# Patient Record
Sex: FEMALE | Race: WHITE | Employment: UNEMPLOYED | URBAN - METROPOLITAN AREA
[De-identification: names, ages, dates, MRNs, and addresses within clinical notes are randomized per-mention and may not be internally consistent; named-entity substitution may affect disease eponyms.]

---

## 2019-09-18 ENCOUNTER — HOSPITAL ENCOUNTER (EMERGENCY)
Age: 44
Discharge: HOME | End: 2019-09-18
Attending: EMERGENCY MEDICINE
Payer: COMMERCIAL

## 2019-09-18 VITALS
WEIGHT: 293 LBS | OXYGEN SATURATION: 100 % | RESPIRATION RATE: 20 BRPM | HEART RATE: 78 BPM | BODY MASS INDEX: 48.82 KG/M2 | HEIGHT: 65 IN | TEMPERATURE: 98.4 F | DIASTOLIC BLOOD PRESSURE: 69 MMHG | SYSTOLIC BLOOD PRESSURE: 157 MMHG

## 2019-09-18 DIAGNOSIS — L60.0 INGROWN TOENAIL OF RIGHT FOOT WITH INFECTION: Primary | ICD-10-CM

## 2019-09-18 PROCEDURE — 99283 EMERGENCY DEPT VISIT LOW MDM: CPT

## 2019-09-18 RX ORDER — CLINDAMYCIN HYDROCHLORIDE 300 MG/1
300 CAPSULE ORAL 3 TIMES DAILY
Qty: 30 CAPSULE | Refills: 0 | Status: SHIPPED | OUTPATIENT
Start: 2019-09-18 | End: 2019-09-28

## 2019-09-18 ASSESSMENT — ENCOUNTER SYMPTOMS
TROUBLE SWALLOWING: 0
VOMITING: 0
BACK PAIN: 0
BLOOD IN STOOL: 0
SHORTNESS OF BREATH: 0
VOICE CHANGE: 0
EYE DISCHARGE: 0
EYE REDNESS: 0
WHEEZING: 0
DIARRHEA: 0
FACIAL SWELLING: 0
COUGH: 0
SORE THROAT: 0
STRIDOR: 0
EYE PAIN: 0
ABDOMINAL PAIN: 0
SINUS PRESSURE: 0
CONSTIPATION: 0
CHEST TIGHTNESS: 0
CHOKING: 0

## 2019-09-18 ASSESSMENT — PAIN DESCRIPTION - DESCRIPTORS: DESCRIPTORS: DULL;THROBBING;STABBING

## 2019-09-18 ASSESSMENT — PAIN DESCRIPTION - PROGRESSION: CLINICAL_PROGRESSION: NOT CHANGED

## 2019-09-18 ASSESSMENT — PAIN DESCRIPTION - ONSET: ONSET: ON-GOING

## 2019-09-18 ASSESSMENT — PAIN SCALES - GENERAL: PAINLEVEL_OUTOF10: 9

## 2019-09-18 ASSESSMENT — PAIN DESCRIPTION - ORIENTATION: ORIENTATION: RIGHT

## 2019-09-18 ASSESSMENT — PAIN DESCRIPTION - PAIN TYPE: TYPE: ACUTE PAIN

## 2019-09-18 ASSESSMENT — PAIN DESCRIPTION - LOCATION: LOCATION: TOE (COMMENT WHICH ONE)

## 2019-09-18 ASSESSMENT — PAIN DESCRIPTION - FREQUENCY: FREQUENCY: CONTINUOUS

## 2019-09-18 NOTE — ED TRIAGE NOTES
Patient complains of right great toe pain x2 weeks, she had an I&D done by PCP. She has also been seen at urgent care and finished her antibiotics.  She has not yet been to a podiatrist.

## 2019-09-18 NOTE — ED PROVIDER NOTES
/ [facility] ED  eMERGENCY dEPARTMENT eNCOUnter      Pt Name: Gen Tong  MRN: [id number]  [place] 1975  Date of evaluation: 9/18/2019  Provider: Julia Rubalcava MD    [address] [place] [address]       Chief Complaint   Patient presents with    Toe Pain     x 2 week (Rt big toe infection)         HISTORY OF PRESENT ILLNESS   (Location/Symptom, Timing/Onset,Context/Setting, Quality, Duration, Modifying Factors, Severity)  Note limiting factors. Gen Tong is a 40 y.o. female who presents to the emergency department patient comes to the emergency with right big toe infection building up for the last 2 weeks time patient does not recall any injury or fall no prior history of ingrown toenailof, no history of diabetes mellitus patient has moved to this town from [facility] [place] where she was treated ×2 with the 2 different antibiotics which did not help patient taking ibuprofen for her swelling and pain    HPI    NursingNotes were reviewed. REVIEW OF SYSTEMS    (2-9 systems for level 4, 10 or more for level 5)     Review of Systems   Constitutional: Negative. Negative for activity change and fever. HENT: Negative for congestion, drooling, facial swelling, mouth sores, nosebleeds, sinus pressure, sore throat, trouble swallowing and voice change. Eyes: Negative for pain, discharge, redness and visual disturbance. Respiratory: Negative for cough, choking, chest tightness, shortness of breath, wheezing and stridor. Cardiovascular: Negative for chest pain, palpitations and leg swelling. Gastrointestinal: Negative for abdominal pain, blood in stool, constipation, diarrhea and vomiting. Endocrine: Negative for cold intolerance, polyphagia and polyuria. Genitourinary: Negative for dysuria, flank pain, frequency, genital sores and urgency. Musculoskeletal: Positive for arthralgias, joint swelling and myalgias. Negative for back pain, neck pain and neck stiffness. Skin: Negative for pallor and rash. abused: None     Forced sexual activity: None   Other Topics Concern    None   Social History Narrative    None       SCREENINGS      @FLOW([id number])@      PHYSICAL EXAM    (up to 7 for level 4, 8 or more for level 5)     ED Triage Vitals [09/18/19 1046]   BP Temp Temp Source Pulse Resp SpO2 Height Weight   (!) 157/69 98.4 °F (36.9 °C) Oral 78 20 100 % 5' 5\" (1.651 m) (!) 384 lb (174.2 kg)       Physical Exam   Constitutional: She is oriented to person, place, and time. She appears well-developed and well-nourished. Active alert cooperative patient in nontoxic appearance ambulatory   HENT:   Head: Normocephalic and atraumatic. Right Ear: External ear normal.   Left Ear: External ear normal.   Nose: Nose normal.   Mouth/Throat: Oropharynx is clear and moist.   Eyes: Pupils are equal, round, and reactive to light. EOM are normal.   Neck: Normal range of motion. Neck supple. Cardiovascular: Normal rate, regular rhythm, normal heart sounds and intact distal pulses. Exam reveals no gallop. No murmur heard. Pulmonary/Chest: Breath sounds normal. No respiratory distress. She has no wheezes. Abdominal: Soft. Bowel sounds are normal. She exhibits no mass. There is no rebound. Musculoskeletal: Normal range of motion. She exhibits edema and tenderness. She exhibits no deformity. Attention  to the right foot and compared with the left foot patient has no ingrown toenail to the left big toe but on the right side patient has a positive for ingrown toenails positive for surrounding erythema and swelling of fluctuation to suspect any abscess to drain at this time patient is findings consistent with infected ingrown toenail   Neurological: She is alert and oriented to person, place, and time. No cranial nerve deficit. She exhibits normal muscle tone. Skin: Skin is warm. No rash noted. No erythema. Psychiatric: Her behavior is normal. Thought content normal.   Nursing note and vitals reviewed.       DIAGNOSTIC

## 2019-10-08 ENCOUNTER — HOSPITAL ENCOUNTER (EMERGENCY)
Age: 44
Discharge: HOME | End: 2019-10-08
Attending: EMERGENCY MEDICINE
Payer: COMMERCIAL

## 2019-10-08 ENCOUNTER — APPOINTMENT (OUTPATIENT)
Dept: GENERAL RADIOLOGY | Age: 44
End: 2019-10-08
Payer: COMMERCIAL

## 2019-10-08 VITALS
SYSTOLIC BLOOD PRESSURE: 151 MMHG | TEMPERATURE: 97.8 F | DIASTOLIC BLOOD PRESSURE: 76 MMHG | OXYGEN SATURATION: 98 % | BODY MASS INDEX: 48.82 KG/M2 | HEIGHT: 65 IN | WEIGHT: 293 LBS | HEART RATE: 76 BPM | RESPIRATION RATE: 20 BRPM

## 2019-10-08 DIAGNOSIS — M54.12 CERVICAL RADICULOPATHY: Primary | ICD-10-CM

## 2019-10-08 DIAGNOSIS — M47.22 OSTEOARTHRITIS OF SPINE WITH RADICULOPATHY, CERVICAL REGION: ICD-10-CM

## 2019-10-08 PROCEDURE — 99283 EMERGENCY DEPT VISIT LOW MDM: CPT

## 2019-10-08 PROCEDURE — 6370000000 HC RX 637 (ALT 250 FOR IP): Performed by: EMERGENCY MEDICINE

## 2019-10-08 PROCEDURE — 72050 X-RAY EXAM NECK SPINE 4/5VWS: CPT

## 2019-10-08 PROCEDURE — 6360000002 HC RX W HCPCS: Performed by: EMERGENCY MEDICINE

## 2019-10-08 PROCEDURE — 96372 THER/PROPH/DIAG INJ SC/IM: CPT

## 2019-10-08 RX ORDER — LEVOTHYROXINE SODIUM 0.07 MG/1
75 TABLET ORAL
COMMUNITY
Start: 2019-08-30 | End: 2019-12-10 | Stop reason: SDUPTHER

## 2019-10-08 RX ORDER — ALBUTEROL SULFATE 2.5 MG/3ML
2.5 SOLUTION RESPIRATORY (INHALATION)
COMMUNITY
Start: 2019-08-02 | End: 2020-03-09 | Stop reason: SDUPTHER

## 2019-10-08 RX ORDER — PREDNISONE 10 MG/1
60 TABLET ORAL DAILY
Qty: 30 TABLET | Refills: 0 | Status: SHIPPED | OUTPATIENT
Start: 2019-10-08 | End: 2019-10-13

## 2019-10-08 RX ORDER — LORAZEPAM 0.5 MG/1
0.5 TABLET ORAL
COMMUNITY
Start: 2019-08-30 | End: 2019-10-29

## 2019-10-08 RX ORDER — BUTALBITAL, ACETAMINOPHEN AND CAFFEINE 50; 325; 40 MG/1; MG/1; MG/1
2 TABLET ORAL EVERY 4 HOURS PRN
COMMUNITY
Start: 2019-08-30 | End: 2020-06-22

## 2019-10-08 RX ORDER — ONDANSETRON 4 MG/1
4 TABLET, ORALLY DISINTEGRATING ORAL ONCE
Status: COMPLETED | OUTPATIENT
Start: 2019-10-08 | End: 2019-10-08

## 2019-10-08 RX ORDER — MONTELUKAST SODIUM 10 MG/1
10 TABLET ORAL
COMMUNITY
Start: 2019-08-30 | End: 2020-06-01 | Stop reason: SDUPTHER

## 2019-10-08 RX ORDER — KETOROLAC TROMETHAMINE 10 MG/1
10 TABLET, FILM COATED ORAL EVERY 6 HOURS PRN
Qty: 20 TABLET | Refills: 0 | Status: SHIPPED | OUTPATIENT
Start: 2019-10-08 | End: 2019-12-10 | Stop reason: ALTCHOICE

## 2019-10-08 RX ORDER — OXYCODONE HYDROCHLORIDE AND ACETAMINOPHEN 5; 325 MG/1; MG/1
1 TABLET ORAL EVERY 6 HOURS PRN
Qty: 10 TABLET | Refills: 0 | Status: SHIPPED | OUTPATIENT
Start: 2019-10-08 | End: 2019-10-11

## 2019-10-08 RX ORDER — IBUPROFEN 800 MG/1
800 TABLET ORAL
COMMUNITY
Start: 2019-08-30 | End: 2020-03-09 | Stop reason: ALTCHOICE

## 2019-10-08 RX ORDER — CYCLOBENZAPRINE HCL 10 MG
10 TABLET ORAL 3 TIMES DAILY PRN
Qty: 30 TABLET | Refills: 0 | Status: SHIPPED | OUTPATIENT
Start: 2019-10-08 | End: 2019-10-18

## 2019-10-08 RX ORDER — CETIRIZINE HYDROCHLORIDE 10 MG/1
10 TABLET ORAL
COMMUNITY
Start: 2019-08-30 | End: 2020-06-01 | Stop reason: SDUPTHER

## 2019-10-08 RX ADMIN — HYDROMORPHONE HYDROCHLORIDE 1 MG: 1 INJECTION, SOLUTION INTRAMUSCULAR; INTRAVENOUS; SUBCUTANEOUS at 13:50

## 2019-10-08 RX ADMIN — ONDANSETRON 4 MG: 4 TABLET, ORALLY DISINTEGRATING ORAL at 13:50

## 2019-10-08 ASSESSMENT — ENCOUNTER SYMPTOMS
APNEA: 0
SHORTNESS OF BREATH: 0
EYE PAIN: 0
BACK PAIN: 0
CONSTIPATION: 0
WHEEZING: 0
SINUS PRESSURE: 0
PHOTOPHOBIA: 0
VOMITING: 0
ABDOMINAL DISTENTION: 0
NAUSEA: 0
SORE THROAT: 0
COUGH: 0
ABDOMINAL PAIN: 0
DIARRHEA: 0
COLOR CHANGE: 0
RHINORRHEA: 0

## 2019-10-08 ASSESSMENT — PAIN DESCRIPTION - PAIN TYPE: TYPE: ACUTE PAIN

## 2019-10-08 ASSESSMENT — PAIN DESCRIPTION - LOCATION: LOCATION: NECK;HEAD

## 2019-10-08 ASSESSMENT — PAIN SCALES - GENERAL: PAINLEVEL_OUTOF10: 10

## 2019-10-08 ASSESSMENT — PAIN DESCRIPTION - DESCRIPTORS: DESCRIPTORS: THROBBING;STABBING;BURNING

## 2019-10-08 ASSESSMENT — PAIN DESCRIPTION - FREQUENCY: FREQUENCY: CONTINUOUS

## 2019-10-10 ENCOUNTER — OFFICE VISIT (OUTPATIENT)
Dept: FAMILY MEDICINE CLINIC | Age: 44
End: 2019-10-10
Payer: COMMERCIAL

## 2019-10-10 VITALS
TEMPERATURE: 97 F | WEIGHT: 293 LBS | SYSTOLIC BLOOD PRESSURE: 138 MMHG | HEIGHT: 65 IN | DIASTOLIC BLOOD PRESSURE: 82 MMHG | HEART RATE: 88 BPM | RESPIRATION RATE: 16 BRPM | BODY MASS INDEX: 48.82 KG/M2 | OXYGEN SATURATION: 98 %

## 2019-10-10 DIAGNOSIS — F32.A ANXIETY AND DEPRESSION: ICD-10-CM

## 2019-10-10 DIAGNOSIS — M54.41 CHRONIC BILATERAL LOW BACK PAIN WITH BILATERAL SCIATICA: ICD-10-CM

## 2019-10-10 DIAGNOSIS — E66.01 MORBID OBESITY DUE TO EXCESS CALORIES (HCC): ICD-10-CM

## 2019-10-10 DIAGNOSIS — M51.36 DDD (DEGENERATIVE DISC DISEASE), LUMBAR: ICD-10-CM

## 2019-10-10 DIAGNOSIS — J45.20 MILD INTERMITTENT ASTHMA WITHOUT COMPLICATION: ICD-10-CM

## 2019-10-10 DIAGNOSIS — M15.9 PRIMARY OSTEOARTHRITIS INVOLVING MULTIPLE JOINTS: ICD-10-CM

## 2019-10-10 DIAGNOSIS — Z12.4 SCREENING FOR CERVICAL CANCER: ICD-10-CM

## 2019-10-10 DIAGNOSIS — Z13.1 SCREENING FOR DIABETES MELLITUS: ICD-10-CM

## 2019-10-10 DIAGNOSIS — E03.9 HYPOTHYROIDISM, UNSPECIFIED TYPE: ICD-10-CM

## 2019-10-10 DIAGNOSIS — G89.29 CHRONIC BILATERAL LOW BACK PAIN WITH BILATERAL SCIATICA: ICD-10-CM

## 2019-10-10 DIAGNOSIS — M50.30 DDD (DEGENERATIVE DISC DISEASE), CERVICAL: Primary | ICD-10-CM

## 2019-10-10 DIAGNOSIS — M54.42 CHRONIC BILATERAL LOW BACK PAIN WITH BILATERAL SCIATICA: ICD-10-CM

## 2019-10-10 DIAGNOSIS — J30.89 PERENNIAL ALLERGIC RHINITIS: ICD-10-CM

## 2019-10-10 DIAGNOSIS — F19.11 HISTORY OF SUBSTANCE ABUSE (HCC): Chronic | ICD-10-CM

## 2019-10-10 DIAGNOSIS — F41.9 ANXIETY AND DEPRESSION: ICD-10-CM

## 2019-10-10 DIAGNOSIS — D50.9 IRON DEFICIENCY ANEMIA, UNSPECIFIED IRON DEFICIENCY ANEMIA TYPE: ICD-10-CM

## 2019-10-10 DIAGNOSIS — Z80.3 FAMILY HISTORY OF BREAST CANCER: ICD-10-CM

## 2019-10-10 DIAGNOSIS — Z12.39 SCREENING FOR BREAST CANCER: ICD-10-CM

## 2019-10-10 DIAGNOSIS — G43.009 MIGRAINE WITHOUT AURA AND WITHOUT STATUS MIGRAINOSUS, NOT INTRACTABLE: ICD-10-CM

## 2019-10-10 PROBLEM — M15.0 PRIMARY OSTEOARTHRITIS INVOLVING MULTIPLE JOINTS: Status: ACTIVE | Noted: 2019-10-10

## 2019-10-10 PROCEDURE — G8417 CALC BMI ABV UP PARAM F/U: HCPCS | Performed by: FAMILY MEDICINE

## 2019-10-10 PROCEDURE — 99205 OFFICE O/P NEW HI 60 MIN: CPT | Performed by: FAMILY MEDICINE

## 2019-10-10 PROCEDURE — 96160 PT-FOCUSED HLTH RISK ASSMT: CPT | Performed by: FAMILY MEDICINE

## 2019-10-10 PROCEDURE — G8484 FLU IMMUNIZE NO ADMIN: HCPCS | Performed by: FAMILY MEDICINE

## 2019-10-10 PROCEDURE — 1036F TOBACCO NON-USER: CPT | Performed by: FAMILY MEDICINE

## 2019-10-10 PROCEDURE — G8427 DOCREV CUR MEDS BY ELIG CLIN: HCPCS | Performed by: FAMILY MEDICINE

## 2019-10-10 RX ORDER — OXCARBAZEPINE 300 MG/1
TABLET, FILM COATED ORAL
COMMUNITY
Start: 2019-08-30 | End: 2020-04-20 | Stop reason: DRUGHIGH

## 2019-10-10 RX ORDER — ZOLPIDEM TARTRATE 10 MG/1
TABLET ORAL
COMMUNITY
Start: 2019-09-01 | End: 2020-01-21 | Stop reason: ALTCHOICE

## 2019-10-10 RX ORDER — HYDROXYZINE PAMOATE 50 MG/1
150 CAPSULE ORAL
COMMUNITY
Start: 2019-09-04 | End: 2019-10-10 | Stop reason: SDUPTHER

## 2019-10-10 RX ORDER — HYDROXYZINE PAMOATE 50 MG/1
50 CAPSULE ORAL 3 TIMES DAILY PRN
Qty: 90 CAPSULE | Refills: 0 | Status: SHIPPED | OUTPATIENT
Start: 2019-10-10 | End: 2019-11-09

## 2019-10-10 RX ORDER — ALBUTEROL SULFATE 90 UG/1
2 AEROSOL, METERED RESPIRATORY (INHALATION) EVERY 6 HOURS PRN
COMMUNITY
End: 2020-01-21 | Stop reason: SDUPTHER

## 2019-10-10 RX ORDER — MELOXICAM 15 MG/1
15 TABLET ORAL
COMMUNITY
Start: 2019-08-30 | End: 2020-03-09 | Stop reason: ALTCHOICE

## 2019-10-10 RX ORDER — ERGOCALCIFEROL 1.25 MG/1
50000 CAPSULE ORAL
COMMUNITY
Start: 2019-08-05 | End: 2019-12-10 | Stop reason: SDUPTHER

## 2019-10-10 RX ORDER — SERTRALINE HYDROCHLORIDE 100 MG/1
100 TABLET, FILM COATED ORAL
COMMUNITY
Start: 2019-08-30 | End: 2020-06-22 | Stop reason: DRUGHIGH

## 2019-10-10 SDOH — HEALTH STABILITY: MENTAL HEALTH: HOW MANY STANDARD DRINKS CONTAINING ALCOHOL DO YOU HAVE ON A TYPICAL DAY?: 1 OR 2

## 2019-10-10 SDOH — HEALTH STABILITY: MENTAL HEALTH: HOW OFTEN DO YOU HAVE A DRINK CONTAINING ALCOHOL?: MONTHLY OR LESS

## 2019-10-10 ASSESSMENT — ENCOUNTER SYMPTOMS
COUGH: 0
BLOOD IN STOOL: 0
WHEEZING: 0
VOMITING: 0
CHEST TIGHTNESS: 0
DIARRHEA: 0
SHORTNESS OF BREATH: 0
BACK PAIN: 1
ABDOMINAL PAIN: 0
NAUSEA: 0
CONSTIPATION: 0
ANAL BLEEDING: 0

## 2019-10-10 ASSESSMENT — COLUMBIA-SUICIDE SEVERITY RATING SCALE - C-SSRS
5. HAVE YOU STARTED TO WORK OUT OR WORKED OUT THE DETAILS OF HOW TO KILL YOURSELF? DO YOU INTEND TO CARRY OUT THIS PLAN?: NO
7. DID THIS OCCUR IN THE LAST THREE MONTHS: OVER A YEAR AGO?
1. WITHIN THE PAST MONTH, HAVE YOU WISHED YOU WERE DEAD OR WISHED YOU COULD GO TO SLEEP AND NOT WAKE UP?: NO
4. HAVE YOU HAD THESE THOUGHTS AND HAD SOME INTENTION OF ACTING ON THEM?: NO
6. HAVE YOU EVER DONE ANYTHING, STARTED TO DO ANYTHING, OR PREPARED TO DO ANYTHING TO END YOUR LIFE?: YES
3. HAVE YOU BEEN THINKING ABOUT HOW YOU MIGHT KILL YOURSELF?: NO
2. HAVE YOU ACTUALLY HAD ANY THOUGHTS OF KILLING YOURSELF?: YES

## 2019-10-10 ASSESSMENT — PATIENT HEALTH QUESTIONNAIRE - PHQ9
SUM OF ALL RESPONSES TO PHQ QUESTIONS 1-9: 24
6. FEELING BAD ABOUT YOURSELF - OR THAT YOU ARE A FAILURE OR HAVE LET YOURSELF OR YOUR FAMILY DOWN: 3
2. FEELING DOWN, DEPRESSED OR HOPELESS: 3
SUM OF ALL RESPONSES TO PHQ9 QUESTIONS 1 & 2: 6
9. THOUGHTS THAT YOU WOULD BE BETTER OFF DEAD, OR OF HURTING YOURSELF: 0
10. IF YOU CHECKED OFF ANY PROBLEMS, HOW DIFFICULT HAVE THESE PROBLEMS MADE IT FOR YOU TO DO YOUR WORK, TAKE CARE OF THINGS AT HOME, OR GET ALONG WITH OTHER PEOPLE: 3
4. FEELING TIRED OR HAVING LITTLE ENERGY: 3
1. LITTLE INTEREST OR PLEASURE IN DOING THINGS: 3
SUM OF ALL RESPONSES TO PHQ QUESTIONS 1-9: 24
3. TROUBLE FALLING OR STAYING ASLEEP: 3
8. MOVING OR SPEAKING SO SLOWLY THAT OTHER PEOPLE COULD HAVE NOTICED. OR THE OPPOSITE, BEING SO FIGETY OR RESTLESS THAT YOU HAVE BEEN MOVING AROUND A LOT MORE THAN USUAL: 3
5. POOR APPETITE OR OVEREATING: 3
7. TROUBLE CONCENTRATING ON THINGS, SUCH AS READING THE NEWSPAPER OR WATCHING TELEVISION: 3

## 2019-10-15 ENCOUNTER — TELEPHONE (OUTPATIENT)
Dept: FAMILY MEDICINE CLINIC | Age: 44
End: 2019-10-15

## 2019-10-15 DIAGNOSIS — M54.42 CHRONIC BILATERAL LOW BACK PAIN WITH BILATERAL SCIATICA: Primary | ICD-10-CM

## 2019-10-15 DIAGNOSIS — M50.30 DDD (DEGENERATIVE DISC DISEASE), CERVICAL: ICD-10-CM

## 2019-10-15 DIAGNOSIS — M54.41 CHRONIC BILATERAL LOW BACK PAIN WITH BILATERAL SCIATICA: Primary | ICD-10-CM

## 2019-10-15 DIAGNOSIS — G89.29 CHRONIC BILATERAL LOW BACK PAIN WITH BILATERAL SCIATICA: Primary | ICD-10-CM

## 2019-10-15 DIAGNOSIS — M51.36 DDD (DEGENERATIVE DISC DISEASE), LUMBAR: ICD-10-CM

## 2019-11-05 ENCOUNTER — E-VISIT (OUTPATIENT)
Dept: FAMILY MEDICINE CLINIC | Age: 44
End: 2019-11-05
Payer: COMMERCIAL

## 2019-11-05 PROCEDURE — 98969 PR NONPHYSICIAN ONLINE ASSESSMENT AND MANAGEMENT: CPT | Performed by: NURSE PRACTITIONER

## 2019-11-05 ASSESSMENT — LIFESTYLE VARIABLES
SMOKING_STATUS: NO, I'M A FORMER SMOKER
PACKS_PER_DAY: 3
SMOKING_YEARS: 26

## 2019-12-10 ENCOUNTER — OFFICE VISIT (OUTPATIENT)
Dept: FAMILY MEDICINE CLINIC | Age: 44
End: 2019-12-10
Payer: COMMERCIAL

## 2019-12-10 VITALS
OXYGEN SATURATION: 97 % | BODY MASS INDEX: 48.82 KG/M2 | SYSTOLIC BLOOD PRESSURE: 130 MMHG | TEMPERATURE: 97.2 F | WEIGHT: 293 LBS | HEART RATE: 114 BPM | HEIGHT: 65 IN | DIASTOLIC BLOOD PRESSURE: 80 MMHG

## 2019-12-10 DIAGNOSIS — M15.9 PRIMARY OSTEOARTHRITIS INVOLVING MULTIPLE JOINTS: ICD-10-CM

## 2019-12-10 DIAGNOSIS — E03.9 HYPOTHYROIDISM, UNSPECIFIED TYPE: ICD-10-CM

## 2019-12-10 DIAGNOSIS — M54.42 CHRONIC BILATERAL LOW BACK PAIN WITH BILATERAL SCIATICA: ICD-10-CM

## 2019-12-10 DIAGNOSIS — G89.29 CHRONIC BILATERAL LOW BACK PAIN WITH BILATERAL SCIATICA: ICD-10-CM

## 2019-12-10 DIAGNOSIS — B37.2 CANDIDAL INTERTRIGO: ICD-10-CM

## 2019-12-10 DIAGNOSIS — F41.9 ANXIETY AND DEPRESSION: ICD-10-CM

## 2019-12-10 DIAGNOSIS — J45.40 MODERATE PERSISTENT ASTHMA WITHOUT COMPLICATION: Primary | ICD-10-CM

## 2019-12-10 DIAGNOSIS — Z13.220 SCREENING CHOLESTEROL LEVEL: ICD-10-CM

## 2019-12-10 DIAGNOSIS — G89.29 CHRONIC NECK PAIN: ICD-10-CM

## 2019-12-10 DIAGNOSIS — D50.9 IRON DEFICIENCY ANEMIA, UNSPECIFIED IRON DEFICIENCY ANEMIA TYPE: ICD-10-CM

## 2019-12-10 DIAGNOSIS — M54.41 CHRONIC BILATERAL LOW BACK PAIN WITH BILATERAL SCIATICA: ICD-10-CM

## 2019-12-10 DIAGNOSIS — M54.2 CHRONIC NECK PAIN: ICD-10-CM

## 2019-12-10 DIAGNOSIS — Z23 NEED FOR VACCINATION: ICD-10-CM

## 2019-12-10 DIAGNOSIS — E55.9 VITAMIN D DEFICIENCY: ICD-10-CM

## 2019-12-10 DIAGNOSIS — F32.A ANXIETY AND DEPRESSION: ICD-10-CM

## 2019-12-10 PROCEDURE — G8482 FLU IMMUNIZE ORDER/ADMIN: HCPCS | Performed by: FAMILY MEDICINE

## 2019-12-10 PROCEDURE — G8427 DOCREV CUR MEDS BY ELIG CLIN: HCPCS | Performed by: FAMILY MEDICINE

## 2019-12-10 PROCEDURE — 99214 OFFICE O/P EST MOD 30 MIN: CPT | Performed by: FAMILY MEDICINE

## 2019-12-10 PROCEDURE — G8417 CALC BMI ABV UP PARAM F/U: HCPCS | Performed by: FAMILY MEDICINE

## 2019-12-10 PROCEDURE — 90471 IMMUNIZATION ADMIN: CPT | Performed by: FAMILY MEDICINE

## 2019-12-10 PROCEDURE — 90686 IIV4 VACC NO PRSV 0.5 ML IM: CPT | Performed by: FAMILY MEDICINE

## 2019-12-10 PROCEDURE — 1036F TOBACCO NON-USER: CPT | Performed by: FAMILY MEDICINE

## 2019-12-10 RX ORDER — NYSTATIN 100000 U/G
CREAM TOPICAL
Qty: 30 G | Refills: 3 | Status: SHIPPED | OUTPATIENT
Start: 2019-12-10 | End: 2020-04-27 | Stop reason: ALTCHOICE

## 2019-12-10 RX ORDER — KETOROLAC TROMETHAMINE 10 MG/1
10 TABLET, FILM COATED ORAL EVERY 6 HOURS PRN
Qty: 20 TABLET | Refills: 0 | Status: CANCELLED | OUTPATIENT
Start: 2019-12-10

## 2019-12-10 RX ORDER — OXCARBAZEPINE 300 MG/1
TABLET, FILM COATED ORAL
Qty: 60 TABLET | Refills: 5 | Status: CANCELLED | OUTPATIENT
Start: 2019-12-10

## 2019-12-10 RX ORDER — FLUTICASONE PROPIONATE 110 UG/1
2 AEROSOL, METERED RESPIRATORY (INHALATION) 2 TIMES DAILY
Qty: 1 INHALER | Refills: 3 | Status: SHIPPED | OUTPATIENT
Start: 2019-12-10 | End: 2020-03-30

## 2019-12-10 RX ORDER — ERGOCALCIFEROL 1.25 MG/1
50000 CAPSULE ORAL WEEKLY
Qty: 4 CAPSULE | Refills: 0 | Status: SHIPPED | OUTPATIENT
Start: 2019-12-10 | End: 2019-12-17 | Stop reason: SDUPTHER

## 2019-12-10 RX ORDER — CYCLOBENZAPRINE HCL 10 MG
10 TABLET ORAL 3 TIMES DAILY PRN
COMMUNITY
End: 2020-04-27 | Stop reason: ALTCHOICE

## 2019-12-10 RX ORDER — BUTALBITAL, ACETAMINOPHEN AND CAFFEINE 50; 325; 40 MG/1; MG/1; MG/1
2 TABLET ORAL EVERY 4 HOURS PRN
Qty: 180 TABLET | Refills: 2 | Status: CANCELLED | OUTPATIENT
Start: 2019-12-10 | End: 2020-03-21

## 2019-12-10 RX ORDER — LORAZEPAM 0.5 MG/1
TABLET ORAL
COMMUNITY
Start: 2019-09-01

## 2019-12-10 RX ORDER — LEVOTHYROXINE SODIUM 0.07 MG/1
75 TABLET ORAL DAILY
Qty: 30 TABLET | Refills: 5 | Status: SHIPPED | OUTPATIENT
Start: 2019-12-10 | End: 2020-05-25

## 2019-12-10 RX ORDER — IBUPROFEN 800 MG/1
800 TABLET ORAL EVERY 6 HOURS PRN
Qty: 120 TABLET | Refills: 5 | Status: CANCELLED | OUTPATIENT
Start: 2019-12-10 | End: 2020-03-21

## 2019-12-10 RX ORDER — NYSTATIN 100000 [USP'U]/G
POWDER TOPICAL
Qty: 60 G | Refills: 3 | Status: SHIPPED | OUTPATIENT
Start: 2019-12-10 | End: 2020-01-21 | Stop reason: SDUPTHER

## 2019-12-10 RX ORDER — HYDROXYZINE 50 MG/1
150 TABLET, FILM COATED ORAL 4 TIMES DAILY
COMMUNITY
End: 2020-03-09 | Stop reason: SDUPTHER

## 2019-12-10 RX ORDER — CYCLOBENZAPRINE HCL 10 MG
10 TABLET ORAL 3 TIMES DAILY PRN
Qty: 30 TABLET | Refills: 2 | Status: CANCELLED | OUTPATIENT
Start: 2019-12-10

## 2019-12-10 ASSESSMENT — ENCOUNTER SYMPTOMS
NAUSEA: 0
CHEST TIGHTNESS: 1
TROUBLE SWALLOWING: 0
COUGH: 0
VOMITING: 0
SHORTNESS OF BREATH: 1
SORE THROAT: 0
SINUS PRESSURE: 0
DIARRHEA: 0
ANAL BLEEDING: 0
CONSTIPATION: 0
BLOOD IN STOOL: 0
ABDOMINAL PAIN: 0
SINUS PAIN: 0
BACK PAIN: 1
RHINORRHEA: 0
WHEEZING: 1

## 2019-12-11 ENCOUNTER — TELEPHONE (OUTPATIENT)
Dept: ADMINISTRATIVE | Age: 44
End: 2019-12-11

## 2019-12-12 ENCOUNTER — HOSPITAL ENCOUNTER (OUTPATIENT)
Dept: LAB | Age: 44
Discharge: HOME | End: 2019-12-12
Payer: COMMERCIAL

## 2019-12-12 DIAGNOSIS — F32.A ANXIETY AND DEPRESSION: ICD-10-CM

## 2019-12-12 DIAGNOSIS — D50.9 IRON DEFICIENCY ANEMIA, UNSPECIFIED IRON DEFICIENCY ANEMIA TYPE: ICD-10-CM

## 2019-12-12 DIAGNOSIS — E03.9 HYPOTHYROIDISM, UNSPECIFIED TYPE: ICD-10-CM

## 2019-12-12 DIAGNOSIS — F41.9 ANXIETY AND DEPRESSION: ICD-10-CM

## 2019-12-12 DIAGNOSIS — Z13.220 SCREENING CHOLESTEROL LEVEL: ICD-10-CM

## 2019-12-12 LAB
ALBUMIN SERPL-MCNC: 3.9 G/DL (ref 3.5–4.6)
ALP BLD-CCNC: 89 U/L (ref 40–130)
ALT SERPL-CCNC: 18 U/L (ref 0–33)
ANION GAP SERPL CALCULATED.3IONS-SCNC: 11 MEQ/L (ref 9–15)
AST SERPL-CCNC: 18 U/L (ref 0–35)
BASOPHILS ABSOLUTE: 0.1 K/UL (ref 0–0.2)
BASOPHILS RELATIVE PERCENT: 0.8 %
BILIRUB SERPL-MCNC: 0.4 MG/DL (ref 0.2–0.7)
BUN BLDV-MCNC: 9 MG/DL (ref 6–20)
CALCIUM SERPL-MCNC: 9.1 MG/DL (ref 8.5–9.9)
CHLORIDE BLD-SCNC: 100 MEQ/L (ref 95–107)
CHOLESTEROL, TOTAL: 158 MG/DL (ref 0–199)
CO2: 26 MEQ/L (ref 20–31)
CREAT SERPL-MCNC: 0.68 MG/DL (ref 0.5–0.9)
EOSINOPHILS ABSOLUTE: 0.1 K/UL (ref 0–0.7)
EOSINOPHILS RELATIVE PERCENT: 2.1 %
FERRITIN: 52.7 NG/ML (ref 13–150)
GFR AFRICAN AMERICAN: >60
GFR NON-AFRICAN AMERICAN: >60
GLOBULIN: 3.6 G/DL (ref 2.3–3.5)
GLUCOSE BLD-MCNC: 84 MG/DL (ref 70–99)
HBA1C MFR BLD: 5.6 % (ref 4.8–5.9)
HCT VFR BLD CALC: 36.4 % (ref 37–47)
HDLC SERPL-MCNC: 45 MG/DL (ref 40–59)
HEMOGLOBIN: 11.9 G/DL (ref 12–16)
IRON SATURATION: 11 % (ref 11–46)
IRON: 35 UG/DL (ref 37–145)
LDL CHOLESTEROL CALCULATED: 87 MG/DL (ref 0–129)
LYMPHOCYTES ABSOLUTE: 1.3 K/UL (ref 1–4.8)
LYMPHOCYTES RELATIVE PERCENT: 18.3 %
MCH RBC QN AUTO: 26.5 PG (ref 27–31.3)
MCHC RBC AUTO-ENTMCNC: 32.8 % (ref 33–37)
MCV RBC AUTO: 80.6 FL (ref 82–100)
MONOCYTES ABSOLUTE: 0.4 K/UL (ref 0.2–0.8)
MONOCYTES RELATIVE PERCENT: 6.3 %
NEUTROPHILS ABSOLUTE: 5 K/UL (ref 1.4–6.5)
NEUTROPHILS RELATIVE PERCENT: 72.5 %
PDW BLD-RTO: 15.8 % (ref 11.5–14.5)
PLATELET # BLD: 407 K/UL (ref 130–400)
POTASSIUM SERPL-SCNC: 4 MEQ/L (ref 3.4–4.9)
RBC # BLD: 4.52 M/UL (ref 4.2–5.4)
SODIUM BLD-SCNC: 137 MEQ/L (ref 135–144)
T4 FREE: 1.18 NG/DL (ref 0.84–1.68)
TOTAL IRON BINDING CAPACITY: 310 UG/DL (ref 178–450)
TOTAL PROTEIN: 7.5 G/DL (ref 6.3–8)
TRIGL SERPL-MCNC: 129 MG/DL (ref 0–150)
TSH SERPL DL<=0.05 MIU/L-ACNC: 4.04 UIU/ML (ref 0.44–3.86)
VITAMIN D 25-HYDROXY: 13.7 NG/ML (ref 30–100)
WBC # BLD: 6.9 K/UL (ref 4.8–10.8)

## 2019-12-12 PROCEDURE — 83036 HEMOGLOBIN GLYCOSYLATED A1C: CPT

## 2019-12-12 PROCEDURE — 82306 VITAMIN D 25 HYDROXY: CPT

## 2019-12-12 PROCEDURE — 36415 COLL VENOUS BLD VENIPUNCTURE: CPT

## 2019-12-12 PROCEDURE — 84443 ASSAY THYROID STIM HORMONE: CPT

## 2019-12-12 PROCEDURE — 85025 COMPLETE CBC W/AUTO DIFF WBC: CPT

## 2019-12-12 PROCEDURE — 83550 IRON BINDING TEST: CPT

## 2019-12-12 PROCEDURE — 84439 ASSAY OF FREE THYROXINE: CPT

## 2019-12-12 PROCEDURE — 82728 ASSAY OF FERRITIN: CPT

## 2019-12-12 PROCEDURE — 83540 ASSAY OF IRON: CPT

## 2019-12-12 PROCEDURE — 80053 COMPREHEN METABOLIC PANEL: CPT

## 2019-12-12 PROCEDURE — 80061 LIPID PANEL: CPT

## 2019-12-17 DIAGNOSIS — E03.9 HYPOTHYROIDISM, UNSPECIFIED TYPE: ICD-10-CM

## 2019-12-17 DIAGNOSIS — E55.9 VITAMIN D DEFICIENCY: ICD-10-CM

## 2019-12-17 DIAGNOSIS — D50.9 IRON DEFICIENCY ANEMIA, UNSPECIFIED IRON DEFICIENCY ANEMIA TYPE: Primary | ICD-10-CM

## 2019-12-17 RX ORDER — ERGOCALCIFEROL 1.25 MG/1
50000 CAPSULE ORAL WEEKLY
Qty: 12 CAPSULE | Refills: 0 | Status: SHIPPED | OUTPATIENT
Start: 2019-12-17 | End: 2020-03-26

## 2019-12-17 RX ORDER — ACETAMINOPHEN 160 MG
1 TABLET,DISINTEGRATING ORAL DAILY
Qty: 90 CAPSULE | Refills: 3 | Status: SHIPPED | OUTPATIENT
Start: 2019-12-17 | End: 2020-06-22 | Stop reason: SDUPTHER

## 2020-01-07 ENCOUNTER — HOSPITAL ENCOUNTER (OUTPATIENT)
Dept: GENERAL RADIOLOGY | Age: 45
Discharge: HOME | End: 2020-01-09
Payer: COMMERCIAL

## 2020-01-07 ENCOUNTER — HOSPITAL ENCOUNTER (OUTPATIENT)
Age: 45
Discharge: HOME | End: 2020-01-09
Payer: COMMERCIAL

## 2020-01-07 PROCEDURE — 73564 X-RAY EXAM KNEE 4 OR MORE: CPT

## 2020-01-07 PROCEDURE — 72110 X-RAY EXAM L-2 SPINE 4/>VWS: CPT

## 2020-01-13 ENCOUNTER — OFFICE VISIT (OUTPATIENT)
Dept: FAMILY MEDICINE CLINIC | Age: 45
End: 2020-01-13
Payer: COMMERCIAL

## 2020-01-13 VITALS
RESPIRATION RATE: 16 BRPM | DIASTOLIC BLOOD PRESSURE: 82 MMHG | SYSTOLIC BLOOD PRESSURE: 136 MMHG | WEIGHT: 293 LBS | TEMPERATURE: 97.5 F | HEART RATE: 84 BPM | HEIGHT: 65 IN | BODY MASS INDEX: 48.82 KG/M2

## 2020-01-13 PROBLEM — E55.9 VITAMIN D DEFICIENCY: Status: ACTIVE | Noted: 2020-01-13

## 2020-01-13 PROCEDURE — 99214 OFFICE O/P EST MOD 30 MIN: CPT | Performed by: FAMILY MEDICINE

## 2020-01-13 RX ORDER — HYDROXYZINE PAMOATE 50 MG/1
50 CAPSULE ORAL 3 TIMES DAILY PRN
COMMUNITY

## 2020-01-13 RX ORDER — QUETIAPINE FUMARATE 200 MG/1
TABLET, FILM COATED ORAL
COMMUNITY
Start: 2019-12-17 | End: 2020-03-09 | Stop reason: ALTCHOICE

## 2020-01-13 RX ORDER — NABUMETONE 750 MG/1
TABLET, FILM COATED ORAL
COMMUNITY
Start: 2020-01-07 | End: 2020-06-01 | Stop reason: ALTCHOICE

## 2020-01-13 RX ORDER — TIZANIDINE 4 MG/1
TABLET ORAL
COMMUNITY
Start: 2020-01-07

## 2020-01-13 RX ORDER — PREGABALIN 25 MG/1
CAPSULE ORAL
COMMUNITY
Start: 2020-01-07 | End: 2020-03-09 | Stop reason: ALTCHOICE

## 2020-01-13 ASSESSMENT — ENCOUNTER SYMPTOMS
BACK PAIN: 1
ABDOMINAL PAIN: 0
CHEST TIGHTNESS: 0
ANAL BLEEDING: 0
DIARRHEA: 0
COUGH: 0
CONSTIPATION: 0
SHORTNESS OF BREATH: 0
VOMITING: 0
NAUSEA: 0
BLOOD IN STOOL: 0
WHEEZING: 0

## 2020-01-13 NOTE — PROGRESS NOTES
Subjective:      Patient ID: Tyrel Medina is a 40 y.o. female who presents today for:     Chief Complaint   Patient presents with    Hypothyroidism     Presents today for her routine chronic check up       HPI     Patient presents for follow-up visit regarding asthma and chronic pain management. She reports using Flovent as prescribed since her most recent visit with benefit overall in terms of her breathing. She denies more frequent use of albuterol. She denies any dyspnea at rest and denies any worsening cough, persistent wheezing, chest tightness, or change in her activity tolerance. She reports getting established with Dr. Korin Brown for long term pain management. She was started on Lyrica, Relafen, and Zanaflex and had imaging done. She reports starting the medication last week and denies any major side effects, including any issues with SI/HI. She has her F/U visit scheduled in a month to reassess. She reports starting iron supplementation and vitamin D supplementation as prescribed based on her most recent lab results. She denies any worsening fatigue. She remains established with [facility] for management of mood/anxiety and has F/U visit scheduled tomorrow. She denies any problems with worsening mood or worsening anxiety.      Past Medical History:   Diagnosis Date    Anxiety     Asthma     History of substance abuse ([facility] [address] 10/10/2019    Perennial allergic rhinitis 10/10/2019    Primary osteoarthritis involving multiple joints 10/10/2019     Past Surgical History:   Procedure Laterality Date    TUBAL LIGATION  2003     Family History   Problem Relation Age of Onset    High Blood Pressure Mother     High Cholesterol Mother     Diabetes Mother     Kidney Disease Mother     High Blood Pressure Father     High Cholesterol Father     COPD Father     Heart Failure Father     Breast Cancer Sister 40    Breast Cancer Maternal Grandmother 59    No Known Problems Maternal Grandfather     No Known Problems Paternal Grandmother     No Known Problems Paternal Grandfather     Heart Failure Brother     Cancer Maternal Uncle         unknown     Social History     Socioeconomic History    Marital status: Single     Spouse name: Not on file    Number of children: Not on file    Years of education: Not on file    Highest education level: Not on file   Occupational History    Occupation: on disability due to knee and back pain   Social Needs    Financial resource strain: Not on file    Food insecurity:     Worry: Not on file     Inability: Not on file    Transportation needs:     Medical: Not on file     Non-medical: Not on file   Tobacco Use    Smoking status: Former Smoker     Packs/day: 2.00     Years: 27.00     Pack years: 54.00     Types: Cigarettes     Start date:      Last attempt to quit:      Years since quittin.0    Smokeless tobacco: Never Used   Substance and Sexual Activity    Alcohol use: Yes     Frequency: Monthly or less     Drinks per session: 1 or 2     Binge frequency: Never    Drug use: Never     Comment: hx marijuana, cocaine, PCP, methanphetamine use in the past, last time was 11 years ago    Sexual activity: Not Currently     Partners: Male     Comment: no history of STI   Lifestyle    Physical activity:     Days per week: Not on file     Minutes per session: Not on file    Stress: Not on file   Relationships    Social connections:     Talks on phone: Not on file     Gets together: Not on file     Attends [employer] service: Not on file     Active member of club or organization: Not on file     Attends meetings of clubs or organizations: Not on file     Relationship status: Not on file    Intimate partner violence:     Fear of current or ex partner: Not on file     Emotionally abused: Not on file     Physically abused: Not on file     Forced sexual activity: Not on file   Other Topics Concern    Not on file   Social History Narrative    Lives with one butalbital-acetaminophen-caffeine (FIORICET, ESGIC) -40 MG per tablet Take 2 tablets by mouth      meloxicam (MOBIC) 15 MG tablet Take 15 mg by mouth      ibuprofen (ADVIL;MOTRIN) 800 MG tablet Take 800 mg by mouth       No current facility-administered medications on file prior to visit. Allergies:  Hydroxyzine hcl; Iodinated diagnostic agents; Iv contrast [iodides]; Lamictal [lamotrigine]; Pcn [penicillins]; Shellfish-derived products; Methylphenidate; Neosporin [neomycin-polymyxin-gramicidin]; Tramadol; and Voltaren [diclofenac sodium]    Review of Systems   Constitutional: Negative for appetite change, chills, diaphoresis, fatigue, fever and unexpected weight change. Eyes: Negative for visual disturbance. Respiratory: Negative for cough, chest tightness, shortness of breath and wheezing. Cardiovascular: Negative for chest pain, palpitations and leg swelling. No orthopnea, No PND   Gastrointestinal: Negative for abdominal pain, anal bleeding, blood in stool, constipation, diarrhea, nausea and vomiting. No heartburn, No melena   Endocrine: Negative for cold intolerance, heat intolerance, polydipsia, polyphagia and polyuria. Genitourinary: Negative for dysuria and hematuria. Musculoskeletal: Positive for back pain (chronic, intermittent). Negative for myalgias. Skin: Negative for rash. Neurological: Positive for headaches (intermittent, chronic). Negative for dizziness, syncope, weakness, light-headedness and numbness. Psychiatric/Behavioral: Negative for dysphoric mood, self-injury, sleep disturbance and suicidal ideas. The patient is not nervous/anxious. Objective:     /82 (Site: Left Lower Arm, Position: Sitting, Cuff Size: Large Adult)   Pulse 84   Temp 97.5 °F (36.4 °C) (Temporal)   Resp 16   Ht 5' 5\" (1.651 m)   Wt (!) 395 lb 6.4 oz (179.4 kg)   Breastfeeding?  No   BMI 65.80 kg/m²     Physical Exam  Constitutional:       General: She is not in acute distress. Appearance: She is well-developed. She is not diaphoretic. Neck:      Musculoskeletal: Neck supple. Thyroid: No thyromegaly. Vascular: No carotid bruit. Cardiovascular:      Rate and Rhythm: Normal rate and regular rhythm. Heart sounds: Normal heart sounds. No murmur. Pulmonary:      Effort: Pulmonary effort is normal. No respiratory distress. Breath sounds: Normal breath sounds. No wheezing or rales. Abdominal:      General: Bowel sounds are normal. There is no distension. Palpations: Abdomen is soft. Tenderness: There is no tenderness. There is no guarding or rebound. Musculoskeletal: Normal range of motion. Skin:     General: Skin is warm. Findings: No rash. Neurological:      Mental Status: She is alert and oriented to person, place, and time. Psychiatric:         Mood and Affect: Mood and affect normal.         Speech: Speech normal.         Behavior: Behavior normal.         Thought Content: Thought content normal.          Ortho Exam (If Applicable)      Assessment & Plan:      1. Moderate persistent asthma without complication  Improved baseline respiration with use of Flovent twice daily as prescribed since her most recent visit. She reports less needed use of albuterol and improved activity tolerance. She will continue with current treatment and we will follow closely over time    2. Primary osteoarthritis involving multiple joints  Patient newly established with Dr. Candice Conteh for pain management. I stressed to her the importance of following through with instructions per the specialist in terms of medication management. She will keep her scheduled follow-up visit to discuss effectiveness of treatment plan and review imaging results    3.  Iron deficiency anemia, unspecified iron deficiency anemia type  I stressed with patient the importance of taking daily iron supplement to improve blood counts over time and improve energy level overall. We will continue to follow her blood counts over time    4. Hypothyroidism, unspecified type  Patient to continue with her current dose of levothyroxine based on her most recent lab results. She denies any worsening fatigue or temperature intolerance    5. Vitamin D deficiency  Patient to continue with high-dose weekly vitamin D supplement along with daily vitamin D supplement as prescribed. Once she has finished with high dose vitamin D supplement she will return to the lab for repeat testing    6. Anxiety and depression  Patient established with [facility] for long-term management of mood and anxiety. No SI/HI and no-self harming behaviors reported. We will continue to follow peripherally over time. Modified Medications    No medications on file          New Prescriptions    No medications on file        There are no discontinued medications. Return in about 4 months (around 5/13/2020) for Chronic Disease Check.     Emely Murcia MD

## 2020-01-21 ENCOUNTER — APPOINTMENT (OUTPATIENT)
Dept: GENERAL RADIOLOGY | Age: 45
End: 2020-01-21
Payer: COMMERCIAL

## 2020-01-21 ENCOUNTER — HOSPITAL ENCOUNTER (EMERGENCY)
Age: 45
Discharge: HOME | End: 2020-01-21
Attending: EMERGENCY MEDICINE
Payer: COMMERCIAL

## 2020-01-21 ENCOUNTER — OFFICE VISIT (OUTPATIENT)
Dept: FAMILY MEDICINE CLINIC | Age: 45
End: 2020-01-21
Payer: COMMERCIAL

## 2020-01-21 VITALS
BODY MASS INDEX: 48.82 KG/M2 | WEIGHT: 293 LBS | TEMPERATURE: 98 F | HEART RATE: 86 BPM | RESPIRATION RATE: 18 BRPM | SYSTOLIC BLOOD PRESSURE: 123 MMHG | HEIGHT: 65 IN | OXYGEN SATURATION: 98 % | DIASTOLIC BLOOD PRESSURE: 67 MMHG

## 2020-01-21 VITALS
RESPIRATION RATE: 18 BRPM | OXYGEN SATURATION: 97 % | DIASTOLIC BLOOD PRESSURE: 64 MMHG | HEIGHT: 65 IN | SYSTOLIC BLOOD PRESSURE: 108 MMHG | TEMPERATURE: 97.6 F | WEIGHT: 293 LBS | BODY MASS INDEX: 48.82 KG/M2 | HEART RATE: 102 BPM

## 2020-01-21 LAB
ALBUMIN SERPL-MCNC: 4.1 G/DL (ref 3.5–4.6)
ALP BLD-CCNC: 108 U/L (ref 40–130)
ALT SERPL-CCNC: 17 U/L (ref 0–33)
AMORPHOUS: NORMAL
ANION GAP SERPL CALCULATED.3IONS-SCNC: 13 MEQ/L (ref 9–15)
AST SERPL-CCNC: 16 U/L (ref 0–35)
BACTERIA: NORMAL /HPF
BASOPHILS ABSOLUTE: 0 K/UL (ref 0–0.2)
BASOPHILS RELATIVE PERCENT: 0.3 %
BILIRUB SERPL-MCNC: 0.3 MG/DL (ref 0.2–0.7)
BILIRUBIN URINE: NEGATIVE
BLOOD, URINE: NEGATIVE
BUN BLDV-MCNC: 9 MG/DL (ref 6–20)
CALCIUM SERPL-MCNC: 9.7 MG/DL (ref 8.5–9.9)
CHLORIDE BLD-SCNC: 103 MEQ/L (ref 95–107)
CLARITY: NORMAL
CO2: 25 MEQ/L (ref 20–31)
COLOR: YELLOW
CREAT SERPL-MCNC: 0.78 MG/DL (ref 0.5–0.9)
EKG ATRIAL RATE: 80 BPM
EKG P AXIS: 54 DEGREES
EKG P-R INTERVAL: 124 MS
EKG Q-T INTERVAL: 388 MS
EKG QRS DURATION: 96 MS
EKG QTC CALCULATION (BAZETT): 447 MS
EKG R AXIS: 40 DEGREES
EKG T AXIS: 20 DEGREES
EKG VENTRICULAR RATE: 80 BPM
EOSINOPHILS ABSOLUTE: 0.2 K/UL (ref 0–0.7)
EOSINOPHILS RELATIVE PERCENT: 2.2 %
EPITHELIAL CELLS, UA: NORMAL /HPF
GFR AFRICAN AMERICAN: >60
GFR NON-AFRICAN AMERICAN: >60
GLOBULIN: 3.7 G/DL (ref 2.3–3.5)
GLUCOSE BLD-MCNC: 106 MG/DL (ref 70–99)
GLUCOSE URINE: NEGATIVE MG/DL
HCT VFR BLD CALC: 37.7 % (ref 37–47)
HEMOGLOBIN: 12.2 G/DL (ref 12–16)
HYPOCHROMIA: PRESENT
INFLUENZA A ANTIBODY: NEGATIVE
INFLUENZA A BY PCR: NEGATIVE
INFLUENZA B ANTIBODY: NEGATIVE
INFLUENZA B BY PCR: NEGATIVE
KETONES, URINE: NEGATIVE MG/DL
LEUKOCYTE ESTERASE, URINE: NORMAL
LYMPHOCYTES ABSOLUTE: 1.4 K/UL (ref 1–4.8)
LYMPHOCYTES RELATIVE PERCENT: 13.1 %
MAGNESIUM: 2.2 MG/DL (ref 1.7–2.4)
MCH RBC QN AUTO: 26.2 PG (ref 27–31.3)
MCHC RBC AUTO-ENTMCNC: 32.3 % (ref 33–37)
MCV RBC AUTO: 81.1 FL (ref 82–100)
MONOCYTES ABSOLUTE: 0.7 K/UL (ref 0.2–0.8)
MONOCYTES RELATIVE PERCENT: 6.5 %
MUCUS: PRESENT
NEUTROPHILS ABSOLUTE: 8 K/UL (ref 1.4–6.5)
NEUTROPHILS RELATIVE PERCENT: 77.9 %
NITRITE, URINE: NEGATIVE
PDW BLD-RTO: 14.4 % (ref 11.5–14.5)
PH UA: 6 (ref 5–9)
PLATELET # BLD: 483 K/UL (ref 130–400)
POTASSIUM SERPL-SCNC: 4 MEQ/L (ref 3.4–4.9)
PROTEIN UA: NEGATIVE MG/DL
RBC # BLD: 4.65 M/UL (ref 4.2–5.4)
S PYO AG THROAT QL: NORMAL
SODIUM BLD-SCNC: 141 MEQ/L (ref 135–144)
SPECIFIC GRAVITY UA: >=1.03 (ref 1–1.03)
TOTAL PROTEIN: 7.8 G/DL (ref 6.3–8)
TROPONIN: <0.01 NG/ML (ref 0–0.01)
URINE REFLEX TO CULTURE: YES
UROBILINOGEN, URINE: 0.2 E.U./DL
WBC # BLD: 10.3 K/UL (ref 4.8–10.8)
WBC UA: NORMAL /HPF (ref 0–5)

## 2020-01-21 PROCEDURE — 36415 COLL VENOUS BLD VENIPUNCTURE: CPT

## 2020-01-21 PROCEDURE — G8417 CALC BMI ABV UP PARAM F/U: HCPCS | Performed by: NURSE PRACTITIONER

## 2020-01-21 PROCEDURE — 87880 STREP A ASSAY W/OPTIC: CPT | Performed by: NURSE PRACTITIONER

## 2020-01-21 PROCEDURE — 80053 COMPREHEN METABOLIC PANEL: CPT

## 2020-01-21 PROCEDURE — 93010 ELECTROCARDIOGRAM REPORT: CPT | Performed by: INTERNAL MEDICINE

## 2020-01-21 PROCEDURE — 1036F TOBACCO NON-USER: CPT | Performed by: NURSE PRACTITIONER

## 2020-01-21 PROCEDURE — 83735 ASSAY OF MAGNESIUM: CPT

## 2020-01-21 PROCEDURE — 85025 COMPLETE CBC W/AUTO DIFF WBC: CPT

## 2020-01-21 PROCEDURE — G8482 FLU IMMUNIZE ORDER/ADMIN: HCPCS | Performed by: NURSE PRACTITIONER

## 2020-01-21 PROCEDURE — 87804 INFLUENZA ASSAY W/OPTIC: CPT | Performed by: NURSE PRACTITIONER

## 2020-01-21 PROCEDURE — 96374 THER/PROPH/DIAG INJ IV PUSH: CPT

## 2020-01-21 PROCEDURE — 2580000003 HC RX 258: Performed by: EMERGENCY MEDICINE

## 2020-01-21 PROCEDURE — 99285 EMERGENCY DEPT VISIT HI MDM: CPT

## 2020-01-21 PROCEDURE — 71045 X-RAY EXAM CHEST 1 VIEW: CPT

## 2020-01-21 PROCEDURE — 94640 AIRWAY INHALATION TREATMENT: CPT

## 2020-01-21 PROCEDURE — G8427 DOCREV CUR MEDS BY ELIG CLIN: HCPCS | Performed by: NURSE PRACTITIONER

## 2020-01-21 PROCEDURE — 87502 INFLUENZA DNA AMP PROBE: CPT

## 2020-01-21 PROCEDURE — 81001 URINALYSIS AUTO W/SCOPE: CPT

## 2020-01-21 PROCEDURE — 93005 ELECTROCARDIOGRAM TRACING: CPT

## 2020-01-21 PROCEDURE — 6360000002 HC RX W HCPCS: Performed by: EMERGENCY MEDICINE

## 2020-01-21 PROCEDURE — 84484 ASSAY OF TROPONIN QUANT: CPT

## 2020-01-21 PROCEDURE — 87086 URINE CULTURE/COLONY COUNT: CPT

## 2020-01-21 PROCEDURE — 6370000000 HC RX 637 (ALT 250 FOR IP): Performed by: EMERGENCY MEDICINE

## 2020-01-21 PROCEDURE — 96375 TX/PRO/DX INJ NEW DRUG ADDON: CPT

## 2020-01-21 PROCEDURE — 99213 OFFICE O/P EST LOW 20 MIN: CPT | Performed by: NURSE PRACTITIONER

## 2020-01-21 RX ORDER — METHYLPREDNISOLONE SODIUM SUCCINATE 125 MG/2ML
125 INJECTION, POWDER, LYOPHILIZED, FOR SOLUTION INTRAMUSCULAR; INTRAVENOUS ONCE
Status: COMPLETED | OUTPATIENT
Start: 2020-01-21 | End: 2020-01-21

## 2020-01-21 RX ORDER — GUAIFENESIN 600 MG/1
1200 TABLET, EXTENDED RELEASE ORAL 2 TIMES DAILY
Qty: 40 TABLET | Refills: 0 | Status: SHIPPED | OUTPATIENT
Start: 2020-01-21 | End: 2020-01-31

## 2020-01-21 RX ORDER — AZITHROMYCIN 250 MG/1
TABLET, FILM COATED ORAL
Qty: 6 TABLET | Refills: 0 | Status: CANCELLED | OUTPATIENT
Start: 2020-01-21 | End: 2020-01-31

## 2020-01-21 RX ORDER — 0.9 % SODIUM CHLORIDE 0.9 %
1000 INTRAVENOUS SOLUTION INTRAVENOUS ONCE
Status: COMPLETED | OUTPATIENT
Start: 2020-01-21 | End: 2020-01-21

## 2020-01-21 RX ORDER — KETOROLAC TROMETHAMINE 30 MG/ML
30 INJECTION, SOLUTION INTRAMUSCULAR; INTRAVENOUS ONCE
Status: COMPLETED | OUTPATIENT
Start: 2020-01-21 | End: 2020-01-21

## 2020-01-21 RX ORDER — METHYLPREDNISOLONE 4 MG/1
TABLET ORAL
Qty: 1 KIT | Refills: 0 | Status: SHIPPED | OUTPATIENT
Start: 2020-01-21 | End: 2020-01-27

## 2020-01-21 RX ORDER — IPRATROPIUM BROMIDE AND ALBUTEROL SULFATE 2.5; .5 MG/3ML; MG/3ML
1 SOLUTION RESPIRATORY (INHALATION) ONCE
Status: COMPLETED | OUTPATIENT
Start: 2020-01-21 | End: 2020-01-21

## 2020-01-21 RX ORDER — DOXYCYCLINE HYCLATE 100 MG
100 TABLET ORAL 2 TIMES DAILY
Qty: 20 TABLET | Refills: 0 | Status: SHIPPED | OUTPATIENT
Start: 2020-01-21 | End: 2020-02-05

## 2020-01-21 RX ORDER — NYSTATIN 100000 [USP'U]/G
POWDER TOPICAL
Qty: 60 G | Refills: 3 | Status: SHIPPED | OUTPATIENT
Start: 2020-01-21 | End: 2020-04-27 | Stop reason: ALTCHOICE

## 2020-01-21 RX ORDER — AZITHROMYCIN 250 MG/1
TABLET, FILM COATED ORAL
Qty: 1 PACKET | Refills: 0 | Status: SHIPPED | OUTPATIENT
Start: 2020-01-21 | End: 2020-01-25

## 2020-01-21 RX ORDER — TEMAZEPAM 15 MG/1
CAPSULE ORAL
COMMUNITY
Start: 2020-01-14 | End: 2020-04-27 | Stop reason: ALTCHOICE

## 2020-01-21 RX ORDER — PREDNISONE 10 MG/1
60 TABLET ORAL DAILY
Qty: 30 TABLET | Refills: 0 | Status: SHIPPED | OUTPATIENT
Start: 2020-01-21 | End: 2020-01-26

## 2020-01-21 RX ORDER — CARIPRAZINE 1.5 MG/1
CAPSULE, GELATIN COATED ORAL
COMMUNITY
Start: 2020-01-14 | End: 2020-03-09 | Stop reason: ALTCHOICE

## 2020-01-21 RX ORDER — NYSTATIN 100000 U/G
OINTMENT TOPICAL
Qty: 60 G | Refills: 1 | Status: SHIPPED | OUTPATIENT
Start: 2020-01-21 | End: 2020-04-27 | Stop reason: ALTCHOICE

## 2020-01-21 RX ORDER — DIPHENHYDRAMINE HCL 25 MG
25 CAPSULE ORAL EVERY 6 HOURS PRN
Qty: 20 CAPSULE | Refills: 0 | Status: SHIPPED | OUTPATIENT
Start: 2020-01-21 | End: 2020-01-31

## 2020-01-21 RX ADMIN — METHYLPREDNISOLONE SODIUM SUCCINATE 125 MG: 125 INJECTION, POWDER, FOR SOLUTION INTRAMUSCULAR; INTRAVENOUS at 12:27

## 2020-01-21 RX ADMIN — IPRATROPIUM BROMIDE AND ALBUTEROL SULFATE 1 AMPULE: .5; 3 SOLUTION RESPIRATORY (INHALATION) at 12:29

## 2020-01-21 RX ADMIN — KETOROLAC TROMETHAMINE 30 MG: 30 INJECTION, SOLUTION INTRAMUSCULAR at 11:25

## 2020-01-21 RX ADMIN — SODIUM CHLORIDE 1000 ML: 9 INJECTION, SOLUTION INTRAVENOUS at 11:25

## 2020-01-21 ASSESSMENT — ENCOUNTER SYMPTOMS
COLOR CHANGE: 0
BACK PAIN: 0
VOMITING: 0
DIARRHEA: 0
ABDOMINAL DISTENTION: 0
WHEEZING: 0
RHINORRHEA: 0
NAUSEA: 0
CHEST TIGHTNESS: 1
APNEA: 0
SHORTNESS OF BREATH: 1
SINUS PRESSURE: 0
SORE THROAT: 0
PHOTOPHOBIA: 0
CONSTIPATION: 0
EYE REDNESS: 1
EYE ITCHING: 1
EYE PAIN: 0
COUGH: 1
ABDOMINAL PAIN: 0

## 2020-01-21 ASSESSMENT — PAIN DESCRIPTION - ORIENTATION
ORIENTATION: MID
ORIENTATION: MID

## 2020-01-21 ASSESSMENT — PAIN DESCRIPTION - ONSET: ONSET: ON-GOING

## 2020-01-21 ASSESSMENT — PATIENT HEALTH QUESTIONNAIRE - PHQ9
2. FEELING DOWN, DEPRESSED OR HOPELESS: 0
SUM OF ALL RESPONSES TO PHQ QUESTIONS 1-9: 0
SUM OF ALL RESPONSES TO PHQ9 QUESTIONS 1 & 2: 0
1. LITTLE INTEREST OR PLEASURE IN DOING THINGS: 0
SUM OF ALL RESPONSES TO PHQ QUESTIONS 1-9: 0

## 2020-01-21 ASSESSMENT — PAIN DESCRIPTION - PAIN TYPE
TYPE: ACUTE PAIN
TYPE: ACUTE PAIN

## 2020-01-21 ASSESSMENT — PAIN SCALES - GENERAL
PAINLEVEL_OUTOF10: 8
PAINLEVEL_OUTOF10: 8

## 2020-01-21 ASSESSMENT — PAIN DESCRIPTION - FREQUENCY
FREQUENCY: CONTINUOUS
FREQUENCY: CONTINUOUS

## 2020-01-21 ASSESSMENT — PAIN DESCRIPTION - LOCATION
LOCATION: STERNUM
LOCATION: STERNUM

## 2020-01-21 ASSESSMENT — PAIN DESCRIPTION - DESCRIPTORS
DESCRIPTORS: PRESSURE
DESCRIPTORS: PRESSURE

## 2020-01-21 ASSESSMENT — PAIN DESCRIPTION - PROGRESSION: CLINICAL_PROGRESSION: NOT CHANGED

## 2020-01-21 ASSESSMENT — PAIN - FUNCTIONAL ASSESSMENT: PAIN_FUNCTIONAL_ASSESSMENT: PREVENTS OR INTERFERES SOME ACTIVE ACTIVITIES AND ADLS

## 2020-01-21 NOTE — TELEPHONE ENCOUNTER
Received a fax from pharmacy requesting medication refill.  Please approve or deny this request.    Rx requested:  Requested Prescriptions     Pending Prescriptions Disp Refills    albuterol sulfate  (90 Base) MCG/ACT inhaler 1 Inhaler 5     Sig: Inhale 2 puffs into the lungs every 6 hours as needed for Wheezing         Last Office Visit:   1/13/2020      Next Visit Date:  Future Appointments   Date Time Provider Courtney Kayla   1/28/2020  9:00 AM MD John Melgoza 94   5/4/2020 10:40 AM MD John Melgoza 94

## 2020-01-21 NOTE — ED PROVIDER NOTES
750 MG tablet Historical Med      !! Cholecalciferol 50 MCG (2000 UT) CAPS Take by mouthHistorical Med      tiZANidine (ZANAFLEX) 4 MG tablet Historical Med      QUEtiapine (SEROQUEL) 200 MG tablet Historical Med      ferrous sulfate dried (SLOW RELEASE IRON) 160 (50 Fe) MG TBCR extended release tablet Take 160 mg by mouth daily, Disp-30 tablet, R-5Normal      vitamin D (ERGOCALCIFEROL) 1.25 MG (50000 UT) CAPS capsule Take 1 capsule by mouth once a week, Disp-12 capsule, R-0Normal      !! Cholecalciferol (VITAMIN D3) 50 MCG (2000 UT) CAPS Take 1 capsule by mouth daily, Disp-90 capsule, R-3Normal      LORazepam (ATIVAN) 0.5 MG tablet Historical Med      Propylene Glycol 0.6 % SOLN 1 dropHistorical Med      cyclobenzaprine (FLEXERIL) 10 MG tablet Take 10 mg by mouth 3 times daily as needed for Muscle spasmsHistorical Med      hydrOXYzine (ATARAX) 50 MG tablet Take 150 mg by mouth 4 times dailyHistorical Med      levothyroxine (SYNTHROID) 75 MCG tablet Take 1 tablet by mouth Daily, Disp-30 tablet, R-5Normal      fluticasone (FLOVENT HFA) 110 MCG/ACT inhaler Inhale 2 puffs into the lungs 2 times daily, Disp-1 Inhaler, R-3Normal      nystatin (MYCOSTATIN) 184297 UNIT/GM cream Apply topically 2 times daily. , Disp-30 g, R-3, Normal      OXcarbazepine (TRILEPTAL) 300 MG tablet 300mg in am, 600mg at bedtimeHistorical Med      sertraline (ZOLOFT) 100 MG tablet Take 100 mg by mouthHistorical Med      meloxicam (MOBIC) 15 MG tablet Take 15 mg by mouthHistorical Med      albuterol sulfate  (90 Base) MCG/ACT inhaler Inhale 2 puffs into the lungs every 6 hours as needed for WheezingHistorical Med      albuterol (PROVENTIL) (2.5 MG/3ML) 0.083% nebulizer solution Inhale 2.5 mg into the lungsHistorical Med      ibuprofen (ADVIL;MOTRIN) 800 MG tablet Take 800 mg by mouthHistorical Med      montelukast (SINGULAIR) 10 MG tablet Take 10 mg by mouthHistorical Med      cetirizine (ZYRTEC) 10 MG tablet Take 10 mg by mouthHistorical methanphetamine use in the past, last time was 11 years ago    Sexual activity: Not Currently     Partners: Male     Comment: no history of STI   Lifestyle    Physical activity:     Days per week: None     Minutes per session: None    Stress: None   Relationships    Social connections:     Talks on phone: None     Gets together: None     Attends Congregation service: None     Active member of club or organization: None     Attends meetings of clubs or organizations: None     Relationship status: None    Intimate partner violence:     Fear of current or ex partner: None     Emotionally abused: None     Physically abused: None     Forced sexual activity: None   Other Topics Concern    None   Social History Narrative    Lives with one daughter         2 dogs        Hobbies: reading, music       SCREENINGS             PHYSICAL EXAM    (up to 7 for level 4, 8 or more for level 5)     ED Triage Vitals [01/21/20 1104]   BP Temp Temp Source Pulse Resp SpO2 Height Weight   -- 98 °F (36.7 °C) Oral 86 18 98 % 5' 5\" (1.651 m) (!) 384 lb (174.2 kg)       Physical Exam  Vitals signs and nursing note reviewed. Constitutional:       General: She is not in acute distress. Appearance: She is well-developed. She is obese. She is not ill-appearing, toxic-appearing or diaphoretic. HENT:      Head: Normocephalic and atraumatic. Nose: Congestion present. No rhinorrhea. Mouth/Throat:      Mouth: Mucous membranes are moist.      Pharynx: Oropharynx is clear. No oropharyngeal exudate or posterior oropharyngeal erythema. Eyes:      General: No scleral icterus. Right eye: No discharge. Left eye: No discharge. Conjunctiva/sclera: Conjunctivae normal.      Pupils: Pupils are equal, round, and reactive to light. Neck:      Musculoskeletal: Normal range of motion and neck supple. No neck rigidity or muscular tenderness. Thyroid: No thyromegaly. Vascular: No carotid bruit or JVD.       Trachea: changes. RADIOLOGY:   Non-plain film images such as CT, Ultrasound and MRI are read by the radiologist. Juli Jesuss radiographicimages are visualized and preliminarily interpreted by the emergency physician with the below findings:    Chest x-ray showed no acute cardiopulmonary process. Interpretation per the Radiologist below, if available at the time of this note:    XR CHEST PORTABLE   Final Result   NEGATIVE PORTABLE CHEST RADIOGRAPH            ED BEDSIDE ULTRASOUND:   Performed by ED Physician - none    LABS:  Labs Reviewed   COMPREHENSIVE METABOLIC PANEL - Abnormal; Notable for the following components:       Result Value    Glucose 106 (*)     Globulin 3.7 (*)     All other components within normal limits   CBC WITH AUTO DIFFERENTIAL - Abnormal; Notable for the following components:    MCV 81.1 (*)     MCH 26.2 (*)     MCHC 32.3 (*)     Platelets 648 (*)     Neutrophils Absolute 8.0 (*)     All other components within normal limits   URINE CULTURE    Narrative:     ORDER#: [id number]                          ORDERED BY: Tanmay Barton  SOURCE: Urine Clean Catch                  COLLECTED:  01/21/20 11:15  ANTIBIOTICS AT [facility].:                      RECEIVED :  01/21/20 16:14   RAPID INFLUENZA A/B ANTIGENS   MAGNESIUM   URINE RT REFLEX TO CULTURE   TROPONIN   MICROSCOPIC URINALYSIS       All other labs were within normal range or not returned as of this dictation.     EMERGENCY DEPARTMENT COURSE and DIFFERENTIALDIAGNOSIS/MDM:   Vitals:    Vitals:    01/21/20 1104 01/21/20 1110   BP:  123/67   Pulse: 86    Resp: 18    Temp: 98 °F (36.7 °C)    TempSrc: Oral    SpO2: 98%    Weight: (!) 384 lb (174.2 kg)    Height: 5' 5\" (1.651 m)            MDM  Number of Diagnoses or Management Options     Amount and/or Complexity of Data Reviewed  Clinical lab tests: reviewed and ordered  Tests in the radiology section of CPT®: reviewed and ordered  Tests in the medicine section of CPT®: ordered and reviewed    Risk of Complications, Morbidity, and/or Mortality  Presenting problems: moderate  Diagnostic procedures: moderate  Management options: moderate    Patient Progress  Patient progress: improved      CRITICAL CARE TIME   Total Critical Care time was  minutes, excluding separately reportable procedures. There was a high probability of clinically significant/life threatening deterioration in the patient's condition which required my urgentintervention. CONSULTS:  None    PROCEDURES:  Unless otherwise noted below, none     Procedures    FINAL IMPRESSION      1. Acute bronchitis, unspecified organism    2. Mild intermittent asthma without complication    3.  Dermatitis          DISPOSITION/PLAN   DISPOSITION Decision To Discharge 01/21/2020 01:12:41 PM      PATIENT REFERRED TO:  Brenna King MD  Neli Mendez   [phone]    In 3 days        DISCHARGE MEDICATIONS:  Discharge Medication List as of 1/21/2020 12:54 PM      START taking these medications    Details   azithromycin (ZITHROMAX Z-ALIS) 250 MG tablet Take 2 tablets (500 mg) on Day 1, and then take 1 tablet (250 mg) on days 2 through 5., Disp-1 packet, R-0Print      predniSONE (DELTASONE) 10 MG tablet Take 6 tablets by mouth daily for 5 doses, Disp-30 tablet, R-0Print      guaiFENesin (MUCINEX) 600 MG extended release tablet Take 2 tablets by mouth 2 times daily for 10 days, Disp-40 tablet, R-0Print      diphenhydrAMINE (BENADRYL) 25 MG capsule Take 1 capsule by mouth every 6 hours as needed for Itching, Disp-20 capsule, R-0Print                (Please note that portions of this note were completed with a voice recognitionprogram.  Efforts were made to edit the dictations but occasionally words are mis-transcribed.)    Jojo Elizondo MD (electronically signed)  Attending Emergency Physician          Jojo Elizondo MD  01/21/20 9937 Marco Sebastian Se, MD  01/23/20 7902

## 2020-01-21 NOTE — PROGRESS NOTES
Bandar Shah is a 40 y.o. female presenting for cough. HPI:  Cough: Patient complains of chills, dyspnea, eye irritation, nasal congestion, nonproductive cough, productive cough, rhinorrhea blood tinged and light green and sore throat. Symptoms began 4 days ago. The cough is harsh and is aggravated by cold air Associated symptoms include:postnasal drip, shortness of breath and sputum production. Patient does have a history of asthma. Patient does have a history of environmental allergens. SH:Patient does have a history of smoking. She is not a current smoker. She states that she has also been having chest pain. She states that the pain is in the center of her chest and feels like something is pressing her into the floor or against the wall depending on how she is positioned. When she takes a deep breath, the pain is a 9.5 out of 10. She does not have any cardiac history that she is aware of. No family history of heart disease. Pain is described as sharp and burning. She states that she got very short of breath walking back to the exam room today from the waiting room. Allergies   Allergen Reactions    Hydroxyzine Hcl Hives    Iodinated Diagnostic Agents Hives    Iv Contrast [Iodides]     Lamictal [Lamotrigine]     Pcn [Penicillins]     Shellfish-Derived Products     Methylphenidate Rash    Neosporin [Neomycin-Polymyxin-Gramicidin] Rash     Red lines that look infected and spreads    Tramadol Nausea And Vomiting    Voltaren [Diclofenac Sodium] Rash       EXAM:  Constitutional Blood pressure 108/64, pulse 102, temperature 97.6 °F (36.4 °C), temperature source Temporal, resp. rate 18, height 5' 5\" (1.651 m), weight (!) 384 lb (174.2 kg), last menstrual period 01/05/2020, SpO2 97 %, not currently breastfeeding. .  She has a normal affect, no acute distress, appears well developed and well nourished.    Skin: evidence of eczema rash to inner arm folds and posterior knee folds with fungal dermatitis. Intertrigo to posterior right knee fold. Ears:  TM- bilateral-  injected and fluid-  pale yellow  Nose/Sinuses:  negative except for mucosa erythematous and swollen  Mouth/Throat:  Throat- erythema  Neck:  neck- supple, no mass, non-tender and no bruits  Lungs:  Normal expansion. Clear to auscultation but diminished. No rales, rhonchi, or wheezing., No chest wall tenderness. Heart:  Heart sounds are normal.  Regular rate and rhythm without murmur, gallop or rub. DIAGNOSIS:    Diagnosis Orders   1. Cough  POCT Influenza A/B    methylPREDNISolone (MEDROL DOSEPACK) 4 MG tablet    CBC Auto Differential   2. Sore throat  POCT rapid strep A    doxycycline hyclate (VIBRA-TABS) 100 MG tablet   3. Cold sweat  POCT Influenza A/B   4. Chills  POCT Influenza A/B    CBC Auto Differential   5. Screening for HIV (human immunodeficiency virus)  HIV-1,-2 w/Reflex to HIV-1 Western Blot   6. Chest pain, unspecified type  Troponin    XR CHEST STANDARD (2 VW)   7. Moderate persistent asthma without complication  methylPREDNISolone (MEDROL DOSEPACK) 4 MG tablet   8. Bilateral otitis media, unspecified otitis media type  doxycycline hyclate (VIBRA-TABS) 100 MG tablet   9. Shortness of breath  XR CHEST STANDARD (2 VW)   10. Fungal dermatitis  nystatin (MYCOSTATIN) 745943 UNIT/GM ointment   11. Candidal intertrigo  nystatin (MYCOSTATIN) 985450 UNIT/GM powder       PLAN: Include orders in the DX section. Follow up as needed if symptoms worsen or fail to improve. Recommend antibiotic and Medrol Dosepak for current symptoms. Discussed  Recommendation for ER but if she opted against this chest x-ray and troponin has been ordered. With pleuritic component to chest pain however ER is strongly advised. She verbalizes understanding and the patient is escorted to the emergency room by myself and my student. Follow with PCP next week as scheduled.     Additional antifungal cream given for fungal dermatitis to various

## 2020-01-22 RX ORDER — ALBUTEROL SULFATE 90 UG/1
2 AEROSOL, METERED RESPIRATORY (INHALATION) EVERY 6 HOURS PRN
Qty: 1 INHALER | Refills: 1 | Status: SHIPPED | OUTPATIENT
Start: 2020-01-22 | End: 2020-03-02

## 2020-01-23 LAB — URINE CULTURE, ROUTINE: NORMAL

## 2020-02-05 ENCOUNTER — OFFICE VISIT (OUTPATIENT)
Dept: FAMILY MEDICINE CLINIC | Age: 45
End: 2020-02-05
Payer: COMMERCIAL

## 2020-02-05 VITALS
OXYGEN SATURATION: 98 % | BODY MASS INDEX: 48.82 KG/M2 | SYSTOLIC BLOOD PRESSURE: 136 MMHG | WEIGHT: 293 LBS | HEART RATE: 98 BPM | TEMPERATURE: 98.4 F | RESPIRATION RATE: 18 BRPM | HEIGHT: 65 IN | DIASTOLIC BLOOD PRESSURE: 88 MMHG

## 2020-02-05 PROBLEM — S86.019A RUPTURE OF ACHILLES TENDON: Status: RESOLVED | Noted: 2018-11-29 | Resolved: 2020-02-05

## 2020-02-05 PROBLEM — S86.119A: Status: ACTIVE | Noted: 2018-11-29

## 2020-02-05 PROBLEM — H04.123 BILATERAL DRY EYES: Status: ACTIVE | Noted: 2019-10-30

## 2020-02-05 PROBLEM — H43.393 FLOATERS, BILATERAL: Status: ACTIVE | Noted: 2019-10-30

## 2020-02-05 PROBLEM — S86.019A RUPTURE OF ACHILLES TENDON: Status: ACTIVE | Noted: 2018-11-29

## 2020-02-05 PROBLEM — S86.119A: Status: RESOLVED | Noted: 2018-11-29 | Resolved: 2020-02-05

## 2020-02-05 PROCEDURE — 99213 OFFICE O/P EST LOW 20 MIN: CPT | Performed by: FAMILY MEDICINE

## 2020-02-05 RX ORDER — ORPHENADRINE CITRATE 100 MG/1
TABLET, EXTENDED RELEASE ORAL
COMMUNITY
Start: 2020-02-04 | End: 2020-04-27 | Stop reason: ALTCHOICE

## 2020-02-05 RX ORDER — MELOXICAM 15 MG/1
TABLET ORAL
COMMUNITY
Start: 2020-02-03 | End: 2020-03-09 | Stop reason: ALTCHOICE

## 2020-02-05 RX ORDER — IPRATROPIUM BROMIDE 42 UG/1
2 SPRAY, METERED NASAL 3 TIMES DAILY
Qty: 15 ML | Refills: 0 | Status: SHIPPED | OUTPATIENT
Start: 2020-02-05 | End: 2020-04-27 | Stop reason: SDUPTHER

## 2020-02-05 RX ORDER — GUAIFENESIN 600 MG/1
600 TABLET, EXTENDED RELEASE ORAL 2 TIMES DAILY
Qty: 30 TABLET | Refills: 0 | Status: SHIPPED | OUTPATIENT
Start: 2020-02-05 | End: 2020-02-20

## 2020-02-05 RX ORDER — BENZONATATE 100 MG/1
100 CAPSULE ORAL 3 TIMES DAILY PRN
Qty: 21 CAPSULE | Refills: 0 | Status: SHIPPED | OUTPATIENT
Start: 2020-02-05 | End: 2020-02-12

## 2020-02-05 RX ORDER — PREGABALIN 75 MG/1
CAPSULE ORAL
COMMUNITY
Start: 2020-02-04

## 2020-02-05 ASSESSMENT — ENCOUNTER SYMPTOMS
TROUBLE SWALLOWING: 0
EYE DISCHARGE: 0
DIARRHEA: 0
VOMITING: 0
SHORTNESS OF BREATH: 0
RHINORRHEA: 1
SORE THROAT: 0
SINUS PAIN: 0
COUGH: 1
WHEEZING: 0
SINUS PRESSURE: 1
NAUSEA: 0
ABDOMINAL PAIN: 0
CHEST TIGHTNESS: 0

## 2020-02-05 NOTE — PROGRESS NOTES
Subjective:      Patient ID: Robert Stein is a 40 y.o. female who presents today for:     Chief Complaint   Patient presents with    Cough     Patient presents today with cough that just started today, post nasal drip, nasal congestion and facial pressure. Patient was last seen on 01/21/2020 and was given doxyxycline and prednisolone but her symptoms are still present. HPI     Patient presents for acute visit regarding respiratory symptoms. She was seen in the office on 01/21/2020 with 4-day history of chills, nasal congestion, nonproductive cough, runny nose, and sore throat with substernal chest pain. Vitals showed heart rate 102 otherwise was unremarkable, exam showed bilateral injected tympanic membranes with effusions. She was referred to the emergency room due to reported chest discomfort. In the ER she had a negative chest x-ray and lab work including CMP, CBC, troponin, rapid flu A/B, and urine testing was unremarkable. She was thought to have acute bronchitis with asthma exacerbation and was given a prescription for Z-Joey, 60 mg prednisone burst x5 days, and 10-day course of Mucinex twice daily. Patient reports that her symptoms resolved after finishing her course of azithromycin and prednisone following her ER visit. She states that she felt at her normal baseline until yesterday when she developed return of nasal congestion, post-nasal drainage, and runny nose with sinus fullness. She denies any F/C/S, H/A, ear pain, sore throat, cough, dyspnea, wheezing, chest pain, abdominal pain, N/V, or diarrhea. She reports daughter sick at home with similar symptoms over the past 4 days. She has tried no over-the-counter medication for management.     Past Medical History:   Diagnosis Date    Anxiety     Asthma     History of substance abuse ([facility] [address] 10/10/2019    Perennial allergic rhinitis 10/10/2019    Primary osteoarthritis involving multiple joints 10/10/2019    Rupture of Achilles tendon 2018    Rupture of gastrocnemius muscle 2018     Past Surgical History:   Procedure Laterality Date    TUBAL LIGATION       Family History   Problem Relation Age of Onset    High Blood Pressure Mother     High Cholesterol Mother     Diabetes Mother     Kidney Disease Mother     High Blood Pressure Father     High Cholesterol Father     COPD Father     Heart Failure Father     Breast Cancer Sister 40    Breast Cancer Maternal Grandmother 59    No Known Problems Maternal Grandfather     No Known Problems Paternal Grandmother     No Known Problems Paternal Grandfather     Heart Failure Brother     Cancer Maternal Uncle         unknown     Social History     Socioeconomic History    Marital status: Single     Spouse name: Not on file    Number of children: Not on file    Years of education: Not on file    Highest education level: Not on file   Occupational History    Occupation: on disability due to knee and back pain   Social Needs    Financial resource strain: Not on file    Food insecurity:     Worry: Not on file     Inability: Not on file    Transportation needs:     Medical: Not on file     Non-medical: Not on file   Tobacco Use    Smoking status: Former Smoker     Packs/day: 2.00     Years: 27.00     Pack years: 54.00     Types: Cigarettes     Start date:      Last attempt to quit:      Years since quittin.1    Smokeless tobacco: Never Used   Substance and Sexual Activity    Alcohol use: Yes     Frequency: Monthly or less     Drinks per session: 1 or 2     Binge frequency: Never    Drug use: Never     Comment: hx marijuana, cocaine, PCP, methanphetamine use in the past, last time was 11 years ago    Sexual activity: Not Currently     Partners: Male     Comment: no history of STI   Lifestyle    Physical activity:     Days per week: Not on file     Minutes per session: Not on file    Stress: Not on file   Relationships    Social connections:     Talks on phone: Not on file     Gets together: Not on file     Attends Mandaen service: Not on file     Active member of club or organization: Not on file     Attends meetings of clubs or organizations: Not on file     Relationship status: Not on file    Intimate partner violence:     Fear of current or ex partner: Not on file     Emotionally abused: Not on file     Physically abused: Not on file     Forced sexual activity: Not on file   Other Topics Concern    Not on file   Social History Narrative    Lives with one daughter         2 dogs        Hobbies: reading, music     Current Outpatient Medications on File Prior to Visit   Medication Sig Dispense Refill    albuterol sulfate  (90 Base) MCG/ACT inhaler Inhale 2 puffs into the lungs every 6 hours as needed for Wheezing or Shortness of Breath 1 Inhaler 1    VRAYLAR 1.5 MG capsule       temazepam (RESTORIL) 15 MG capsule       nystatin (MYCOSTATIN) 764703 UNIT/GM ointment Apply topically 2 times daily. 60 g 1    nystatin (MYCOSTATIN) 390743 UNIT/GM powder Apply 3 times daily.  60 g 3    hydrOXYzine (VISTARIL) 50 MG capsule Take 50 mg by mouth 3 times daily as needed for Itching      pregabalin (LYRICA) 25 MG capsule       nabumetone (RELAFEN) 750 MG tablet       Cholecalciferol 50 MCG (2000 UT) CAPS Take by mouth      tiZANidine (ZANAFLEX) 4 MG tablet       QUEtiapine (SEROQUEL) 200 MG tablet       ferrous sulfate dried (SLOW RELEASE IRON) 160 (50 Fe) MG TBCR extended release tablet Take 160 mg by mouth daily 30 tablet 5    vitamin D (ERGOCALCIFEROL) 1.25 MG (50000 UT) CAPS capsule Take 1 capsule by mouth once a week 12 capsule 0    Cholecalciferol (VITAMIN D3) 50 MCG (2000 UT) CAPS Take 1 capsule by mouth daily 90 capsule 3    LORazepam (ATIVAN) 0.5 MG tablet       Propylene Glycol 0.6 % SOLN 1 drop      cyclobenzaprine (FLEXERIL) 10 MG tablet Take 10 mg by mouth 3 times daily as needed for Muscle spasms      hydrOXYzine (ATARAX) 50 MG tablet palpitations and leg swelling. Gastrointestinal: Negative for abdominal pain, diarrhea, nausea and vomiting. Genitourinary: Negative for dysuria and hematuria. Musculoskeletal: Negative for myalgias. Skin: Negative for rash. Neurological: Negative for dizziness, syncope, light-headedness and headaches. Objective:     BP (!) 148/94 (Site: Right Lower Arm, Position: Sitting, Cuff Size: Large Adult)   Pulse 98   Temp 98.4 °F (36.9 °C) (Temporal)   Resp 18   Ht 5' 5\" (1.651 m)   Wt (!) 384 lb (174.2 kg)   LMP 01/05/2020   SpO2 98%   BMI 63.90 kg/m²     Physical Exam  Vitals signs reviewed. Constitutional:       General: She is not in acute distress. Appearance: She is not ill-appearing, toxic-appearing or diaphoretic. HENT:      Head: Normocephalic and atraumatic. Salivary Glands: Right salivary gland is not diffusely enlarged or tender. Left salivary gland is not diffusely enlarged or tender. Right Ear: Tympanic membrane, ear canal and external ear normal. No middle ear effusion. Tympanic membrane is not erythematous. Left Ear: Tympanic membrane, ear canal and external ear normal.  No middle ear effusion. Tympanic membrane is not erythematous. Nose: Mucosal edema, congestion and rhinorrhea present. Rhinorrhea is clear. Right Turbinates: Enlarged and swollen. Not pale. Left Turbinates: Enlarged and swollen. Not pale. Right Sinus: No maxillary sinus tenderness or frontal sinus tenderness. Left Sinus: No maxillary sinus tenderness or frontal sinus tenderness. Mouth/Throat:      Lips: Pink. No lesions. Mouth: Mucous membranes are moist. No oral lesions. Dentition: Normal dentition. Tongue: No lesions. Palate: No mass and lesions. Pharynx: Oropharynx is clear. No oropharyngeal exudate or posterior oropharyngeal erythema. Tonsils: No tonsillar exudate. Eyes:      General:         Right eye: No discharge.          Left

## 2020-02-11 ENCOUNTER — HOSPITAL ENCOUNTER (OUTPATIENT)
Dept: PHYSICAL THERAPY | Age: 45
Setting detail: THERAPIES SERIES
Discharge: HOME | End: 2020-02-11
Payer: COMMERCIAL

## 2020-02-11 PROCEDURE — 97163 PT EVAL HIGH COMPLEX 45 MIN: CPT

## 2020-02-11 PROCEDURE — 97110 THERAPEUTIC EXERCISES: CPT

## 2020-02-11 ASSESSMENT — PAIN SCALES - GENERAL: PAINLEVEL_OUTOF10: 9

## 2020-02-11 ASSESSMENT — PAIN DESCRIPTION - ONSET: ONSET: ON-GOING

## 2020-02-11 ASSESSMENT — PAIN DESCRIPTION - DESCRIPTORS: DESCRIPTORS: ACHING

## 2020-02-11 ASSESSMENT — PAIN DESCRIPTION - ORIENTATION: ORIENTATION: RIGHT;LEFT

## 2020-02-11 ASSESSMENT — PAIN DESCRIPTION - PROGRESSION: CLINICAL_PROGRESSION: NOT CHANGED

## 2020-02-11 ASSESSMENT — PAIN DESCRIPTION - PAIN TYPE: TYPE: CHRONIC PAIN

## 2020-02-11 ASSESSMENT — PAIN - FUNCTIONAL ASSESSMENT: PAIN_FUNCTIONAL_ASSESSMENT: PREVENTS OR INTERFERES WITH MANY ACTIVE NOT PASSIVE ACTIVITIES

## 2020-02-11 ASSESSMENT — PAIN DESCRIPTION - FREQUENCY: FREQUENCY: CONTINUOUS

## 2020-02-11 NOTE — PROGRESS NOTES
Physical Therapy  Initial Assessment  Date: 2020  Patient Name: Yvon Carpio  MRN: [id number]  : 1975     Treatment Diagnosis: Multiple site OA, chronic back and neck pain    Restrictions       Subjective   General  Chart Reviewed: Yes  Patient assessed for rehabilitation services?: Yes  Family / Caregiver Present: No  Referring Practitioner: Dr. Shahida Yanes in pain management; Dr. Jameson Luther PCP  Referral Date : 20  Diagnosis: CHronic back and neck pain; multiple site joint pain  General Comment  Comments: states she can't walk for more than 6 mins. States she can do short shopping trips, but if longer uses the scooter. PT Visit Information  Onset Date: (\"years\")  Total # of Visits Approved: 10  Total # of Visits to Date: 1  Plan of Care/Certification Expiration Date: 20  No Show: 0  Canceled Appointment: 0  Subjective  Subjective: Pt reports she has had issues with back and neck \"for years\", as well multiple joints \"my knees, my hips, my arms, my hands\". Chronic in nature, has never had any therapy for these issues. States the pain is constant, worse with walking, house cleaning (sweeping/mopping), any prolonged position (driving, sitting too long, standing too long) States she did have an injury to her R knee >20 yrs. ago, and so that knee is worse than the L now. Pain Screening  Patient Currently in Pain: Yes  Pain Assessment  Pain Assessment: 0-10  Pain Level: 9  Pain Type: Chronic pain  Pain Location: Back;Neck;Knee;Hip  Pain Orientation: Right;Left  Pain Radiating Towards: reports can get numbness/tingling in her hands usually in am. Occ \"curling\" in toes/cramping.    Pain Descriptors: Aching  Pain Frequency: Continuous  Pain Onset: On-going  Clinical Progression: Not changed(pain itself unchanged, but feels her ability to tolerate ADLs is decreasing)  Functional Pain Assessment: Prevents or interferes with many active not passive activities  Vital Signs  Patient Currently in Pain: pain conditions  Long term goal 3: Pt. report ability to ambulate wtih walker for >20 mins.  total to demonstrate increased activity tolerance and promote more healthy lifestyle  Long term goal 4: Pt. demo ability to perform full cervical and lumbar ROM without increased pain   Patient Goals   Patient goals : Get pain to below a 7/10       Therapy Time   Individual Concurrent Group Co-treatment   Time In 1105         Time Out 1150         Minutes [address], PT

## 2020-02-18 ENCOUNTER — HOSPITAL ENCOUNTER (OUTPATIENT)
Dept: PHYSICAL THERAPY | Age: 45
Setting detail: THERAPIES SERIES
Discharge: HOME | End: 2020-02-18
Payer: COMMERCIAL

## 2020-02-18 PROCEDURE — 97110 THERAPEUTIC EXERCISES: CPT

## 2020-02-18 PROCEDURE — G0283 ELEC STIM OTHER THAN WOUND: HCPCS

## 2020-02-18 ASSESSMENT — PAIN DESCRIPTION - PAIN TYPE: TYPE: CHRONIC PAIN

## 2020-02-18 ASSESSMENT — PAIN SCALES - GENERAL: PAINLEVEL_OUTOF10: 9

## 2020-02-18 NOTE — PROGRESS NOTES
use of cane or pressure point device for trigger point release in upper trap/scapular border; discussed using cane or getting device from store such as 5 below                          Assessment:   Conditions Requiring Skilled Therapeutic Intervention  Body structures, Functions, Activity limitations: Decreased functional mobility ; Decreased high-level IADLs;Decreased posture;Decreased ADL status; Decreased endurance;Decreased ROM; Decreased strength; Increased pain  Assessment: Pt with good recall and performance of previously issued exercises. APpears motivated and compliant. Did have aggravation of the coccyx region with pelvic tilts so removed at this time from HEP. Tolerated addition of hip/leg stretches. Pain down to 5/10 after interventions this date showing good resonse to therapy, need to determine length of relief. COntineus to bnefit from skilled PT to help decrase pain, improve mobility and educate and longterm managemetn of chronic conditions. Treatment Diagnosis: Multiple site OA, chronic back and neck pain  Prognosis: Fair  Decision Making: High Complexity  Barriers to Learning: chronic pain  REQUIRES PT FOLLOW UP: Yes  Treatment Initiated : Educated pt.  on various modalities for ipain control- trigger point release device, heat, TENS; handout for updated HEP  Activity Tolerance  Activity Tolerance: Patient Tolerated treatment well  Comments: pain down to 5/10 after treatment      Goals:  Short term goals  Time Frame for Short term goals: 3 visits   Short term goal 1: decrease pain to 8/10  Short term goal 2: return demo HEP for general stretching for neck, back,extremities with >75% accuracy  Short term goal 3: Pt. to obtain heavy duty rollator style walker to assist with ability to walk further distances with improved tolerance  Long term goals  Time Frame for Long term goals : 6-8 sessions  Long term goal 1: Decrease avg pain to 6/10 wtih max pain no higher than 8/10  Long term goal 2: independent

## 2020-02-25 ENCOUNTER — HOSPITAL ENCOUNTER (OUTPATIENT)
Dept: PHYSICAL THERAPY | Age: 45
Setting detail: THERAPIES SERIES
Discharge: HOME | End: 2020-02-25
Payer: COMMERCIAL

## 2020-02-25 PROCEDURE — G0283 ELEC STIM OTHER THAN WOUND: HCPCS

## 2020-02-25 PROCEDURE — 97110 THERAPEUTIC EXERCISES: CPT

## 2020-02-25 ASSESSMENT — PAIN SCALES - GENERAL: PAINLEVEL_OUTOF10: 8

## 2020-02-25 ASSESSMENT — PAIN DESCRIPTION - PAIN TYPE: TYPE: CHRONIC PAIN

## 2020-03-03 ENCOUNTER — HOSPITAL ENCOUNTER (OUTPATIENT)
Dept: PHYSICAL THERAPY | Age: 45
Setting detail: THERAPIES SERIES
Discharge: HOME | End: 2020-03-03
Payer: COMMERCIAL

## 2020-03-03 PROCEDURE — 97110 THERAPEUTIC EXERCISES: CPT

## 2020-03-03 PROCEDURE — G0283 ELEC STIM OTHER THAN WOUND: HCPCS

## 2020-03-03 ASSESSMENT — PAIN DESCRIPTION - PAIN TYPE: TYPE: CHRONIC PAIN

## 2020-03-03 ASSESSMENT — PAIN SCALES - GENERAL: PAINLEVEL_OUTOF10: 9

## 2020-03-09 ENCOUNTER — HOSPITAL ENCOUNTER (OUTPATIENT)
Dept: LAB | Age: 45
Discharge: HOME | End: 2020-03-09
Payer: COMMERCIAL

## 2020-03-09 ENCOUNTER — OFFICE VISIT (OUTPATIENT)
Dept: FAMILY MEDICINE CLINIC | Age: 45
End: 2020-03-09
Payer: COMMERCIAL

## 2020-03-09 VITALS
RESPIRATION RATE: 14 BRPM | HEART RATE: 73 BPM | SYSTOLIC BLOOD PRESSURE: 130 MMHG | OXYGEN SATURATION: 99 % | TEMPERATURE: 97.7 F | DIASTOLIC BLOOD PRESSURE: 80 MMHG | WEIGHT: 293 LBS | HEIGHT: 66 IN | BODY MASS INDEX: 47.09 KG/M2

## 2020-03-09 LAB
ANION GAP SERPL CALCULATED.3IONS-SCNC: 13 MEQ/L (ref 9–15)
BUN BLDV-MCNC: 9 MG/DL (ref 6–20)
CALCIUM SERPL-MCNC: 9.2 MG/DL (ref 8.5–9.9)
CHLORIDE BLD-SCNC: 101 MEQ/L (ref 95–107)
CO2: 26 MEQ/L (ref 20–31)
CREAT SERPL-MCNC: 0.68 MG/DL (ref 0.5–0.9)
GFR AFRICAN AMERICAN: >60
GFR NON-AFRICAN AMERICAN: >60
GLUCOSE BLD-MCNC: 91 MG/DL (ref 70–99)
POTASSIUM SERPL-SCNC: 4.9 MEQ/L (ref 3.4–4.9)
SODIUM BLD-SCNC: 140 MEQ/L (ref 135–144)
TOTAL CK: 36 U/L (ref 0–170)

## 2020-03-09 PROCEDURE — 36415 COLL VENOUS BLD VENIPUNCTURE: CPT

## 2020-03-09 PROCEDURE — 82550 ASSAY OF CK (CPK): CPT

## 2020-03-09 PROCEDURE — 99213 OFFICE O/P EST LOW 20 MIN: CPT | Performed by: FAMILY MEDICINE

## 2020-03-09 PROCEDURE — 80048 BASIC METABOLIC PNL TOTAL CA: CPT

## 2020-03-09 RX ORDER — ZOLPIDEM TARTRATE 10 MG/1
TABLET ORAL
COMMUNITY
Start: 2020-02-13

## 2020-03-09 RX ORDER — CARIPRAZINE 3 MG/1
CAPSULE, GELATIN COATED ORAL
COMMUNITY
Start: 2020-02-14 | End: 2020-04-27 | Stop reason: ALTCHOICE

## 2020-03-09 RX ORDER — ALBUTEROL SULFATE 2.5 MG/3ML
2.5 SOLUTION RESPIRATORY (INHALATION) EVERY 6 HOURS PRN
Qty: 120 EACH | Refills: 1 | Status: SHIPPED | OUTPATIENT
Start: 2020-03-09 | End: 2020-06-01 | Stop reason: SDUPTHER

## 2020-03-09 ASSESSMENT — ENCOUNTER SYMPTOMS
DIARRHEA: 0
ABDOMINAL PAIN: 0
NAUSEA: 1
BACK PAIN: 1
VOMITING: 0
SHORTNESS OF BREATH: 0
CHEST TIGHTNESS: 0

## 2020-03-09 NOTE — PROGRESS NOTES
Age of Onset    High Blood Pressure Mother     High Cholesterol Mother     Diabetes Mother     Kidney Disease Mother     High Blood Pressure Father     High Cholesterol Father     COPD Father     Heart Failure Father     Breast Cancer Sister 40    Breast Cancer Maternal Grandmother 59    No Known Problems Maternal Grandfather     No Known Problems Paternal Grandmother     No Known Problems Paternal Grandfather     Heart Failure Brother     Cancer Maternal Uncle         unknown     Social History     Socioeconomic History    Marital status: Single     Spouse name: Not on file    Number of children: Not on file    Years of education: Not on file    Highest education level: Not on file   Occupational History    Occupation: on disability due to knee and back pain   Social Needs    Financial resource strain: Not on file    Food insecurity     Worry: Not on file     Inability: Not on file   [employer] needs     Medical: Not on file     Non-medical: Not on file   Tobacco Use    Smoking status: Former Smoker     Packs/day: 2.00     Years: 27.00     Pack years: 54.00     Types: Cigarettes     Start date:      Last attempt to quit:      Years since quittin.1    Smokeless tobacco: Never Used   Substance and Sexual Activity    Alcohol use: Yes     Frequency: Monthly or less     Drinks per session: 1 or 2     Binge frequency: Never    Drug use: Never     Comment: hx marijuana, cocaine, PCP, methanphetamine use in the past, last time was 11 years ago    Sexual activity: Not Currently     Partners: Male     Comment: no history of STI   Lifestyle    Physical activity     Days per week: Not on file     Minutes per session: Not on file    Stress: Not on file   Relationships    Social connections     Talks on phone: Not on file     Gets together: Not on file     Attends Jewish service: Not on file     Active member of club or organization: Not on file     Attends meetings of clubs or 66.04 kg/m²     Physical Exam  Vitals signs reviewed. Constitutional:       General: She is not in acute distress. Appearance: She is not ill-appearing or diaphoretic. Cardiovascular:      Rate and Rhythm: Normal rate and regular rhythm. Pulmonary:      Effort: Pulmonary effort is normal.      Breath sounds: Normal breath sounds. No wheezing. Musculoskeletal:      Right wrist: She exhibits tenderness. She exhibits normal range of motion, no bony tenderness, no swelling, no effusion, no crepitus, no deformity and no laceration. Left wrist: She exhibits tenderness. She exhibits normal range of motion, no bony tenderness, no swelling, no effusion, no crepitus, no deformity and no laceration. Right hand: She exhibits normal range of motion, no tenderness, no bony tenderness, normal two-point discrimination, normal capillary refill, no deformity, no laceration and no swelling. Normal sensation noted. Normal strength noted. Left hand: She exhibits normal range of motion, no tenderness, no bony tenderness, normal two-point discrimination, normal capillary refill, no deformity, no laceration and no swelling. Normal sensation noted. Normal strength noted. Skin:     General: Skin is warm. Findings: No rash. Neurological:      General: No focal deficit present. Mental Status: She is alert and oriented to person, place, and time. Mental status is at baseline. Cranial Nerves: No cranial nerve deficit or facial asymmetry. Sensory: No sensory deficit. Motor: No weakness, tremor or abnormal muscle tone. Coordination: Coordination is intact. Gait: Gait abnormal.      Comments: +5/5 strength BUE          Right Hand Exam     Tenderness   The patient is experiencing tenderness in the palmar area.     Range of Motion   Wrist   Extension: abnormal   Flexion: abnormal   Pronation: normal   Supination: normal     Muscle Strength   The patient has normal right wrist above    - Garrett Yi MD, Neurology, [place]  - CK; Future    7. Myalgia  See above    - CK; Future    8. Morbid obesity due to excess calories ([address]  I stressed with patient the importance of long-term weight loss to help manage her chronic pain issues. I offered her referral to bariatric surgeon for further evaluation and potential further management surgically. Patient declines this referral today in the office. Patient counseled on healthy dietary choices and the benefits of a lower salt and/or lower carbohydrate diet as appropriate. Patient also counseled on benefits of moderate intensity cardiovascular exercise for 20-30 minutes at least 3-5 days a week. Advice was given to make small changes over time, setting smaller achievable goals until recommended lifestyle changes are reached. 9. Moderate persistent asthma without complication    - albuterol (PROVENTIL) (2.5 MG/3ML) 0.083% nebulizer solution; Take 3 mLs by nebulization every 6 hours as needed for Wheezing or Shortness of Breath  Dispense: 120 each;  Refill: 1      Modified Medications    Modified Medication Previous Medication    ALBUTEROL (PROVENTIL) (2.5 MG/3ML) 0.083% NEBULIZER SOLUTION albuterol (PROVENTIL) (2.5 MG/3ML) 0.083% nebulizer solution       Take 3 mLs by nebulization every 6 hours as needed for Wheezing or Shortness of Breath    Inhale 2.5 mg into the lungs          New Prescriptions    No medications on file          Medications Discontinued During This Encounter   Medication Reason    Cholecalciferol 50 MCG (9515 UT) CAPS DUPLICATE    hydrOXYzine (ATARAX) 50 MG tablet DUPLICATE    meloxicam (MOBIC) 15 MG tablet DISCONTINUED BY ANOTHER CLINICIAN    ibuprofen (ADVIL;MOTRIN) 800 MG tablet DISCONTINUED BY ANOTHER CLINICIAN    albuterol (PROVENTIL) (2.5 MG/3ML) 0.083% nebulizer solution REORDER    VRAYLAR 1.5 MG capsule DISCONTINUED BY ANOTHER CLINICIAN    QUEtiapine (SEROQUEL) 200 MG tablet DISCONTINUED BY ANOTHER CLINICIAN    pregabalin (LYRICA) 25 MG capsule DISCONTINUED BY ANOTHER CLINICIAN    meloxicam (MOBIC) 15 MG tablet DISCONTINUED BY ANOTHER CLINICIAN       Return for keep next scheduled visit MAY 2020.     Kayden Ferrara MD

## 2020-03-10 ENCOUNTER — HOSPITAL ENCOUNTER (OUTPATIENT)
Dept: PHYSICAL THERAPY | Age: 45
Setting detail: THERAPIES SERIES
Discharge: HOME | End: 2020-03-10
Payer: COMMERCIAL

## 2020-03-10 PROCEDURE — 97530 THERAPEUTIC ACTIVITIES: CPT

## 2020-03-10 PROCEDURE — G0283 ELEC STIM OTHER THAN WOUND: HCPCS

## 2020-03-10 PROCEDURE — 97110 THERAPEUTIC EXERCISES: CPT

## 2020-03-10 ASSESSMENT — PAIN DESCRIPTION - PAIN TYPE: TYPE: CHRONIC PAIN

## 2020-03-10 ASSESSMENT — PAIN SCALES - GENERAL: PAINLEVEL_OUTOF10: 9

## 2020-03-10 ASSESSMENT — PAIN DESCRIPTION - LOCATION: LOCATION: BACK;BUTTOCKS;NECK

## 2020-03-10 NOTE — PROGRESS NOTES
LUE  Strength LUE: WNL    Exercises  Exercise 17: 3-way SLR On mat- flex, abd, ext 10x bilaterally  Exercise 18: prone ham curls x10 bilaterally  Exercise 19: LAQ x10 bilaterally     Modalities  Moist heat: 20 mins to low back in prone  E-stim (parameters): Pt. brought in TENS unit from home. Reviewed device, setup, treatment recommendations. Had pt assist with setup for use on low back. Reviewed precautions. Advised to start with 30 min wear time to ensure comfort. Utilized both channels in cross setup. Jose picture with electrode placement for use on neck as well when needed. Assessment:   Conditions Requiring Skilled Therapeutic Intervention  Body structures, Functions, Activity limitations: Decreased functional mobility ; Decreased high-level IADLs;Decreased posture;Decreased ADL status; Decreased endurance;Decreased ROM; Decreased strength; Increased pain  Assessment: Pt is a 41 yo female referred to PT with with chronic back and neck pain. She has completed 1 month of outpatient PT. She has been highly motivated and compliant with recommendations. Good return demo and recall and form with HEP both for her neck and back for stretches and strengthening. Neck pain has improved and ROM is WNL and tolerating strengthening with red theraband without diffficulty. Back pain issues persist wtih some concern for radiculopathy due to reports of sharp, burnig and shooting pain down the legs especially with flexion activities. ROM remains very limited by pain. She has obtained a rollator style walker which has helped to increase her overall activity level and ability to get out into the community and now with home TENS unit as well to help with pain management/control. Progress limited in regards to the low back pain and feel she would benefit from additinoal medical management to investigate cause of pain due to concern for radicular symptoms and limited tolerance with skilled PT.  Goals have been partially met and feel pt. is appropriate for d/c with HEP (neck and back stretches, postural strengthening and LE strengthening), TENS/heat and recommend f/u with MD for possible imaging of back. Pt voices understanding and agreement with plan. Treatment Diagnosis: Multiple site OA, chronic back and neck pain  Prognosis: Fair  Decision Making: High Complexity  Barriers to Learning: chronic pain  Treatment Initiated : conitnue with HEP; recommend pool program and pt. aware of options; contiue heat/TENS for pain control; f/u with MD as scheduled  Activity Tolerance  Activity Tolerance: Patient Tolerated treatment well      Goals:  Short term goals  Time Frame for Short term goals: 3 visits   Short term goal 1: decrease pain to 8/10-partially met - for neck, not back consistently  Short term goal 2: return demo HEP for general stretching for neck, back,extremities with >75% accuracy-MET  Short term goal 3: Pt. to obtain heavy duty rollator style walker to assist with ability to walk further distances with improved tolerance-MET  Long term goals  Time Frame for Long term goals : 6-8 sessions  Long term goal 1: Decrease avg pain to 6/10 wtih max pain no higher than 8/10-partially met for neck, not back  Long term goal 2: independent with HEP and community program for flexibility and core strength for longterm symptom management of chronic pain conditions-MET  Long term goal 3: Pt. report ability to ambulate wtih walker for >20 mins. total to demonstrate increased activity tolerance and promote more healthy lifestyle-partially met, with breaks  Long term goal 4: Pt. demo ability to perform full cervical and lumbar ROM without increased pain -partially met for neck, not back  Patient Goals   Patient goals : Get pain to below a 7/10    Plan d/c outpatient PT.  Continue with HEP and f/u with MD       Therapy Time   Individual Concurrent Group Co-treatment   Time In 0800         Time Out 0900         Minutes [address] Melania Hammond, PT

## 2020-03-17 ENCOUNTER — TELEPHONE (OUTPATIENT)
Dept: FAMILY MEDICINE CLINIC | Age: 45
End: 2020-03-17

## 2020-03-17 ENCOUNTER — HOSPITAL ENCOUNTER (OUTPATIENT)
Dept: PHYSICAL THERAPY | Age: 45
Setting detail: THERAPIES SERIES
Discharge: HOME | End: 2020-03-17
Payer: COMMERCIAL

## 2020-03-17 PROCEDURE — 97530 THERAPEUTIC ACTIVITIES: CPT

## 2020-03-17 PROCEDURE — 97162 PT EVAL MOD COMPLEX 30 MIN: CPT

## 2020-03-17 PROCEDURE — 97110 THERAPEUTIC EXERCISES: CPT

## 2020-03-17 ASSESSMENT — PAIN DESCRIPTION - PAIN TYPE: TYPE: CHRONIC PAIN

## 2020-03-17 ASSESSMENT — PAIN DESCRIPTION - ORIENTATION: ORIENTATION: LEFT;RIGHT

## 2020-03-17 ASSESSMENT — PAIN DESCRIPTION - DESCRIPTORS: DESCRIPTORS: SHARP;BURNING

## 2020-03-17 ASSESSMENT — PAIN SCALES - GENERAL: PAINLEVEL_OUTOF10: 5

## 2020-03-17 ASSESSMENT — PAIN DESCRIPTION - LOCATION: LOCATION: HAND

## 2020-03-17 ASSESSMENT — PAIN DESCRIPTION - FREQUENCY: FREQUENCY: CONTINUOUS

## 2020-03-17 NOTE — PROGRESS NOTES
Physical Therapy  Initial Assessment  Date: 3/17/2020  Patient Name: Jono Riggs  MRN: [id number]  : 1975     Treatment Diagnosis: Bilat wrist pain, numbness and tingling Bilat hands    Subjective   General  Chart Reviewed: Yes  Additional Pertinent Hx: chronic neck and back pain,   Family / Caregiver Present: No  Referring Practitioner: Dr. Glenis Bradley  Referral Date : 20  Diagnosis: numbness tingling both hands, bilat wrist pain  General Comment  Comments: Pt reports increasing neck pain and stiffness over the last few months, XRAYs , OA cerv spine per pt, EMG scheduled tomorrow  PT Visit Information  Total # of Visits Approved: (4-10)  Total # of Visits to Date: 1  Plan of Care/Certification Expiration Date: 20  No Show: 0  Canceled Appointment: 0  Subjective  Subjective: Pt reports tingling and numbness bilat hands for the last couple of years which has progressively worsened in the last few months. Pt reports numbness and \"pins and needles\" bilat hands upon waking . lPt reports dropping things throughout the day, difficulty grasping onto objects and holding them, worse in the mornings.    Pain Screening  Patient Currently in Pain: Yes(Intermittent pain bilat hands, wrists, forearms 9/10 in the mornings, 8/10 during the day with use of hands, 5/10 at rest)  Pain Assessment  Pain Level: 5  Pain Type: Chronic pain  Pain Location: Hand(wrist, hand forearm (dorsal in the am, volar during the day))  Pain Orientation: Left;Right  Pain Descriptors: Sharp;Burning  Pain Frequency: Continuous  Vital Signs  Patient Currently in Pain: Yes(Intermittent pain bilat hands, wrists, forearms 9/10 in the mornings, 8/10 during the day with use of hands, 5/10 at rest)    Objective   AROM RUE (degrees)  R Elbow Extension 145-0: 0  R Forearm Supination  0-90: 70 deg sup  R Wrist Flexion 0-80: 60 deg  R Wrist Extension 0-70: 65 deg  R Wrist Radial Deviation 0-20: 20 deg  R Wrist Ulnar Deviation 0-45: 45 deg  AROM LUE on joint protection and strongly recommended refraining from activities involving repetetive use of bilat wrists, and encouraged frequent breaks between activities. REQUIRES PT FOLLOW UP: Yes       Plan   Plan  Times per week: 1-2  Plan weeks: (4-5)  Current Treatment Recommendations: Strengthening, ROM, Functional Mobility Training, Manual Therapy - Soft Tissue Mobilization, Pain Management, Home Exercise Program, Modalities  Plan  Times per week: 1-2  Plan weeks: (4-5)  Current Treatment Recommendations: Strengthening, ROM, Functional Mobility Training, Manual Therapy - Soft Tissue Mobilization, Pain Management, Home Exercise Program, Modalities  Goals  Short term goals  Time Frame for Short term goals: 2 weeks  Short term goal 1: I with HEP  Short term goal 2: Decrease tingling and numbness 20% or greater   Long term goals  Time Frame for Long term goals : 4-5 weeks  Long term goal 1: Improve Bilat wrist AROM WFL all planes without increased pain  Long term goal 2: Improve strength bilat wrist, foream 4/5 or greater   Long term goal 3: Improve bilat  strength 40 lb or greater to improve grasping, carrying abilitlies  Long term goal 4: Improve QuickDash 30% or less  Long term goal 5: Decrease complaints of tingling and numbness bilat hands 50% or greater  Patient Goals   Patient goals :  \"Figure out whats causing the problem and fix it\", decrease pain, tingling and numbness     Therapy Time   Individual Concurrent Group Co-treatment   Time In  1200         Time Out  1305         Minutes  [facility], [address] [facility], Sharonda Cantu

## 2020-03-17 NOTE — TELEPHONE ENCOUNTER
1st attempt to reach patient. Called patient @ [phone] (M) and left message on machine for patient to return call during normal business hours of 8:30 AM and 5 PM @ [phone] option 2.

## 2020-03-17 NOTE — TELEPHONE ENCOUNTER
Patient states she would like a script for extra large wrist brace for carpal tunnel for both hands. She would like the script sent to [facility].

## 2020-03-17 NOTE — TELEPHONE ENCOUNTER
What is the brace for? Carpal tunnel? She can buy a cock-up splint OTC at most pharmacies and can find the size she needs. If she needs a prescription I need to know her size.

## 2020-03-18 ENCOUNTER — TELEPHONE (OUTPATIENT)
Dept: FAMILY MEDICINE CLINIC | Age: 45
End: 2020-03-18

## 2020-03-26 RX ORDER — ERGOCALCIFEROL 1.25 MG/1
CAPSULE ORAL
Qty: 4 CAPSULE | Refills: 2 | Status: SHIPPED | OUTPATIENT
Start: 2020-03-26 | End: 2020-06-22 | Stop reason: ALTCHOICE

## 2020-03-31 ENCOUNTER — APPOINTMENT (OUTPATIENT)
Dept: PHYSICAL THERAPY | Age: 45
End: 2020-03-31
Payer: COMMERCIAL

## 2020-04-20 ENCOUNTER — OFFICE VISIT (OUTPATIENT)
Dept: NEUROLOGY | Age: 45
End: 2020-04-20
Payer: COMMERCIAL

## 2020-04-20 VITALS — WEIGHT: 293 LBS | SYSTOLIC BLOOD PRESSURE: 149 MMHG | DIASTOLIC BLOOD PRESSURE: 80 MMHG | BODY MASS INDEX: 65.09 KG/M2

## 2020-04-20 PROBLEM — G56.03 BILATERAL CARPAL TUNNEL SYNDROME: Status: ACTIVE | Noted: 2020-04-20

## 2020-04-20 PROCEDURE — 1036F TOBACCO NON-USER: CPT | Performed by: PSYCHIATRY & NEUROLOGY

## 2020-04-20 PROCEDURE — G8427 DOCREV CUR MEDS BY ELIG CLIN: HCPCS | Performed by: PSYCHIATRY & NEUROLOGY

## 2020-04-20 PROCEDURE — 99204 OFFICE O/P NEW MOD 45 MIN: CPT | Performed by: PSYCHIATRY & NEUROLOGY

## 2020-04-20 PROCEDURE — G8417 CALC BMI ABV UP PARAM F/U: HCPCS | Performed by: PSYCHIATRY & NEUROLOGY

## 2020-04-20 RX ORDER — MELOXICAM 7.5 MG/1
TABLET ORAL
COMMUNITY
Start: 2020-04-02

## 2020-04-20 RX ORDER — TRIFLUOPERAZINE HYDROCHLORIDE 5 MG/1
TABLET, FILM COATED ORAL
COMMUNITY
Start: 2020-04-14 | End: 2020-04-27 | Stop reason: ALTCHOICE

## 2020-04-20 RX ORDER — CALCIUM CARBONATE 600 MG
TABLET ORAL
COMMUNITY
Start: 2020-03-27

## 2020-04-20 RX ORDER — OXCARBAZEPINE 600 MG/1
TABLET, FILM COATED ORAL
COMMUNITY
Start: 2020-04-14 | End: 2020-06-22 | Stop reason: SDUPTHER

## 2020-04-20 RX ORDER — LORAZEPAM 2 MG
TABLET ORAL
COMMUNITY
Start: 2020-03-29 | End: 2020-04-27 | Stop reason: SDUPTHER

## 2020-04-20 ASSESSMENT — ENCOUNTER SYMPTOMS
SHORTNESS OF BREATH: 0
TROUBLE SWALLOWING: 0
NAUSEA: 0
VOMITING: 0
BACK PAIN: 1
CHOKING: 0
PHOTOPHOBIA: 0

## 2020-04-20 NOTE — PROGRESS NOTES
Subjective:      Patient ID: Brendan Diop is a 40 y.o. female who presents today for:  Chief Complaint   Patient presents with    New Patient     Numbness and tingling from about 2 inches about the wrist to her finger tips. She states that it is mostly when she wakes in the morning and sometimes throughout the day. She states that PT told her exsercises and sometimes they hurt pretty bad. HPI 66-year-old right-handed female is here for evaluation of numbness of her hands. Patient reports her symptoms going on for couple of years. She has other medical issues including significant back pain she is followed by Dr. Lillie Hannon she she has claudication when she walks. She is using a walker. Patient reports numbness upon awakening in the morning she stays awake at night she is using braces this are not helping. She also has neck pain with this. She has issues with  strength. She is not any falls injuries or trauma. Patient reports no bowel or bladder dysfunction. She has not any color changes or coldness of her extremities. Patient denies any recent trauma.   She has not had  a rash or joint swelling and  she does have hypothyroidism which is being managed    Past Medical History:   Diagnosis Date    Anxiety     Asthma     History of substance abuse ([facility] [address] 10/10/2019    Perennial allergic rhinitis 10/10/2019    Primary osteoarthritis involving multiple joints 10/10/2019    Rupture of Achilles tendon 11/29/2018    Rupture of gastrocnemius muscle 11/29/2018     Past Surgical History:   Procedure Laterality Date    TUBAL LIGATION  2003     Social History     Socioeconomic History    Marital status: Single     Spouse name: Not on file    Number of children: Not on file    Years of education: Not on file    Highest education level: Not on file   Occupational History    Occupation: on disability due to knee and back pain   Social Needs    Financial resource strain: Not on file   [facility] insecurity     Worry: Not on file     Inability: Not on file    Transportation needs     Medical: Not on file     Non-medical: Not on file   Tobacco Use    Smoking status: Former Smoker     Packs/day: 2.00     Years: 27.00     Pack years: 54.00     Types: Cigarettes     Start date:      Last attempt to quit: 2009     Years since quittin.3    Smokeless tobacco: Never Used   Substance and Sexual Activity    Alcohol use: Yes     Frequency: Monthly or less     Drinks per session: 1 or 2     Binge frequency: Never    Drug use: Never     Comment: hx marijuana, cocaine, PCP, methanphetamine use in the past, last time was 11 years ago    Sexual activity: Not Currently     Partners: Male     Comment: no history of STI   Lifestyle    Physical activity     Days per week: Not on file     Minutes per session: Not on file    Stress: Not on file   Relationships    Social connections     Talks on phone: Not on file     Gets together: Not on file     Attends [employer] service: Not on file     Active member of club or organization: Not on file     Attends meetings of clubs or organizations: Not on file     Relationship status: Not on file    Intimate partner violence     Fear of current or ex partner: Not on file     Emotionally abused: Not on file     Physically abused: Not on file     Forced sexual activity: Not on file   Other Topics Concern    Not on file   Social History Narrative    Lives with one daughter         2 dogs        Hobbies: reading, music     Family History   Problem Relation Age of Onset    High Blood Pressure Mother     High Cholesterol Mother     Diabetes Mother     Kidney Disease Mother     High Blood Pressure Father     High Cholesterol Father     COPD Father     Heart Failure Father     Breast Cancer Sister 40    Breast Cancer Maternal Grandmother 59    No Known Problems Maternal Grandfather     No Known Problems Paternal Grandmother     No Known Problems Paternal Grandfather Coordination: Coordination normal.      Deep Tendon Reflexes: Reflexes are normal and symmetric. Babinski sign absent on the right side. Babinski sign absent on the left side. Patient is moderately obese. She has decreased pinprick in the median nerve distribution right more than left. In the lower extremity she has no reflexes this could be secondary to her habitus that she has large knees as well. She walks with a walker. No results found. Lab Results   Component Value Date    WBC 10.3 01/21/2020    RBC 4.65 01/21/2020    HGB 12.2 01/21/2020    HCT 37.7 01/21/2020    MCV 81.1 01/21/2020    MCH 26.2 01/21/2020    MCHC 32.3 01/21/2020    RDW 14.4 01/21/2020     01/21/2020     Lab Results   Component Value Date     03/09/2020    K 4.9 03/09/2020     03/09/2020    CO2 26 03/09/2020    BUN 9 03/09/2020    CREATININE 0.68 03/09/2020    GFRAA >60.0 03/09/2020    LABGLOM >60.0 03/09/2020    GLUCOSE 91 03/09/2020    PROT 7.8 01/21/2020    LABALBU 4.1 01/21/2020    CALCIUM 9.2 03/09/2020    BILITOT 0.3 01/21/2020    ALKPHOS 108 01/21/2020    AST 16 01/21/2020    ALT 17 01/21/2020     No results found for: PROTIME, INR  Lab Results   Component Value Date    TSH 4.040 12/12/2019    FERRITIN 52.7 12/12/2019    IRON 35 12/12/2019    TIBC 310 12/12/2019     Lab Results   Component Value Date    TRIG 129 12/12/2019    HDL 45 12/12/2019    LDLCALC 87 12/12/2019     No results found for: Angelique Ordaz, LABBENZ, CANNAB, COCAINESCRN, LABMETH, OPIATESCREENURINE, PHENCYCLIDINESCREENURINE, PPXUR, ETOH  No results found for: LITHIUM, DILFRTOT, VALPROATE    Assessment:       Diagnosis Orders   1. Bilateral carpal tunnel syndrome  EMG   2. Chronic bilateral low back pain with bilateral sciatica     3. Migraine without aura and without status migrainosus, not intractable     Bilateral carpal tunnel syndrome or a cervical etiology.   In the first instance we will arrange for an EMG nerve conduction study to further guide us in her management with that she has a dual pathology or just carpal tunnel syndrome. We have not intervened with her back issues as she is followed by Dr. Bertt Quintana. We may pursue this further later once we have her upper extremities sorted out. Patient does have hypothyroidism which could be contributing to some of her issues. Image studies were reviewed and she is only had x-rays of her back. Patient does complain of neck pain as well. Patient is not myelopathic though from the EMG we may be able to sort out if she does have a cervical spinal pathology. We will continue to keep observation and urgently obtain the EMG as she is suffering and cannot sleep at all. Plan:      Orders Placed This Encounter   Procedures    EMG     Standing Status:   Future     Standing Expiration Date:   10/20/2020     Order Specific Question:   Which body part? Answer:   uppers ,  for wednesday 29th April CTS     No orders of the defined types were placed in this encounter. Return in about 3 months (around 7/20/2020).       Clearance MD Gaye

## 2020-04-27 ENCOUNTER — TELEMEDICINE (OUTPATIENT)
Dept: FAMILY MEDICINE CLINIC | Age: 45
End: 2020-04-27
Payer: COMMERCIAL

## 2020-04-27 PROCEDURE — 99214 OFFICE O/P EST MOD 30 MIN: CPT | Performed by: FAMILY MEDICINE

## 2020-04-27 RX ORDER — SERTRALINE HYDROCHLORIDE 100 MG/1
150 TABLET, FILM COATED ORAL DAILY
COMMUNITY

## 2020-04-27 RX ORDER — IPRATROPIUM BROMIDE 42 UG/1
2 SPRAY, METERED NASAL 3 TIMES DAILY
Qty: 15 ML | Refills: 0 | Status: SHIPPED | OUTPATIENT
Start: 2020-04-27 | End: 2020-05-04

## 2020-04-27 RX ORDER — OXCARBAZEPINE 600 MG/1
600 TABLET, FILM COATED ORAL 2 TIMES DAILY
COMMUNITY

## 2020-04-27 ASSESSMENT — ENCOUNTER SYMPTOMS
SHORTNESS OF BREATH: 0
BLOOD IN STOOL: 0
ABDOMINAL PAIN: 0
CHEST TIGHTNESS: 0
VOMITING: 0
BACK PAIN: 1
ANAL BLEEDING: 0
CONSTIPATION: 0
NAUSEA: 0
DIARRHEA: 0
COUGH: 0
WHEEZING: 0

## 2020-04-27 ASSESSMENT — PATIENT HEALTH QUESTIONNAIRE - PHQ9
SUM OF ALL RESPONSES TO PHQ QUESTIONS 1-9: 1
2. FEELING DOWN, DEPRESSED OR HOPELESS: 1
1. LITTLE INTEREST OR PLEASURE IN DOING THINGS: 0
SUM OF ALL RESPONSES TO PHQ QUESTIONS 1-9: 1
SUM OF ALL RESPONSES TO PHQ9 QUESTIONS 1 & 2: 1

## 2020-04-27 NOTE — PROGRESS NOTES
fever/chills/sweats. I will have office staff help her schedule a visit with the neurosurgeon ASAP. 2. Urinary incontinence, unspecified type  See above. Symptoms are intermittent and noted only when laying down in bed. 3. Primary osteoarthritis involving multiple joints  Managed with use of medication per pain management office. 4. Morbid obesity due to excess calories [facility])  Patient counseled on healthy dietary choices and the benefits of a lower salt and/or lower carbohydrate diet as appropriate. Patient also counseled on benefits of moderate intensity cardiovascular exercise as able for 20-30 minutes at least 3-5 days a week. Advice was given to make small changes over time, setting smaller achievable goals until recommended lifestyle changes are reached. 5. Anxiety and depression  Patient established with [facility] for long-term management of mood and anxiety. Stable currently on her medication after adjustments made by the specialist.  No SI/HI or self harming behaviors. We will continue to follow peripherally over time. Return for keep next scheduled visit May 2020. Jyoti Watson is a 40 y.o. female being evaluated by a Virtual Visit (video visit) encounter to address concerns as mentioned above. A caregiver was present when appropriate. Due to this being a TeleHealth encounter (During MPVOG-02 public health emergency), evaluation of the following organ systems was limited: Vitals/Constitutional/EENT/Resp/CV/GI//MS/Neuro/Skin/Heme-Lymph-Imm. Pursuant to the emergency declaration under the [facility], [address] [place] [address] authority and the [employer] Act, this Virtual Visit was conducted with patient's (and/or legal guardian's) consent, to reduce the patient's risk of exposure to COVID-19 and provide necessary medical care.   The patient (and/or legal guardian) has also been advised to contact this office for worsening conditions or problems, and seek emergency medical treatment and/or call 911 if deemed necessary. Patient identification was verified at the start of the visit: Yes    Total time spent on this encounter: Not billed by time    Services were provided through a video synchronous discussion virtually to substitute for in-person clinic visit. Patient and provider were located at their individual homes. --Kristy Santiago MD on 4/27/2020 at 10:12 AM    An electronic signature was used to authenticate this note.

## 2020-04-29 ENCOUNTER — HOSPITAL ENCOUNTER (OUTPATIENT)
Dept: NEUROLOGY | Age: 45
Discharge: HOME | End: 2020-04-29
Payer: COMMERCIAL

## 2020-04-29 PROCEDURE — 95911 NRV CNDJ TEST 9-10 STUDIES: CPT

## 2020-04-29 PROCEDURE — 95886 MUSC TEST DONE W/N TEST COMP: CPT | Performed by: PSYCHIATRY & NEUROLOGY

## 2020-04-29 PROCEDURE — 95886 MUSC TEST DONE W/N TEST COMP: CPT

## 2020-04-29 PROCEDURE — 95913 NRV CNDJ TEST 13/> STUDIES: CPT | Performed by: PSYCHIATRY & NEUROLOGY

## 2020-04-29 NOTE — PROCEDURES
amplitudes, and normal conduction velocity. The left  ulnar mixed orthodromic study shows normal amplitudes, latency, and  conduction velocity. Radial sensory nerve conduction studies are  normal.    Needle electrode examination of the above-sampled muscles does not  reveal anything significant. F-wave responses are normal.    IMPRESSION:  1. Mild-to-moderate right median nerve neuropathy at the wrist  suggesting carpal tunnel syndrome. These findings are based on  prolonged latencies seen both in the antidromic and orthodromic sensory  nerve recordings. 2.  Mild left median nerve neuropathy at the wrist suggesting carpal  tunnel syndrome. There is no suggestion of any active or chronic radiculopathies. Multiple sensory nerve conduction studies are evaluated to rule out  artifact or temperature differences. The patient will be referred to a hand surgeon for initial conservative  therapy, and if no response, carpal tunnel release may be a  consideration on the right. Another followup EMG and nerve conduction  studies are recommended in six months given that her findings do not  quite explain all the EMG findings either. This test is personally performed and interpreted by me. Thank you   for asking us to assist in the care of this patient. MD Garrett Quezada MD, [address], American Board of Psychiatry & Neurology  Board Certified in Vascular Neurology  Board Certified in Neuromuscular Medicine  Certified in . [address]   \    D: 04/29/2020 11:04:35       T: 04/29/2020 12:04:37     [email or web address]  Job#: [id number]     Doc#: [id number]    CC:

## 2020-05-17 ENCOUNTER — PATIENT MESSAGE (OUTPATIENT)
Dept: NEUROLOGY | Age: 45
End: 2020-05-17

## 2020-05-18 NOTE — TELEPHONE ENCOUNTER
From: George Hung  To: Stas Cervantes MD  Sent: 5/17/2020 9:41 AM EDT  Subject: Visit Follow-Up Question    I have a question about EMG resulted on 5/1/20, 8:23 AM.    It's doesn't show what the results from the test were

## 2020-05-25 RX ORDER — LEVOTHYROXINE SODIUM 0.07 MG/1
75 TABLET ORAL DAILY
Qty: 90 TABLET | Refills: 1 | Status: SHIPPED | OUTPATIENT
Start: 2020-05-25 | End: 2020-06-22 | Stop reason: SDUPTHER

## 2020-06-01 ENCOUNTER — VIRTUAL VISIT (OUTPATIENT)
Dept: FAMILY MEDICINE CLINIC | Age: 45
End: 2020-06-01
Payer: COMMERCIAL

## 2020-06-01 PROCEDURE — 99214 OFFICE O/P EST MOD 30 MIN: CPT | Performed by: FAMILY MEDICINE

## 2020-06-01 RX ORDER — ALBUTEROL SULFATE 2.5 MG/3ML
2.5 SOLUTION RESPIRATORY (INHALATION) EVERY 6 HOURS PRN
Qty: 120 EACH | Refills: 1 | Status: SHIPPED | OUTPATIENT
Start: 2020-06-01 | End: 2020-06-22 | Stop reason: SDUPTHER

## 2020-06-01 RX ORDER — PALIPERIDONE 3 MG/1
3 TABLET, EXTENDED RELEASE ORAL NIGHTLY
COMMUNITY
Start: 2020-05-13

## 2020-06-01 RX ORDER — NYSTATIN 100000 [USP'U]/G
POWDER TOPICAL
Qty: 60 G | Refills: 2 | Status: SHIPPED | OUTPATIENT
Start: 2020-06-01 | End: 2020-06-22 | Stop reason: SDUPTHER

## 2020-06-01 RX ORDER — MONTELUKAST SODIUM 10 MG/1
10 TABLET ORAL NIGHTLY
Qty: 90 TABLET | Refills: 1 | Status: SHIPPED | OUTPATIENT
Start: 2020-06-01 | End: 2020-06-22 | Stop reason: SDUPTHER

## 2020-06-01 RX ORDER — CETIRIZINE HYDROCHLORIDE 10 MG/1
10 TABLET ORAL DAILY
Qty: 90 TABLET | Refills: 1 | Status: SHIPPED | OUTPATIENT
Start: 2020-06-01 | End: 2020-06-22 | Stop reason: SDUPTHER

## 2020-06-01 RX ORDER — ALBUTEROL SULFATE 90 UG/1
2 AEROSOL, METERED RESPIRATORY (INHALATION) EVERY 6 HOURS PRN
Qty: 18 G | Refills: 1 | Status: SHIPPED | OUTPATIENT
Start: 2020-06-01 | End: 2020-06-22 | Stop reason: SDUPTHER

## 2020-06-01 RX ORDER — SULFAMETHOXAZOLE AND TRIMETHOPRIM 800; 160 MG/1; MG/1
1 TABLET ORAL 2 TIMES DAILY
Qty: 20 TABLET | Refills: 0 | Status: SHIPPED | OUTPATIENT
Start: 2020-06-01 | End: 2020-06-11

## 2020-06-01 RX ORDER — NYSTATIN 100000 U/G
CREAM TOPICAL
Qty: 30 G | Refills: 2 | Status: SHIPPED | OUTPATIENT
Start: 2020-06-01 | End: 2020-06-22 | Stop reason: SDUPTHER

## 2020-06-01 ASSESSMENT — ENCOUNTER SYMPTOMS
ABDOMINAL PAIN: 0
BLOOD IN STOOL: 0
SHORTNESS OF BREATH: 0
WHEEZING: 0
VOMITING: 0
CHEST TIGHTNESS: 0
DIARRHEA: 0
NAUSEA: 0
ANAL BLEEDING: 0
CONSTIPATION: 0
COUGH: 0

## 2020-06-01 NOTE — PROGRESS NOTES
Topics    Alcohol use: Yes     Frequency: Monthly or less     Drinks per session: 1 or 2     Binge frequency: Never    Drug use: Never     Comment: hx marijuana, cocaine, PCP, methanphetamine use in the past, last time was 11 years ago            PHYSICAL EXAMINATION:  [ INSTRUCTIONS:  \"[x]\" Indicates a positive item  \"[]\" Indicates a negative item  -- DELETE ALL ITEMS NOT EXAMINED]  Vital Signs: (As obtained by patient/caregiver or practitioner observation)    Blood pressure-  Heart rate-    Respiratory rate-    Temperature-  Pulse oximetry-     Constitutional: [x] Appears well-developed and well-nourished [x] No apparent distress      [] Abnormal-   Mental status  [x] Alert and awake  [x] Oriented to person/place/time [x]Able to follow commands      Eyes:  EOM    [x]  Normal  [] Abnormal-  Sclera  [x]  Normal  [] Abnormal -         Discharge [x]  None visible  [] Abnormal -    HENT:   [x] Normocephalic, atraumatic. [] Abnormal   [x] Mouth/Throat: Mucous membranes are moist.     External Ears [x] Normal  [] Abnormal-     Neck: [x] No visualized mass     Pulmonary/Chest: [x] Respiratory effort normal.  [x] No visualized signs of difficulty breathing or respiratory distress        [] Abnormal-      Musculoskeletal:   [] Normal gait with no signs of ataxia         [] Normal range of motion of neck        [] Abnormal-       Neurological:        [x] No Facial Asymmetry (Cranial nerve 7 motor function) (limited exam to video visit)          [] No gaze palsy        [] Abnormal-         Skin:        [] No significant exanthematous lesions or discoloration noted on facial skin         [x] Abnormal-  Brightly erythematous macular rash with satellite lesions noted over posterior knee. There is an area of maceration with ~1-2 cm area of open skin. No weeping/bleeding areas noted.  Also ingrowing right great toenail noted at medial edge of nail with localized swelling and mild erythema and visible drainage at the medial edge

## 2020-06-22 ENCOUNTER — VIRTUAL VISIT (OUTPATIENT)
Dept: FAMILY MEDICINE CLINIC | Age: 45
End: 2020-06-22
Payer: COMMERCIAL

## 2020-06-22 PROCEDURE — 99214 OFFICE O/P EST MOD 30 MIN: CPT | Performed by: FAMILY MEDICINE

## 2020-06-22 RX ORDER — IPRATROPIUM BROMIDE 42 UG/1
2 SPRAY, METERED NASAL 3 TIMES DAILY
Qty: 1 BOTTLE | Refills: 1 | Status: SHIPPED | OUTPATIENT
Start: 2020-06-22 | End: 2020-10-28

## 2020-06-22 RX ORDER — ERGOCALCIFEROL 1.25 MG/1
CAPSULE ORAL
Qty: 4 CAPSULE | Refills: 2 | Status: CANCELLED | OUTPATIENT
Start: 2020-06-22

## 2020-06-22 RX ORDER — CETIRIZINE HYDROCHLORIDE 10 MG/1
10 TABLET ORAL DAILY
Qty: 90 TABLET | Refills: 1 | Status: SHIPPED | OUTPATIENT
Start: 2020-06-22

## 2020-06-22 RX ORDER — ALBUTEROL SULFATE 90 UG/1
2 AEROSOL, METERED RESPIRATORY (INHALATION) EVERY 6 HOURS PRN
Qty: 18 G | Refills: 1 | Status: SHIPPED | OUTPATIENT
Start: 2020-06-22 | End: 2020-08-22

## 2020-06-22 RX ORDER — NYSTATIN 100000 U/G
CREAM TOPICAL
Qty: 30 G | Refills: 1 | Status: SHIPPED | OUTPATIENT
Start: 2020-06-22

## 2020-06-22 RX ORDER — LEVOTHYROXINE SODIUM 0.07 MG/1
75 TABLET ORAL DAILY
Qty: 90 TABLET | Refills: 1 | Status: SHIPPED | OUTPATIENT
Start: 2020-06-22

## 2020-06-22 RX ORDER — ALBUTEROL SULFATE 2.5 MG/3ML
2.5 SOLUTION RESPIRATORY (INHALATION) EVERY 6 HOURS PRN
Qty: 120 EACH | Refills: 1 | Status: SHIPPED | OUTPATIENT
Start: 2020-06-22 | End: 2021-01-28

## 2020-06-22 RX ORDER — FLUTICASONE PROPIONATE 110 UG/1
2 AEROSOL, METERED RESPIRATORY (INHALATION) 2 TIMES DAILY
Qty: 3 INHALER | Refills: 1 | Status: SHIPPED | OUTPATIENT
Start: 2020-06-22

## 2020-06-22 RX ORDER — ACETAMINOPHEN 160 MG
1 TABLET,DISINTEGRATING ORAL DAILY
Qty: 90 CAPSULE | Refills: 1 | Status: SHIPPED | OUTPATIENT
Start: 2020-06-22

## 2020-06-22 RX ORDER — MONTELUKAST SODIUM 10 MG/1
10 TABLET ORAL NIGHTLY
Qty: 90 TABLET | Refills: 1 | Status: SHIPPED | OUTPATIENT
Start: 2020-06-22

## 2020-06-22 RX ORDER — NYSTATIN 100000 [USP'U]/G
POWDER TOPICAL
Qty: 60 G | Refills: 1 | Status: SHIPPED | OUTPATIENT
Start: 2020-06-22

## 2020-06-22 ASSESSMENT — ENCOUNTER SYMPTOMS
BACK PAIN: 1
CONSTIPATION: 0
ANAL BLEEDING: 0
ABDOMINAL PAIN: 0
NAUSEA: 0
SHORTNESS OF BREATH: 0
CHEST TIGHTNESS: 0
DIARRHEA: 0
BLOOD IN STOOL: 0
VOMITING: 0
COUGH: 1
WHEEZING: 1

## 2020-06-22 ASSESSMENT — PATIENT HEALTH QUESTIONNAIRE - PHQ9
SUM OF ALL RESPONSES TO PHQ QUESTIONS 1-9: 1
2. FEELING DOWN, DEPRESSED OR HOPELESS: 1
SUM OF ALL RESPONSES TO PHQ9 QUESTIONS 1 & 2: 1
1. LITTLE INTEREST OR PLEASURE IN DOING THINGS: 0
SUM OF ALL RESPONSES TO PHQ QUESTIONS 1-9: 1

## 2020-06-22 NOTE — PROGRESS NOTES
2020    TELEHEALTH EVALUATION -- Audio/Visual (During - public health emergency)    HPI:    Dayanna Shah (:  1975) has requested an audio/video evaluation for the following concern(s):    Patient presents for virtual telephone visit for chronic asthma, seasonal allergies, hypothyroid, depression/anxiety follow-up visit. Patient reports taking medications as prescribed since the most recent visit. They deny adhering to any specific lower carbohydrate or lower salt diet. They deny any routine exercise outside of normal day-to-day activity. Patient reports intermittent episodes of dyspnea with activity, wheezing, and persistent cough. She admits to being more sensitive to warmer weather and increased humidity. She also admits to not using her Flovent twice daily every day as prescribed. She denies any dyspnea at rest, chest tightness, or limitation in normal day-to-day activity due to her breathing. She denies any chest pain, palpitations, lightheadedness, dizziness, worsening lower extremity edema, or syncope. Patient denies any worsening mood, SI/HI, or self harming behaviors. They deny any worsening anxiety or panic attacks. They deny any worsening fatigue, decreased appetite, or problems with sleep. Patient is currently staying in [place] and states she is considering a permanent move down [address] [facility]. She states she has less stress in [place] and feels as though she has better quality of living. Review of Systems   Constitutional: Negative for appetite change, chills, diaphoresis, fatigue, fever and unexpected weight change. Eyes: Negative for visual disturbance. Respiratory: Positive for cough (non-productive) and wheezing (intermittent, more humid air). Negative for chest tightness and shortness of breath. Cardiovascular: Negative for chest pain, palpitations and leg swelling.         No orthopnea, No PND   Gastrointestinal: Negative for abdominal pain, anal bleeding, blood in stool, constipation, diarrhea, nausea and vomiting. No heartburn, No melena   Endocrine: Negative for cold intolerance, heat intolerance, polydipsia, polyphagia and polyuria. Genitourinary: Negative for dysuria and hematuria. Musculoskeletal: Positive for arthralgias (chronic) and back pain (chronic). Negative for myalgias and neck pain. Skin: Negative for rash. Neurological: Negative for dizziness, tremors, seizures, syncope, weakness, light-headedness, numbness and headaches. Psychiatric/Behavioral: Negative for dysphoric mood, hallucinations, self-injury, sleep disturbance and suicidal ideas. The patient is not nervous/anxious. Prior to Visit Medications    Medication Sig Taking? Authorizing Provider   albuterol (PROVENTIL) (2.5 MG/3ML) 0.083% nebulizer solution Take 3 mLs by nebulization every 6 hours as needed for Wheezing or Shortness of Breath Yes Earle Cody MD   albuterol sulfate  (90 Base) MCG/ACT inhaler Inhale 2 puffs into the lungs every 6 hours as needed for Wheezing or Shortness of Breath Yes Earle Cody MD   cetirizine (ZYRTEC) 10 MG tablet Take 1 tablet by mouth daily Yes Earle Cody MD   montelukast (SINGULAIR) 10 MG tablet Take 1 tablet by mouth nightly Yes Earle Cody MD   nystatin (MYCOSTATIN) 596008 UNIT/GM cream Apply topically 2 times daily. Yes Earle Cody MD   nystatin (MYCOSTATIN) 899026 UNIT/GM powder Apply 3 times daily.  Yes Earle Cody MD   levothyroxine (SYNTHROID) 75 MCG tablet Take 1 tablet by mouth Daily Yes Earle Cody MD   ipratropium (ATROVENT) 0.06 % nasal spray 2 sprays by Nasal route 3 times daily Yes Earle Cody MD   Cholecalciferol (VITAMIN D3) 50 MCG (2000 UT) CAPS Take 1 capsule by mouth daily Yes Earle Cody MD   fluticasone (FLOVENT HFA) 110 MCG/ACT inhaler Inhale 2 puffs into the lungs 2 times daily Yes Earle Cody MD   paliperidone (INVEGA) 3 MG extended release tablet medical issues including her asthma.    - albuterol (PROVENTIL) (2.5 MG/3ML) 0.083% nebulizer solution; Take 3 mLs by nebulization every 6 hours as needed for Wheezing or Shortness of Breath  Dispense: 120 each; Refill: 1  - albuterol sulfate  (90 Base) MCG/ACT inhaler; Inhale 2 puffs into the lungs every 6 hours as needed for Wheezing or Shortness of Breath  Dispense: 18 g; Refill: 1  - montelukast (SINGULAIR) 10 MG tablet; Take 1 tablet by mouth nightly  Dispense: 90 tablet; Refill: 1  - fluticasone (FLOVENT HFA) 110 MCG/ACT inhaler; Inhale 2 puffs into the lungs 2 times daily  Dispense: 3 Inhaler; Refill: 1    2. Perennial allergic rhinitis  Managed with use of medications. Continue current management    - cetirizine (ZYRTEC) 10 MG tablet; Take 1 tablet by mouth daily  Dispense: 90 tablet; Refill: 1  - montelukast (SINGULAIR) 10 MG tablet; Take 1 tablet by mouth nightly  Dispense: 90 tablet; Refill: 1  - ipratropium (ATROVENT) 0.06 % nasal spray; 2 sprays by Nasal route 3 times daily  Dispense: 1 Bottle; Refill: 1    3. Hypothyroidism, unspecified type  Patient is overdue for follow-up TSH and free T4 levels to monitor. Continue current supplementation    - levothyroxine (SYNTHROID) 75 MCG tablet; Take 1 tablet by mouth Daily  Dispense: 90 tablet; Refill: 1  - TSH without Reflex; Future  - T4, Free; Future  - Comprehensive Metabolic Panel; Future    4. Vitamin D deficiency  Continue with current daily vitamin D supplement. Follow lab work over time    - Cholecalciferol (VITAMIN D3) 50 MCG (2000 UT) CAPS; Take 1 capsule by mouth daily  Dispense: 90 capsule; Refill: 1  - Vitamin D 25 Hydroxy; Future    5. Iron deficiency anemia, unspecified iron deficiency anemia type  Patient encouraged to continue with iron supplementation. She is due for lab work to follow her blood counts and her iron levels    - CBC Auto Differential; Future  - Iron And Tibc; Future  - Ferritin;  Future  - Comprehensive Metabolic

## 2020-09-22 ENCOUNTER — TELEPHONE (OUTPATIENT)
Dept: FAMILY MEDICINE CLINIC | Age: 45
End: 2020-09-22

## 2020-09-27 ENCOUNTER — TELEPHONE (OUTPATIENT)
Dept: FAMILY MEDICINE CLINIC | Age: 45
End: 2020-09-27

## 2020-10-28 RX ORDER — IPRATROPIUM BROMIDE 42 UG/1
2 SPRAY, METERED NASAL 3 TIMES DAILY
Qty: 1 BOTTLE | Refills: 2 | Status: SHIPPED | OUTPATIENT
Start: 2020-10-28 | End: 2021-01-26

## 2020-12-18 RX ORDER — ALBUTEROL SULFATE 90 UG/1
2 AEROSOL, METERED RESPIRATORY (INHALATION) EVERY 6 HOURS PRN
Qty: 18 G | Refills: 0 | Status: SHIPPED | OUTPATIENT
Start: 2020-12-18 | End: 2021-06-01

## 2020-12-18 NOTE — TELEPHONE ENCOUNTER
Short term refill has been sent to pharmacy. Please call patient to schedule VIRTUAL Chronic Disease Check appointment in the next month.

## 2021-01-26 DIAGNOSIS — J30.89 PERENNIAL ALLERGIC RHINITIS: ICD-10-CM

## 2021-01-26 RX ORDER — IPRATROPIUM BROMIDE 42 UG/1
2 SPRAY, METERED NASAL 3 TIMES DAILY
Qty: 1 BOTTLE | Refills: 0 | Status: SHIPPED | OUTPATIENT
Start: 2021-01-26

## 2021-01-26 NOTE — TELEPHONE ENCOUNTER
Pharmacy is requesting medication refill. Please approve or deny this request.    Rx requested:  Requested Prescriptions     Pending Prescriptions Disp Refills    ipratropium (ATROVENT) 0.06 % nasal spray [Pharmacy Med Name: IPRATROPIUM 0.06% SPRAY] 1 Bottle 2     Si sprays by Nasal route 3 times daily         Last Office Visit:   2020      Next Visit Date:  No future appointments.

## 2021-04-18 DIAGNOSIS — E03.9 HYPOTHYROIDISM, UNSPECIFIED TYPE: ICD-10-CM

## 2021-04-18 RX ORDER — LEVOTHYROXINE SODIUM 0.07 MG/1
TABLET ORAL
Qty: 90 TABLET | Refills: 1 | OUTPATIENT
Start: 2021-04-18

## 2021-04-18 NOTE — TELEPHONE ENCOUNTER
Patient is overdue for labwork and routine physical. Labwork orders have been waiting in her chart, please instruct her to go and get these fasting labs done in the next 1-2 weeks. Help her schedule routine physical in the next month.

## 2021-04-18 NOTE — TELEPHONE ENCOUNTER
PT IS RT CALL TO THE NURSE , Pharmacy is  requesting medication refill. Please approve or deny this request.    Rx requested:  Requested Prescriptions     Pending Prescriptions Disp Refills    levothyroxine (SYNTHROID) 75 MCG tablet [Pharmacy Med Name: LEVOTHYROXINE 75 MCG TABLET] 90 tablet 1     Sig: TAKE 1 TABLET BY MOUTH EVERY DAY         Last Office Visit:   9/22/2020      Next Visit Date:  No future appointments.

## 2021-06-01 DIAGNOSIS — J45.40 MODERATE PERSISTENT ASTHMA WITHOUT COMPLICATION: ICD-10-CM

## 2021-06-01 RX ORDER — ALBUTEROL SULFATE 90 UG/1
2 AEROSOL, METERED RESPIRATORY (INHALATION) EVERY 6 HOURS PRN
Qty: 17 INHALER | Refills: 0 | Status: SHIPPED | OUTPATIENT
Start: 2021-06-01

## 2021-06-01 NOTE — TELEPHONE ENCOUNTER
Pharmacy is requesting medication refill. Please approve or deny this request.    Rx requested:  Requested Prescriptions     Pending Prescriptions Disp Refills    albuterol sulfate HFA (PROAIR HFA) 108 (90 Base) MCG/ACT inhaler 17 Inhaler 1     Sig: Inhale 2 puffs into the lungs every 6 hours as needed for Wheezing         Last Office Visit:   6/22/2020      Next Visit Date:  No future appointments.

## 2023-04-19 ENCOUNTER — OFFICE VISIT (OUTPATIENT)
Dept: FAMILY MEDICINE CLINIC | Age: 48
End: 2023-04-19
Payer: MEDICAID

## 2023-04-19 VITALS
TEMPERATURE: 96.9 F | HEIGHT: 66 IN | WEIGHT: 293 LBS | SYSTOLIC BLOOD PRESSURE: 130 MMHG | DIASTOLIC BLOOD PRESSURE: 76 MMHG | BODY MASS INDEX: 47.09 KG/M2 | OXYGEN SATURATION: 95 % | HEART RATE: 83 BPM

## 2023-04-19 DIAGNOSIS — Z11.59 NEED FOR HEPATITIS C SCREENING TEST: ICD-10-CM

## 2023-04-19 DIAGNOSIS — E03.9 HYPOTHYROIDISM, UNSPECIFIED TYPE: ICD-10-CM

## 2023-04-19 DIAGNOSIS — G56.03 BILATERAL CARPAL TUNNEL SYNDROME: ICD-10-CM

## 2023-04-19 DIAGNOSIS — R21 RASH AND OTHER NONSPECIFIC SKIN ERUPTION: ICD-10-CM

## 2023-04-19 DIAGNOSIS — Z13.220 SCREENING CHOLESTEROL LEVEL: ICD-10-CM

## 2023-04-19 DIAGNOSIS — J45.40 MODERATE PERSISTENT ASTHMA WITHOUT COMPLICATION: Primary | ICD-10-CM

## 2023-04-19 DIAGNOSIS — M15.9 PRIMARY OSTEOARTHRITIS INVOLVING MULTIPLE JOINTS: ICD-10-CM

## 2023-04-19 DIAGNOSIS — J30.89 PERENNIAL ALLERGIC RHINITIS: ICD-10-CM

## 2023-04-19 DIAGNOSIS — M79.7 FIBROMYALGIA: ICD-10-CM

## 2023-04-19 DIAGNOSIS — F32.A ANXIETY AND DEPRESSION: ICD-10-CM

## 2023-04-19 DIAGNOSIS — D50.9 IRON DEFICIENCY ANEMIA, UNSPECIFIED IRON DEFICIENCY ANEMIA TYPE: ICD-10-CM

## 2023-04-19 DIAGNOSIS — Z13.1 SCREENING FOR DIABETES MELLITUS: ICD-10-CM

## 2023-04-19 DIAGNOSIS — E66.01 MORBID OBESITY DUE TO EXCESS CALORIES (HCC): ICD-10-CM

## 2023-04-19 DIAGNOSIS — E55.9 VITAMIN D DEFICIENCY: ICD-10-CM

## 2023-04-19 DIAGNOSIS — F41.9 ANXIETY AND DEPRESSION: ICD-10-CM

## 2023-04-19 DIAGNOSIS — K21.9 GASTROESOPHAGEAL REFLUX DISEASE, UNSPECIFIED WHETHER ESOPHAGITIS PRESENT: ICD-10-CM

## 2023-04-19 PROCEDURE — 99204 OFFICE O/P NEW MOD 45 MIN: CPT | Performed by: FAMILY MEDICINE

## 2023-04-19 RX ORDER — NYSTATIN 100000 [USP'U]/G
POWDER TOPICAL 3 TIMES DAILY
Qty: 60 G | Refills: 2 | Status: SHIPPED | OUTPATIENT
Start: 2023-04-19

## 2023-04-19 RX ORDER — LEVOTHYROXINE SODIUM 88 UG/1
TABLET ORAL
COMMUNITY
Start: 2023-02-17

## 2023-04-19 RX ORDER — IBUPROFEN 800 MG/1
TABLET ORAL
COMMUNITY
Start: 2023-02-14 | End: 2023-04-19

## 2023-04-19 RX ORDER — OMEPRAZOLE 40 MG/1
40 CAPSULE, DELAYED RELEASE ORAL
Qty: 180 CAPSULE | Refills: 1 | Status: SHIPPED | OUTPATIENT
Start: 2023-04-19

## 2023-04-19 RX ORDER — DIAZEPAM 5 MG/1
TABLET ORAL
COMMUNITY
Start: 2023-02-17

## 2023-04-19 RX ORDER — HYDROXYZINE PAMOATE 100 MG/1
CAPSULE ORAL
COMMUNITY
Start: 2023-02-14

## 2023-04-19 RX ORDER — PANTOPRAZOLE SODIUM 40 MG/1
TABLET, DELAYED RELEASE ORAL
COMMUNITY
Start: 2023-02-14 | End: 2023-04-20

## 2023-04-19 SDOH — ECONOMIC STABILITY: HOUSING INSECURITY
IN THE LAST 12 MONTHS, WAS THERE A TIME WHEN YOU DID NOT HAVE A STEADY PLACE TO SLEEP OR SLEPT IN A SHELTER (INCLUDING NOW)?: NO

## 2023-04-19 SDOH — ECONOMIC STABILITY: INCOME INSECURITY: HOW HARD IS IT FOR YOU TO PAY FOR THE VERY BASICS LIKE FOOD, HOUSING, MEDICAL CARE, AND HEATING?: NOT HARD AT ALL

## 2023-04-19 SDOH — ECONOMIC STABILITY: FOOD INSECURITY: WITHIN THE PAST 12 MONTHS, YOU WORRIED THAT YOUR FOOD WOULD RUN OUT BEFORE YOU GOT MONEY TO BUY MORE.: NEVER TRUE

## 2023-04-19 SDOH — ECONOMIC STABILITY: FOOD INSECURITY: WITHIN THE PAST 12 MONTHS, THE FOOD YOU BOUGHT JUST DIDN'T LAST AND YOU DIDN'T HAVE MONEY TO GET MORE.: NEVER TRUE

## 2023-04-19 ASSESSMENT — PATIENT HEALTH QUESTIONNAIRE - PHQ9
SUM OF ALL RESPONSES TO PHQ QUESTIONS 1-9: 0
3. TROUBLE FALLING OR STAYING ASLEEP: 0
6. FEELING BAD ABOUT YOURSELF - OR THAT YOU ARE A FAILURE OR HAVE LET YOURSELF OR YOUR FAMILY DOWN: 0
2. FEELING DOWN, DEPRESSED OR HOPELESS: 0
8. MOVING OR SPEAKING SO SLOWLY THAT OTHER PEOPLE COULD HAVE NOTICED. OR THE OPPOSITE, BEING SO FIGETY OR RESTLESS THAT YOU HAVE BEEN MOVING AROUND A LOT MORE THAN USUAL: 0
10. IF YOU CHECKED OFF ANY PROBLEMS, HOW DIFFICULT HAVE THESE PROBLEMS MADE IT FOR YOU TO DO YOUR WORK, TAKE CARE OF THINGS AT HOME, OR GET ALONG WITH OTHER PEOPLE: 0
5. POOR APPETITE OR OVEREATING: 0
SUM OF ALL RESPONSES TO PHQ9 QUESTIONS 1 & 2: 0
1. LITTLE INTEREST OR PLEASURE IN DOING THINGS: 0
7. TROUBLE CONCENTRATING ON THINGS, SUCH AS READING THE NEWSPAPER OR WATCHING TELEVISION: 0
SUM OF ALL RESPONSES TO PHQ QUESTIONS 1-9: 0
SUM OF ALL RESPONSES TO PHQ QUESTIONS 1-9: 0
9. THOUGHTS THAT YOU WOULD BE BETTER OFF DEAD, OR OF HURTING YOURSELF: 0
4. FEELING TIRED OR HAVING LITTLE ENERGY: 0
SUM OF ALL RESPONSES TO PHQ QUESTIONS 1-9: 0

## 2023-04-19 NOTE — PROGRESS NOTES
Psychiatric:         Mood and Affect: Mood normal.         Behavior: Behavior normal.         Thought Content: Thought content normal.       Ortho Exam (If Applicable)              An electronic signature was used to authenticate this note.      Mary Jo Bowles MD

## 2023-04-20 ASSESSMENT — ENCOUNTER SYMPTOMS
CONSTIPATION: 0
VOMITING: 0
ABDOMINAL PAIN: 0
BLOOD IN STOOL: 0
COUGH: 0
SHORTNESS OF BREATH: 0
NAUSEA: 0
DIARRHEA: 0
ANAL BLEEDING: 0
CHEST TIGHTNESS: 0
WHEEZING: 0

## 2023-04-20 NOTE — ASSESSMENT & PLAN NOTE
Currently asymptomatic. Managed well with use of medication avoidance of dietary triggers.   Patient instructed to continue with current dose of omeprazole and lifestyle measures

## 2023-04-20 NOTE — ASSESSMENT & PLAN NOTE
Stable on current treatment plan. Patient instructed to continue with current dose of Singulair, Zyrtec, and nasal spray.

## 2023-04-20 NOTE — ASSESSMENT & PLAN NOTE
Stable respiratory status. Patient instructed continue with current regimen of Flovent, Singulair, and rescue inhalers.

## 2023-04-20 NOTE — ASSESSMENT & PLAN NOTE
Currently asymptomatic. Patient instructed to continue current dose of levothyroxine. We will follow over time and make future recommendations based on results and symptomatology.

## 2023-04-20 NOTE — ASSESSMENT & PLAN NOTE
Patient counseled on healthy dietary choices and the benefits of a lower salt and/or lower carbohydrate diet as appropriate. Patient also counseled on benefits of moderate intensity cardiovascular exercise for 150 minutes per week as they are able. Advice was given to make small changes over time, setting smaller achievable goals until recommended lifestyle changes are reached. I reviewed with patient at [facility] ([facility] now has a bariatrics program and a metabolic weight loss surgery would be an option for management.   Patient states he will consider this and contact the office in the future should she desire a referral to the specialist.

## 2023-04-24 ENCOUNTER — HOSPITAL ENCOUNTER (OUTPATIENT)
Dept: LAB | Age: 48
Discharge: HOME | End: 2023-04-24
Payer: MEDICAID

## 2023-04-24 DIAGNOSIS — Z11.59 NEED FOR HEPATITIS C SCREENING TEST: ICD-10-CM

## 2023-04-24 DIAGNOSIS — E03.9 HYPOTHYROIDISM, UNSPECIFIED TYPE: ICD-10-CM

## 2023-04-24 DIAGNOSIS — Z13.1 SCREENING FOR DIABETES MELLITUS: ICD-10-CM

## 2023-04-24 DIAGNOSIS — E55.9 VITAMIN D DEFICIENCY: ICD-10-CM

## 2023-04-24 DIAGNOSIS — Z13.220 SCREENING CHOLESTEROL LEVEL: ICD-10-CM

## 2023-04-24 DIAGNOSIS — D50.9 IRON DEFICIENCY ANEMIA, UNSPECIFIED IRON DEFICIENCY ANEMIA TYPE: ICD-10-CM

## 2023-04-24 LAB
ALBUMIN SERPL-MCNC: 3.9 G/DL (ref 3.5–4.6)
ALP SERPL-CCNC: 107 U/L (ref 40–130)
ALT SERPL-CCNC: 20 U/L (ref 0–33)
ANION GAP SERPL CALCULATED.3IONS-SCNC: 11 MEQ/L (ref 9–15)
AST SERPL-CCNC: 29 U/L (ref 0–35)
BASOPHILS # BLD: 0 K/UL (ref 0–0.2)
BASOPHILS NFR BLD: 0.6 %
BILIRUB SERPL-MCNC: 0.4 MG/DL (ref 0.2–0.7)
BUN SERPL-MCNC: 7 MG/DL (ref 6–20)
CALCIUM SERPL-MCNC: 9.3 MG/DL (ref 8.5–9.9)
CHLORIDE SERPL-SCNC: 100 MEQ/L (ref 95–107)
CHOLEST SERPL-MCNC: 170 MG/DL (ref 0–199)
CO2 SERPL-SCNC: 27 MEQ/L (ref 20–31)
CREAT SERPL-MCNC: 0.85 MG/DL (ref 0.5–0.9)
EOSINOPHIL # BLD: 0.2 K/UL (ref 0–0.7)
EOSINOPHIL NFR BLD: 2.7 %
ERYTHROCYTE [DISTWIDTH] IN BLOOD BY AUTOMATED COUNT: 16.5 % (ref 11.5–14.5)
GLOBULIN SER CALC-MCNC: 3.7 G/DL (ref 2.3–3.5)
GLUCOSE SERPL-MCNC: 88 MG/DL (ref 70–99)
HBA1C MFR BLD: 5.5 % (ref 4.8–5.9)
HCT VFR BLD AUTO: 36.8 % (ref 37–47)
HDLC SERPL-MCNC: 36 MG/DL (ref 40–59)
HGB BLD-MCNC: 11.8 G/DL (ref 12–16)
LDLC SERPL CALC-MCNC: 106 MG/DL (ref 0–129)
LYMPHOCYTES # BLD: 2 K/UL (ref 1–4.8)
LYMPHOCYTES NFR BLD: 23.9 %
MCH RBC QN AUTO: 25.2 PG (ref 27–31.3)
MCHC RBC AUTO-ENTMCNC: 32.1 % (ref 33–37)
MCV RBC AUTO: 78.4 FL (ref 79.4–94.8)
MONOCYTES # BLD: 0.4 K/UL (ref 0.2–0.8)
MONOCYTES NFR BLD: 5.3 %
NEUTROPHILS # BLD: 5.7 K/UL (ref 1.4–6.5)
NEUTS SEG NFR BLD: 67.5 %
PLATELET # BLD AUTO: 464 K/UL (ref 130–400)
POTASSIUM SERPL-SCNC: 5 MEQ/L (ref 3.4–4.9)
PROT SERPL-MCNC: 7.6 G/DL (ref 6.3–8)
RBC # BLD AUTO: 4.69 M/UL (ref 4.2–5.4)
SODIUM SERPL-SCNC: 138 MEQ/L (ref 135–144)
T4 FREE SERPL-MCNC: 1.34 NG/DL (ref 0.84–1.68)
TRIGL SERPL-MCNC: 139 MG/DL (ref 0–150)
TSH SERPL-MCNC: 2.24 UIU/ML (ref 0.44–3.86)
WBC # BLD AUTO: 8.4 K/UL (ref 4.8–10.8)

## 2023-04-24 PROCEDURE — 80061 LIPID PANEL: CPT

## 2023-04-24 PROCEDURE — 84443 ASSAY THYROID STIM HORMONE: CPT

## 2023-04-24 PROCEDURE — 82728 ASSAY OF FERRITIN: CPT

## 2023-04-24 PROCEDURE — 83036 HEMOGLOBIN GLYCOSYLATED A1C: CPT

## 2023-04-24 PROCEDURE — 83550 IRON BINDING TEST: CPT

## 2023-04-24 PROCEDURE — 86803 HEPATITIS C AB TEST: CPT

## 2023-04-24 PROCEDURE — 85025 COMPLETE CBC W/AUTO DIFF WBC: CPT

## 2023-04-24 PROCEDURE — 84439 ASSAY OF FREE THYROXINE: CPT

## 2023-04-24 PROCEDURE — 83540 ASSAY OF IRON: CPT

## 2023-04-24 PROCEDURE — 80053 COMPREHEN METABOLIC PANEL: CPT

## 2023-04-24 PROCEDURE — 36415 COLL VENOUS BLD VENIPUNCTURE: CPT

## 2023-04-25 LAB
FERRITIN: 35 NG/ML (ref 13–150)
HEPATITIS C ANTIBODY: NONREACTIVE
IRON SATURATION: 13 % (ref 20–55)
IRON: 47 UG/DL (ref 37–145)
TOTAL IRON BINDING CAPACITY: 365 UG/DL (ref 250–450)
UNSATURATED IRON BINDING CAPACITY: 318 UG/DL (ref 112–347)

## 2023-05-03 DIAGNOSIS — E87.5 HYPERKALEMIA: Primary | ICD-10-CM

## 2023-05-04 ENCOUNTER — OFFICE VISIT (OUTPATIENT)
Dept: ORTHOPEDIC SURGERY | Age: 48
End: 2023-05-04

## 2023-05-04 VITALS
OXYGEN SATURATION: 96 % | BODY MASS INDEX: 48.82 KG/M2 | WEIGHT: 293 LBS | HEIGHT: 65 IN | TEMPERATURE: 96 F | HEART RATE: 86 BPM

## 2023-05-04 DIAGNOSIS — G56.01 RIGHT CARPAL TUNNEL SYNDROME: Primary | ICD-10-CM

## 2023-05-04 DIAGNOSIS — G56.02 LEFT CARPAL TUNNEL SYNDROME: ICD-10-CM

## 2023-05-04 RX ORDER — IBUPROFEN 800 MG/1
TABLET ORAL
COMMUNITY
Start: 2023-04-20

## 2023-05-04 RX ORDER — CYCLOBENZAPRINE HCL 10 MG
TABLET ORAL
COMMUNITY
Start: 2023-04-20

## 2023-05-04 ASSESSMENT — ENCOUNTER SYMPTOMS
RESPIRATORY NEGATIVE: 1
ALLERGIC/IMMUNOLOGIC NEGATIVE: 1
GASTROINTESTINAL NEGATIVE: 1
EYES NEGATIVE: 1

## 2023-05-05 DIAGNOSIS — G56.02 LEFT CARPAL TUNNEL SYNDROME: ICD-10-CM

## 2023-05-05 DIAGNOSIS — G56.01 RIGHT CARPAL TUNNEL SYNDROME: Primary | ICD-10-CM

## 2023-05-09 ENCOUNTER — HOSPITAL ENCOUNTER (OUTPATIENT)
Dept: LAB | Age: 48
Discharge: HOME | End: 2023-05-09
Payer: COMMERCIAL

## 2023-05-09 DIAGNOSIS — E87.5 HYPERKALEMIA: ICD-10-CM

## 2023-05-09 LAB — POTASSIUM SERPL-SCNC: 4.7 MEQ/L (ref 3.4–4.9)

## 2023-05-09 PROCEDURE — 36415 COLL VENOUS BLD VENIPUNCTURE: CPT

## 2023-05-09 PROCEDURE — 84132 ASSAY OF SERUM POTASSIUM: CPT

## 2023-05-10 ENCOUNTER — HOSPITAL ENCOUNTER (OUTPATIENT)
Dept: OCCUPATIONAL THERAPY | Age: 48
Setting detail: THERAPIES SERIES
Discharge: HOME | End: 2023-05-10
Payer: COMMERCIAL

## 2023-05-10 PROCEDURE — 97166 OT EVAL MOD COMPLEX 45 MIN: CPT

## 2023-05-10 PROCEDURE — 97110 THERAPEUTIC EXERCISES: CPT

## 2023-05-10 ASSESSMENT — 9 HOLE PEG TEST
TESTTIME_SECONDS: 31.65
TESTTIME_SECONDS: 30.41
TEST_RESULT: IMPAIRED

## 2023-05-10 ASSESSMENT — PAIN SCALES - GENERAL: PAINLEVEL_OUTOF10: 8

## 2023-05-10 NOTE — PROGRESS NOTES
Occupational Therapy: Initial Evaluation   Patient: Lurdes Villa (67 y.o. female)   Examination Date:   Plan of Care Certification Period: 5/10/2023 to 23      :  1975  MRN: [id number]  CSN: [id number]   Insurance: Payor: Rafael Ralph / Plan: Rick Dougherty / Product Type: *No Product type* /   Insurance ID: [id number] - (Medicaid Managed) Secondary Insurance (if applicable):     Insurance Information:     Referring Physician: MD Eduardo Mcgrath MD   PCP: Cony Roman MD Visits to Date/Visits Approved:       No Show/Cancelled Appts:      Medical Diagnosis: Right carpal tunnel syndrome [G56.01]  Left carpal tunnel syndrome [G56.02] Pt referred to outpatient occupational therapy d/t B carpal tunnel syndrome  Treatment Diagnosis: Decreased ADL/IADL performance d/t B carpal tunnel syndrome         PERTINENT MEDICAL HISTORY   Patient assessed for rehabilitation services?: Yes  Self reported health status[de-identified] Fair    Medical History: Chart Reviewed: Yes   Past Medical History:   Diagnosis Date    Anxiety     Asthma     Fibromyalgia 2023    GERD (gastroesophageal reflux disease) 2023    History of substance abuse ([facility] [address] 10/10/2019    Perennial allergic rhinitis 10/10/2019    Primary osteoarthritis involving multiple joints 10/10/2019    Rupture of Achilles tendon 2018    Rupture of gastrocnemius muscle 2018     Surgical History:   Past Surgical History:   Procedure Laterality Date    TUBAL LIGATION         Medications:   Current Outpatient Medications:     cyclobenzaprine (FLEXERIL) 10 MG tablet, TAKE 1 TABLET BY MOUTH 3 TIMES A DAY AS NEEDED FOR MUSCLE SPASM FOR 15 DAYS **LIMIT EXCEEDED**, Disp: , Rfl:     ibuprofen (ADVIL;MOTRIN) 800 MG tablet, TAKE 1 TABLET BY MOUTH THREE TIMES A DAY WITH FOOD OR MILK AS NEEDED FOR 10 DAYS, Disp: , Rfl:     hydrOXYzine pamoate (VISTARIL) 100 MG capsule, TAKE 1 CAPSULE BY MOUTH THREE TIMES A DAY AS

## 2023-05-12 ENCOUNTER — HOSPITAL ENCOUNTER (OUTPATIENT)
Dept: NEUROLOGY | Age: 48
Discharge: HOME | End: 2023-05-12
Payer: COMMERCIAL

## 2023-05-12 DIAGNOSIS — G56.01 RIGHT CARPAL TUNNEL SYNDROME: ICD-10-CM

## 2023-05-12 DIAGNOSIS — G56.02 LEFT CARPAL TUNNEL SYNDROME: ICD-10-CM

## 2023-05-12 PROCEDURE — 95886 MUSC TEST DONE W/N TEST COMP: CPT

## 2023-05-12 PROCEDURE — 95910 NRV CNDJ TEST 7-8 STUDIES: CPT

## 2023-05-12 NOTE — PROCEDURES
[address]                      [address], [address] [facility]                             ELECTROMYOGRAM REPORT    PATIENT NAME: Hattie Bray                  :        1975  MED REC NO:   [id number]                            ROOM:  ACCOUNT NO:   [de-identified]                           ADMIT DATE: 2023  PROVIDER:     Carine Tirado MD    DATE OF EM2023    REFERRING PROVIDER:  Benji Howard MD.    REASON FOR STUDY:  The patient was having pain and numbness in the hands  at times. She had pain in the neck with radiation to the upper  extremities. She has a positive family history of diabetes. Previous  study was available for comparison from 2020. FINDINGS:  Motor nerve conduction velocities and F-wave latencies are  normal in all the nerves tested. Distal motor latencies are normal in the ulnar nerves, but mildly  delayed in the median nerves, being worse on the right side. Distal sensory latencies are normal in the ulnar nerves and borderline  delayed in the median nerves bilaterally. On concentric needle electrode examination, mild denervation changes are  present in the small muscles of the hands bilaterally. Denervation  changes are also seen in bilateral C8 root distribution. CLINICAL INTERPRETATION:  Electrodiagnostic studies are consistent with  the patient's diagnosis of mild carpal tunnel syndrome, which in fact seems a  little better as compared to the previous study. The patient has changes of bilateral mild C8 radiculopathy. The patient  is being tried on conservative management. If clinically indicated,imaging of the cervical canal may be done to look for compromise of the  spinal canal and/or foramina. If her symptoms of carpal tunnel syndrome continue or worsen, she will  need decompression of the median nerves. Thank you Dr. Herson Valente for allowing me to see this patient.   Please feel  free to call

## 2023-05-19 ENCOUNTER — HOSPITAL ENCOUNTER (OUTPATIENT)
Dept: OCCUPATIONAL THERAPY | Age: 48
Setting detail: THERAPIES SERIES
Discharge: HOME | End: 2023-05-19
Payer: COMMERCIAL

## 2023-05-19 PROCEDURE — 97035 APP MDLTY 1+ULTRASOUND EA 15: CPT

## 2023-05-19 PROCEDURE — 97110 THERAPEUTIC EXERCISES: CPT

## 2023-05-19 ASSESSMENT — PAIN SCALES - GENERAL: PAINLEVEL_OUTOF10: 9

## 2023-05-19 NOTE — PROGRESS NOTES
educated to complete slowly and integrate small portions at a time to reduce flare up of pain or overstretching of nerve. Limitations addressed: Mobility, Strength, Coordination, Flexibility, Proprioception, Activity tolerance, Posture, Pain modulation  Therapist provided: Assistance, Verbal cuing  Therapist provided: Handout  Progressed: Repetitions, Complexity of movement, Duration  Functional ability(s) targeted: Tolerance to age appropriate activities, Performing self care actvities   1:1 Time (minutes): 30                 Modalities  Ultrasound: (CPT 88033) Treatment Reasoning    Patient Position: Seated  Ultrasound location: Right, Left, Hand, Wrist  Ultrasound frequency: 3 MHz  Ultrasound intensity (W/cm2): 1.5  Ultrasound mode: Pulsed (50%)  Post treatment skin assessment: Intact Limitations addressed: Pain modulation, Tissue extensibility, Edema, Joint mobility  Therapist provided: Assistance, Verbal cuing  Progressed: Complexity of movement, Duration  Functional ability(s) targeted: Tolerance to age appropriate activities, Performing self care activities   1:1 Time (minutes): 20              Treatment Summary:   Treatment Summary  1:1 Time : 48    Patient Education: OT Education: OT Role, Plan of Care, Home Exercise Program, Precautions, ADL Adaptive Strategies, IADL Safety, Evaluative findings, Lifting mechanics, Posture, Goal setting, Equipment, Anatomy of condition       ASSESSMENT     Assessment: Assessment: OT intervention 5/19/23, parafin used on B hands to reduce pain/edema and improve soft tissue extensibility. US applied on front/back of B hands to reduce pain and increase AROM at wrist. Pt educated on anatomy of condition with review of HEP completed, pt demo's competency and compliance in home exercise. Added median, ulnar, radial nerve glides to address numbness/tingling/burning in BUE. Cut new compression stockinette to wear under brace for edema and pain management.  Educated pt on joint

## 2023-05-22 ENCOUNTER — TELEPHONE (OUTPATIENT)
Dept: ORTHOPEDIC SURGERY | Age: 48
End: 2023-05-22

## 2023-05-25 ENCOUNTER — OFFICE VISIT (OUTPATIENT)
Dept: FAMILY MEDICINE CLINIC | Age: 48
End: 2023-05-25
Payer: COMMERCIAL

## 2023-05-25 ENCOUNTER — TELEPHONE (OUTPATIENT)
Dept: FAMILY MEDICINE CLINIC | Age: 48
End: 2023-05-25

## 2023-05-25 VITALS
HEIGHT: 65 IN | HEART RATE: 78 BPM | BODY MASS INDEX: 48.82 KG/M2 | SYSTOLIC BLOOD PRESSURE: 120 MMHG | WEIGHT: 293 LBS | TEMPERATURE: 97.5 F | OXYGEN SATURATION: 96 % | DIASTOLIC BLOOD PRESSURE: 84 MMHG

## 2023-05-25 DIAGNOSIS — Z12.11 SCREENING FOR COLON CANCER: ICD-10-CM

## 2023-05-25 DIAGNOSIS — M54.41 CHRONIC BILATERAL LOW BACK PAIN WITH BILATERAL SCIATICA: ICD-10-CM

## 2023-05-25 DIAGNOSIS — J30.89 PERENNIAL ALLERGIC RHINITIS: ICD-10-CM

## 2023-05-25 DIAGNOSIS — D50.9 IRON DEFICIENCY ANEMIA, UNSPECIFIED IRON DEFICIENCY ANEMIA TYPE: ICD-10-CM

## 2023-05-25 DIAGNOSIS — M54.42 CHRONIC BILATERAL LOW BACK PAIN WITH BILATERAL SCIATICA: ICD-10-CM

## 2023-05-25 DIAGNOSIS — F32.A ANXIETY AND DEPRESSION: ICD-10-CM

## 2023-05-25 DIAGNOSIS — G43.009 MIGRAINE WITHOUT AURA AND WITHOUT STATUS MIGRAINOSUS, NOT INTRACTABLE: ICD-10-CM

## 2023-05-25 DIAGNOSIS — K21.9 GASTROESOPHAGEAL REFLUX DISEASE, UNSPECIFIED WHETHER ESOPHAGITIS PRESENT: ICD-10-CM

## 2023-05-25 DIAGNOSIS — E03.9 HYPOTHYROIDISM, UNSPECIFIED TYPE: ICD-10-CM

## 2023-05-25 DIAGNOSIS — Z12.31 ENCOUNTER FOR SCREENING MAMMOGRAM FOR MALIGNANT NEOPLASM OF BREAST: ICD-10-CM

## 2023-05-25 DIAGNOSIS — J45.40 MODERATE PERSISTENT ASTHMA WITHOUT COMPLICATION: Primary | ICD-10-CM

## 2023-05-25 DIAGNOSIS — E55.9 VITAMIN D DEFICIENCY: ICD-10-CM

## 2023-05-25 DIAGNOSIS — G89.29 CHRONIC BILATERAL LOW BACK PAIN WITH BILATERAL SCIATICA: ICD-10-CM

## 2023-05-25 DIAGNOSIS — J45.40 MODERATE PERSISTENT ASTHMA WITHOUT COMPLICATION: ICD-10-CM

## 2023-05-25 DIAGNOSIS — M15.9 PRIMARY OSTEOARTHRITIS INVOLVING MULTIPLE JOINTS: ICD-10-CM

## 2023-05-25 DIAGNOSIS — E66.01 MORBID OBESITY DUE TO EXCESS CALORIES (HCC): ICD-10-CM

## 2023-05-25 DIAGNOSIS — F41.9 ANXIETY AND DEPRESSION: ICD-10-CM

## 2023-05-25 PROCEDURE — 1036F TOBACCO NON-USER: CPT | Performed by: FAMILY MEDICINE

## 2023-05-25 PROCEDURE — 99214 OFFICE O/P EST MOD 30 MIN: CPT | Performed by: FAMILY MEDICINE

## 2023-05-25 PROCEDURE — G8417 CALC BMI ABV UP PARAM F/U: HCPCS | Performed by: FAMILY MEDICINE

## 2023-05-25 PROCEDURE — G8427 DOCREV CUR MEDS BY ELIG CLIN: HCPCS | Performed by: FAMILY MEDICINE

## 2023-05-25 RX ORDER — ALBUTEROL SULFATE 90 UG/1
2 AEROSOL, METERED RESPIRATORY (INHALATION) EVERY 6 HOURS PRN
Qty: 18 G | Refills: 0 | Status: SHIPPED | OUTPATIENT
Start: 2023-05-25

## 2023-05-25 RX ORDER — LEVOTHYROXINE SODIUM 88 UG/1
88 TABLET ORAL DAILY
Qty: 90 TABLET | Refills: 1 | Status: SHIPPED | OUTPATIENT
Start: 2023-05-25

## 2023-05-25 RX ORDER — BUTALBITAL, ACETAMINOPHEN AND CAFFEINE 50; 325; 40 MG/1; MG/1; MG/1
2 TABLET ORAL EVERY 4 HOURS PRN
Qty: 180 TABLET | Status: CANCELLED | OUTPATIENT
Start: 2023-05-25 | End: 2027-02-17

## 2023-05-25 RX ORDER — FERROUS SULFATE 137(45) MG
142 TABLET, EXTENDED RELEASE ORAL DAILY
Qty: 90 TABLET | Refills: 1 | Status: SHIPPED | OUTPATIENT
Start: 2023-05-25

## 2023-05-25 RX ORDER — ZOLPIDEM TARTRATE 10 MG/1
TABLET ORAL
Status: CANCELLED | OUTPATIENT
Start: 2023-05-25

## 2023-05-25 RX ORDER — CETIRIZINE HYDROCHLORIDE 10 MG/1
10 TABLET ORAL DAILY
Qty: 90 TABLET | Refills: 1 | Status: SHIPPED | OUTPATIENT
Start: 2023-05-25

## 2023-05-25 RX ORDER — MONTELUKAST SODIUM 10 MG/1
10 TABLET ORAL NIGHTLY
Qty: 90 TABLET | Refills: 1 | Status: SHIPPED | OUTPATIENT
Start: 2023-05-25

## 2023-05-25 RX ORDER — PREGABALIN 100 MG/1
CAPSULE ORAL
COMMUNITY
Start: 2023-05-23

## 2023-05-25 RX ORDER — HYDROXYZINE PAMOATE 100 MG/1
CAPSULE ORAL
Status: CANCELLED | OUTPATIENT
Start: 2023-05-25

## 2023-05-25 RX ORDER — FLUTICASONE PROPIONATE 110 UG/1
2 AEROSOL, METERED RESPIRATORY (INHALATION) 2 TIMES DAILY
Qty: 36 G | Refills: 1 | Status: SHIPPED | OUTPATIENT
Start: 2023-05-25 | End: 2023-05-26 | Stop reason: SDUPTHER

## 2023-05-25 ASSESSMENT — ENCOUNTER SYMPTOMS
BACK PAIN: 1
VOMITING: 0
CONSTIPATION: 0
BLOOD IN STOOL: 0
NAUSEA: 0
ABDOMINAL PAIN: 0
ABDOMINAL DISTENTION: 0
CHEST TIGHTNESS: 0
RECTAL PAIN: 0
WHEEZING: 0
COUGH: 0
ANAL BLEEDING: 0
SHORTNESS OF BREATH: 0
DIARRHEA: 0

## 2023-05-25 NOTE — ASSESSMENT & PLAN NOTE
Patient will contact insurance to find names of pain management providers in the area who are in network for her insurance. I will send referral once information is relayed by patient to the office.

## 2023-05-25 NOTE — ASSESSMENT & PLAN NOTE
Stable on current treatment plan. Patient instructed to continue with current dose of Zyrtec, Singulair, and nasal spray.

## 2023-05-25 NOTE — ASSESSMENT & PLAN NOTE
Currently asymptomatic. Managed well with use of medication and avoidance of dietary triggers. Patient instructed to continue with current dose of omeprazole and lifestyle measures.

## 2023-05-25 NOTE — ASSESSMENT & PLAN NOTE
Currently asymptomatic. Most recent TSH level normal.  Patient instructed to continue with current dose of levothyroxine.

## 2023-05-25 NOTE — ASSESSMENT & PLAN NOTE
I reviewed with patient the importance of taking vitamin D supplement as previously prescribed. We will obtain lab work to follow over time.

## 2023-05-25 NOTE — TELEPHONE ENCOUNTER
Jake Schwab from [facility] calling and asking for a prior authorization for Flovent -   3 inhalers every month or every 3 months ?       Rody

## 2023-05-25 NOTE — PROGRESS NOTES
Macey Stark (: 1975) is a 50 y.o. female, Established patient, who presents today for:    Chief Complaint   Patient presents with    Discuss Medications     Patient Is present to discuss medications          ASSESSMENT/PLAN    1. Moderate persistent asthma without complication  Assessment & Plan:  Stable respiratory status. Patient instructed to continue with current doses of Flovent, Singulair, and rescue inhaler. Orders:  -     montelukast (SINGULAIR) 10 MG tablet; Take 1 tablet by mouth nightly, Disp-90 tablet, R-1Normal  -     fluticasone (FLOVENT HFA) 110 MCG/ACT inhaler; Inhale 2 puffs into the lungs 2 times daily, Disp-36 g, R-1Normal  -     albuterol sulfate HFA (PROAIR HFA) 108 (90 Base) MCG/ACT inhaler; Inhale 2 puffs into the lungs every 6 hours as needed for Wheezing or Shortness of Breath, Disp-18 g, R-0Normal  2. Perennial allergic rhinitis  Assessment & Plan:   Stable on current treatment plan. Patient instructed to continue with current dose of Zyrtec, Singulair, and nasal spray. Orders:  -     cetirizine (ZYRTEC) 10 MG tablet; Take 1 tablet by mouth daily, Disp-90 tablet, R-1Normal  -     montelukast (SINGULAIR) 10 MG tablet; Take 1 tablet by mouth nightly, Disp-90 tablet, R-1Normal  3. Hypothyroidism, unspecified type  Assessment & Plan:   Currently asymptomatic. Most recent TSH level normal.  Patient instructed to continue with current dose of levothyroxine. Orders:  -     levothyroxine (SYNTHROID) 88 MCG tablet; Take 1 tablet by mouth Daily, Disp-90 tablet, R-1Normal  4. Gastroesophageal reflux disease, unspecified whether esophagitis present  Assessment & Plan:   Currently asymptomatic. Managed well with use of medication and avoidance of dietary triggers. Patient instructed to continue with current dose of omeprazole and lifestyle measures.   5. Iron deficiency anemia, unspecified iron deficiency anemia type  Assessment & Plan:  Patient agrees to start daily slow iron

## 2023-05-25 NOTE — ASSESSMENT & PLAN NOTE
Stable respiratory status. Patient instructed to continue with current doses of Flovent, Singulair, and rescue inhaler.

## 2023-05-25 NOTE — ASSESSMENT & PLAN NOTE
Patient will contact insurance to find names of neurology providers in the area who are in network for her insurance. I will send referral once information is relayed by patient to the office.

## 2023-05-25 NOTE — ASSESSMENT & PLAN NOTE
Patient agrees to start slow iron supplement based on most recent lab work showing microcytosis in the setting of borderline low iron level

## 2023-05-26 ENCOUNTER — HOSPITAL ENCOUNTER (OUTPATIENT)
Dept: OCCUPATIONAL THERAPY | Age: 48
Setting detail: THERAPIES SERIES
Discharge: HOME | End: 2023-05-26
Payer: COMMERCIAL

## 2023-05-26 PROCEDURE — 97035 APP MDLTY 1+ULTRASOUND EA 15: CPT

## 2023-05-26 PROCEDURE — 97530 THERAPEUTIC ACTIVITIES: CPT

## 2023-05-26 PROCEDURE — 97110 THERAPEUTIC EXERCISES: CPT

## 2023-05-26 RX ORDER — FLUTICASONE PROPIONATE 110 UG/1
2 AEROSOL, METERED RESPIRATORY (INHALATION) 2 TIMES DAILY
Qty: 12 G | Refills: 5 | Status: SHIPPED | OUTPATIENT
Start: 2023-05-26

## 2023-05-26 ASSESSMENT — PAIN SCALES - GENERAL: PAINLEVEL_OUTOF10: 9

## 2023-05-26 NOTE — TELEPHONE ENCOUNTER
90 day prescription was given at most recent visit - 3 inhalers every 3 months. I have sent a new prescription for 30 day with 5 refills in the event that 90 days is not covered.

## 2023-05-26 NOTE — PROGRESS NOTES
Occupational Therapy: Daily Note   Patient: Adele Atwood (75 y.o. female)   Examination Date:   Certification Period Expiration Date: 23    No data recorded   :  1975 # of Visits since [facility]:   Visit count could not be calculated. Make sure you are using a visit which is associated with an episode. MRN: [id number]  CSN: [id number] Start of Care Date:   No linked episodes   Insurance: Payor: Megan Person / Plan: Jose April / Product Type: *No Product type* /   Insurance ID: [id number] - (Medicaid Managed) Secondary Insurance (if applicable): Insurance Information:     Referring Physician: MD Krys Mukherjee MD   PCP: Tori Oneil MD Visits to Date/Visits Approved: 3 / 6    No Show/Cancelled Appts: 0 / 0     Medical Diagnosis: Carpal tunnel syndrome, right upper limb [G56.01]  Carpal tunnel syndrome, left upper limb [G56.02] Pt referred to outpatient occupational therapy d/t B carpal tunnel syndrome  Treatment Diagnosis: Decreased ADL/IADL performance d/t B carpal tunnel syndrome        SUBJECTIVE EXAMINATION   Pain Level: Pain Screening  Patient Currently in Pain: Yes  Pain Level: 9    Patient Comments: Subjective: \"The nerve glides are going well but my neck hurts a little when I do it. \"    HEP Compliance: Good  Previous treatments prior to current episode?: Outpatient OT     OBJECTIVE EXAMINATION   Restrictions: No data recorded        TREATMENT     Exercises: Therapeutic exercise (CPT 37513)   Treatment Reasoning    OT Exercise 1: Provided pt with orange soft putty for B hand strengthening. Trained and educated pt in pincer grasp exercises including rolling putty into long cylindar shape, then using alternating opposing fingers to complete pincer grasp and isolated presses. Pt utilized bimanual skills of pulling putty to increase grasp strength, hamilton grasp.  Pt seperated putty into small sections and rolled for soft tissue mobilization over thenar

## 2023-06-02 ENCOUNTER — HOSPITAL ENCOUNTER (OUTPATIENT)
Dept: OCCUPATIONAL THERAPY | Age: 48
Setting detail: THERAPIES SERIES
Discharge: HOME | End: 2023-06-02
Payer: COMMERCIAL

## 2023-06-02 PROCEDURE — 97110 THERAPEUTIC EXERCISES: CPT

## 2023-06-02 PROCEDURE — 97035 APP MDLTY 1+ULTRASOUND EA 15: CPT

## 2023-06-02 ASSESSMENT — PAIN SCALES - GENERAL: PAINLEVEL_OUTOF10: 6

## 2023-06-02 NOTE — PROGRESS NOTES
Occupational Therapy: Daily Note   Patient: Diana Yao (89 y.o. female)   Examination Date:   Certification Period Expiration Date: 23    No data recorded   :  1975 # of Visits since [facility]:   Visit count could not be calculated. Make sure you are using a visit which is associated with an episode. MRN: [id number]  CSN: [id number] Start of Care Date:   No linked episodes   Insurance: Payor: Jose Kenyon / Plan: Mason Talbot / Product Type: *No Product type* /   Insurance ID: [id number] - (Medicaid Managed) Secondary Insurance (if applicable): Insurance Information:     Referring Physician: MD Rudy Vanegas MD   PCP: Peace Rodriguez MD Visits to Date/Visits Approved:     No Show/Cancelled Appts: 0 / 0     Medical Diagnosis: Carpal tunnel syndrome, right upper limb [G56.01]  Carpal tunnel syndrome, left upper limb [G56.02] Pt referred to outpatient occupational therapy d/t B carpal tunnel syndrome  Treatment Diagnosis: Decreased ADL/IADL performance d/t B carpal tunnel syndrome        SUBJECTIVE EXAMINATION   Pain Level: Pain Screening  Patient Currently in Pain: Yes  Pain Level: 6    Patient Comments: Subjective: \"My arms are getting a little better but my legs are painful now too. \"    HEP Compliance: Good  Previous treatments prior to current episode?: Outpatient OT     OBJECTIVE EXAMINATION   Restrictions: No data recorded        TREATMENT     Exercises: Therapeutic exercise (CPT 94274)   Treatment Reasoning    OT Exercise 1: Added x-soft putty to pt's current putty to reduce strength. Pt reports she was having pain and difficulty with putty exercises d/t reports putty is too stiff. Pt manipulated putty x10 minutes performing pincer grasp and hamilton grasp to improve grasp strength and activity tolerance for IADL performance.   OT Exercise 2: Pt completed two of each nerve glide- ulnar, median, radial nerve glides/slides with 5 second hold in each

## 2023-06-08 ENCOUNTER — HOSPITAL ENCOUNTER (OUTPATIENT)
Dept: OCCUPATIONAL THERAPY | Age: 48
Setting detail: THERAPIES SERIES
Discharge: HOME | End: 2023-06-08
Payer: COMMERCIAL

## 2023-06-08 LAB — NONINV COLON CA DNA+OCC BLD SCRN STL QL: NORMAL

## 2023-06-08 PROCEDURE — 97140 MANUAL THERAPY 1/> REGIONS: CPT

## 2023-06-08 PROCEDURE — 97530 THERAPEUTIC ACTIVITIES: CPT

## 2023-06-08 PROCEDURE — 97035 APP MDLTY 1+ULTRASOUND EA 15: CPT

## 2023-06-08 ASSESSMENT — PAIN SCALES - GENERAL: PAINLEVEL_OUTOF10: 7

## 2023-06-08 NOTE — PROGRESS NOTES
Tolerate modalities such as paraffin, US, e-stim, KT tape x20 minutes to reduce pain and improve AROM at B wrists   Met   Demo HEP for B wrist and hands including splint management I   Met                                                TREATMENT PLAN   REQUIRES OT FOLLOW-UP: Yes  Type: Outpatient  Treatment Initiated : Yes  Plan  Plan Frequency: x1/wk  Plan Weeks: 5-6  Current Treatment Recommendations: Strengthening, ROM, Endurance training, Patient/Caregiver education & training, Self-Care / ADL, Safety education & training, Pain management, Positioning, Modalities, Home management training, Coordination training       Therapy Time  Individual Time In:  1:30pm  Individual Time Out:  2:30pm  Minutes:  60          Electronically signed by Jillian Pearl [id number]  on 6/8/2023 at 2:43 PM   POC NOTE

## 2023-06-19 ENCOUNTER — TELEPHONE (OUTPATIENT)
Dept: FAMILY MEDICINE CLINIC | Age: 48
End: 2023-06-19

## 2023-06-19 DIAGNOSIS — M54.42 CHRONIC BILATERAL LOW BACK PAIN WITH BILATERAL SCIATICA: Primary | ICD-10-CM

## 2023-06-19 DIAGNOSIS — M54.41 CHRONIC BILATERAL LOW BACK PAIN WITH BILATERAL SCIATICA: Primary | ICD-10-CM

## 2023-06-19 DIAGNOSIS — M15.9 PRIMARY OSTEOARTHRITIS INVOLVING MULTIPLE JOINTS: ICD-10-CM

## 2023-06-19 DIAGNOSIS — G89.29 CHRONIC BILATERAL LOW BACK PAIN WITH BILATERAL SCIATICA: Primary | ICD-10-CM

## 2023-06-19 RX ORDER — MELOXICAM 7.5 MG/1
TABLET ORAL
Qty: 30 TABLET | OUTPATIENT
Start: 2023-06-19

## 2023-06-19 RX ORDER — CYCLOBENZAPRINE HCL 10 MG
TABLET ORAL
OUTPATIENT
Start: 2023-06-19

## 2023-06-20 RX ORDER — MELOXICAM 15 MG/1
15 TABLET ORAL DAILY
Qty: 90 TABLET | Refills: 0 | Status: SHIPPED | OUTPATIENT
Start: 2023-06-20 | End: 2023-07-06 | Stop reason: SDUPTHER

## 2023-06-23 ENCOUNTER — APPOINTMENT (OUTPATIENT)
Dept: OCCUPATIONAL THERAPY | Age: 48
End: 2023-06-23
Payer: COMMERCIAL

## 2023-07-04 ENCOUNTER — HOSPITAL ENCOUNTER (EMERGENCY)
Age: 48
Discharge: HOME | End: 2023-07-04
Payer: COMMERCIAL

## 2023-07-04 ENCOUNTER — APPOINTMENT (OUTPATIENT)
Dept: GENERAL RADIOLOGY | Age: 48
End: 2023-07-04
Payer: COMMERCIAL

## 2023-07-04 VITALS
DIASTOLIC BLOOD PRESSURE: 93 MMHG | BODY MASS INDEX: 48.82 KG/M2 | OXYGEN SATURATION: 94 % | WEIGHT: 293 LBS | TEMPERATURE: 97.9 F | SYSTOLIC BLOOD PRESSURE: 138 MMHG | RESPIRATION RATE: 18 BRPM | HEIGHT: 65 IN | HEART RATE: 95 BPM

## 2023-07-04 DIAGNOSIS — M79.652 LEFT THIGH PAIN: Primary | ICD-10-CM

## 2023-07-04 DIAGNOSIS — T14.8XXA MUSCLE STRAIN: ICD-10-CM

## 2023-07-04 PROCEDURE — 99283 EMERGENCY DEPT VISIT LOW MDM: CPT

## 2023-07-04 PROCEDURE — 6370000000 HC RX 637 (ALT 250 FOR IP)

## 2023-07-04 PROCEDURE — 73562 X-RAY EXAM OF KNEE 3: CPT

## 2023-07-04 RX ORDER — CYCLOBENZAPRINE HCL 10 MG
10 TABLET ORAL ONCE
Status: COMPLETED | OUTPATIENT
Start: 2023-07-04 | End: 2023-07-04

## 2023-07-04 RX ORDER — HYDROCODONE BITARTRATE AND ACETAMINOPHEN 5; 325 MG/1; MG/1
1 TABLET ORAL ONCE
Status: COMPLETED | OUTPATIENT
Start: 2023-07-04 | End: 2023-07-04

## 2023-07-04 RX ORDER — HYDROCODONE BITARTRATE AND ACETAMINOPHEN 5; 325 MG/1; MG/1
1 TABLET ORAL EVERY 8 HOURS PRN
Qty: 10 TABLET | Refills: 0 | Status: SHIPPED | OUTPATIENT
Start: 2023-07-04 | End: 2023-07-07

## 2023-07-04 RX ADMIN — HYDROCODONE BITARTRATE AND ACETAMINOPHEN 1 TABLET: 5; 325 TABLET ORAL at 17:25

## 2023-07-04 RX ADMIN — CYCLOBENZAPRINE 10 MG: 10 TABLET, FILM COATED ORAL at 17:24

## 2023-07-04 ASSESSMENT — ENCOUNTER SYMPTOMS
DIARRHEA: 0
ABDOMINAL PAIN: 0
SHORTNESS OF BREATH: 0
COUGH: 0
NAUSEA: 0
SORE THROAT: 0
VOMITING: 0
BACK PAIN: 0

## 2023-07-04 ASSESSMENT — PAIN SCALES - GENERAL
PAINLEVEL_OUTOF10: 6
PAINLEVEL_OUTOF10: 6

## 2023-07-04 ASSESSMENT — PAIN DESCRIPTION - FREQUENCY: FREQUENCY: CONTINUOUS

## 2023-07-04 ASSESSMENT — PAIN DESCRIPTION - ORIENTATION: ORIENTATION: LEFT;INNER;UPPER

## 2023-07-04 ASSESSMENT — PAIN DESCRIPTION - PAIN TYPE: TYPE: ACUTE PAIN

## 2023-07-04 ASSESSMENT — PAIN DESCRIPTION - LOCATION: LOCATION: LEG

## 2023-07-04 ASSESSMENT — PAIN - FUNCTIONAL ASSESSMENT: PAIN_FUNCTIONAL_ASSESSMENT: 0-10

## 2023-07-04 NOTE — ED TRIAGE NOTES
Patient with left hip pain .  She heard a pop or tearing like feeling walking down stairs at home around 1pm.

## 2023-07-04 NOTE — ED PROVIDER NOTES
[facility] ED  eMERGENCYdEPARTMENT eNCOUnter      Pt Name: Taco Hensley  MRN: [id number]  [id number] evaluation: 7/4/2023  Provider:GERARDO Cornejo - CNP    CHIEF COMPLAINT       Chief Complaint   Patient presents with    Leg Injury     Left leg felt and heard a loud pop while going down the stairs          HISTORY OF PRESENT ILLNESS  (Location/Symptom, Timing/Onset, Context/Setting, Quality, Duration, Modifying Factors, Severity.)   Taco Hensley is a 50 y.o. female hx of Asthma, Anxiety, GERD, Fibromyalgia, who presents to the emergency department with leg injury. States she was walking down her stairs at home this morning when she felt a pulling sensation behind her left knee going up the back of her left thigh, she then felt what she describes as a pop. She denies any fall injury or trauma. Complains of pain behind her left knee that shoots up the back of her thigh she rates a 6 out of 10 spasm, pulling sensation worse with movement. She has not taken anything for her pain today. She was able to get herself into the car and drive herself to the emergency department for evaluation. She denies any chest pain, shortness of breath, abdominal pain, nausea, vomiting, diarrhea, fever, chills, headache, recent illness. HPI    Nursing Notes were reviewed and I agree. REVIEW OF SYSTEMS    (2-9 systems for level 4, 10 or more for level 5)     Review of Systems   Constitutional:  Negative for activity change, chills and fever. HENT:  Negative for ear pain and sore throat. Eyes:  Negative for visual disturbance. Respiratory:  Negative for cough and shortness of breath. Cardiovascular:  Negative for chest pain, palpitations and leg swelling. Gastrointestinal:  Negative for abdominal pain, diarrhea, nausea and vomiting. Genitourinary:  Negative for dysuria. Musculoskeletal:  Positive for myalgias (left thigh). Negative for back pain. Skin:  Negative for rash.

## 2023-07-04 NOTE — DISCHARGE INSTRUCTIONS
Please follow up with orthopedics tomorrow. Use your Rolator as discussed. Return to ED for any new or worsening symptoms.

## 2023-07-05 NOTE — ED NOTES
Pt called into ER stating she has appointment scheduled for 7/12. Pt asking for additional pain medication to be called in for her.  Informed patient that we are unable to call in pain medications and that she can try and contact orthopedics for an earlier appointment, Pt given number for center for orthopedics and informed they have walk in hours should she want to be seen earlier than the [address], RN  07/05/23 [id number]

## 2023-07-06 ENCOUNTER — OFFICE VISIT (OUTPATIENT)
Dept: FAMILY MEDICINE CLINIC | Age: 48
End: 2023-07-06
Payer: COMMERCIAL

## 2023-07-06 VITALS
WEIGHT: 293 LBS | OXYGEN SATURATION: 98 % | BODY MASS INDEX: 69.89 KG/M2 | SYSTOLIC BLOOD PRESSURE: 138 MMHG | DIASTOLIC BLOOD PRESSURE: 88 MMHG | HEART RATE: 75 BPM

## 2023-07-06 DIAGNOSIS — M54.41 CHRONIC BILATERAL LOW BACK PAIN WITH BILATERAL SCIATICA: ICD-10-CM

## 2023-07-06 DIAGNOSIS — M79.652 ACUTE PAIN OF LEFT THIGH: Primary | ICD-10-CM

## 2023-07-06 DIAGNOSIS — M54.42 CHRONIC BILATERAL LOW BACK PAIN WITH BILATERAL SCIATICA: ICD-10-CM

## 2023-07-06 DIAGNOSIS — M15.9 PRIMARY OSTEOARTHRITIS INVOLVING MULTIPLE JOINTS: ICD-10-CM

## 2023-07-06 DIAGNOSIS — M17.12 PRIMARY OSTEOARTHRITIS OF LEFT KNEE: ICD-10-CM

## 2023-07-06 DIAGNOSIS — G89.29 CHRONIC BILATERAL LOW BACK PAIN WITH BILATERAL SCIATICA: ICD-10-CM

## 2023-07-06 PROCEDURE — G8427 DOCREV CUR MEDS BY ELIG CLIN: HCPCS | Performed by: FAMILY MEDICINE

## 2023-07-06 PROCEDURE — G8417 CALC BMI ABV UP PARAM F/U: HCPCS | Performed by: FAMILY MEDICINE

## 2023-07-06 PROCEDURE — 99213 OFFICE O/P EST LOW 20 MIN: CPT | Performed by: FAMILY MEDICINE

## 2023-07-06 PROCEDURE — 1036F TOBACCO NON-USER: CPT | Performed by: FAMILY MEDICINE

## 2023-07-06 RX ORDER — MELOXICAM 15 MG/1
15 TABLET ORAL DAILY
Qty: 90 TABLET | Refills: 1 | Status: SHIPPED | OUTPATIENT
Start: 2023-07-06

## 2023-07-06 ASSESSMENT — ENCOUNTER SYMPTOMS
SHORTNESS OF BREATH: 0
COLOR CHANGE: 0
COUGH: 0
ABDOMINAL PAIN: 0
VOMITING: 0
NAUSEA: 0
WHEEZING: 0
CHEST TIGHTNESS: 0

## 2023-07-06 NOTE — PROGRESS NOTES
Ailyn Parra (: 1975) is a 50 y.o. female, Established patient, who presents today for:    Chief Complaint   Patient presents with    Follow-up     Pt states she is In pain  [facility]            ASSESSMENT/PLAN    1. Acute pain of left thigh  Comments:  Likely muscular injury. Patient given course of meloxicam and instructed to use heat/ice, elevation, and ACE wrap. Will discuss further with ortho  2. Primary osteoarthritis of left knee  Comments:  Patient to discuss any further management with ortho based on recent xray results. 3. Chronic bilateral low back pain with bilateral sciatica  Comments:  Pain management visit scheduled for further management. Orders:  -     meloxicam (MOBIC) 15 MG tablet; Take 1 tablet by mouth daily, Disp-90 tablet, R-1Normal  4. Primary osteoarthritis involving multiple joints  -     meloxicam (MOBIC) 15 MG tablet; Take 1 tablet by mouth daily, Disp-90 tablet, R-1Normal      Return for KEEP NEXT SCHEDULED VISIT 2023. SUBJECTIVE/OBJECTIVE:    HPI    Patient presents for ER follow-up. She was seen at [facility] on 2023 with reported left knee pain following injury sustained at home when walking down the stairs. Patient reported feeling a pulling sensation behind the knee going to the left thigh without any reported fall or trauma. She reported 6/10 severity pain behind the left knee radiating to the posterior thigh that was worse with standing or walking. Vital signs were documented to show /93, otherwise unremarkable. Physical exam was documented to show tenderness over the left posterior thigh. An x-ray of the left knee showed no acute bony abnormalities and signs of tricompartmental osteoarthritis with medial joint space narrowing and questionable minimal joint effusion. Patient was given an Ace wrap and advised to continue using rollator to help with ambulation.   She was instructed to use ice and elevation of the

## 2023-07-07 ENCOUNTER — HOSPITAL ENCOUNTER (OUTPATIENT)
Dept: OCCUPATIONAL THERAPY | Age: 48
Setting detail: THERAPIES SERIES
Discharge: HOME | End: 2023-07-07
Payer: COMMERCIAL

## 2023-07-07 PROCEDURE — 97110 THERAPEUTIC EXERCISES: CPT

## 2023-07-07 PROCEDURE — 97035 APP MDLTY 1+ULTRASOUND EA 15: CPT

## 2023-07-07 PROCEDURE — 97530 THERAPEUTIC ACTIVITIES: CPT

## 2023-07-07 ASSESSMENT — PAIN SCALES - GENERAL: PAINLEVEL_OUTOF10: 9

## 2023-07-07 NOTE — PROGRESS NOTES
Occupational Therapy: Daily Note   Patient: Abiel Rucker (76 y.o. female)   Examination Date:   Certification Period Expiration Date: 23    No data recorded   :  1975 # of Visits since [facility]:   Visit count could not be calculated. Make sure you are using a visit which is associated with an episode. MRN: [id number]  CSN: [id number] Start of Care Date:   No linked episodes   Insurance: Payor: [employer] / Plan: [employer] / Product Type: *No Product type* /   Insurance ID: [id number] - (Medicaid Managed) Secondary Insurance (if applicable): Insurance Information:     Referring Physician: MD Angelika Cook MD   PCP: Shaan Price MD Visits to Date/Visits Approved:     No Show/Cancelled Appts: 0      Medical Diagnosis: Carpal tunnel syndrome, right upper limb [G56.01]  Carpal tunnel syndrome, left upper limb [G56.02] Pt referred to outpatient occupational therapy d/t B carpal tunnel syndrome  Treatment Diagnosis: Decreased ADL/IADL performance d/t B carpal tunnel syndrome        SUBJECTIVE EXAMINATION   Pain Level: Pain Screening  Patient Currently in Pain: Yes  Pain Assessment: 0-10  Pain Level: 9    Patient Comments: Subjective: \"My hands are in a lot of pain. \"    HEP Compliance: Good  Previous treatments prior to current episode?: Outpatient OT     OBJECTIVE EXAMINATION   Restrictions: No data recorded        TREATMENT     Exercises: Therapeutic exercise (CPT 65600)   Treatment Reasoning    OT Exercise 1: X-soft putty hamilton and pincer grasp strengthening, pt tolerated x5 reps before citing severe fatigue and pain. OT Exercise 2: Pt completed two of each nerve glide- ulnar, median, radial nerve glides/slides with 5 second hold in each direction. Pt reported pain and tightness in neck and shoulder area. Limitations addressed:  Mobility, Strength, Coordination, Flexibility, Activity tolerance, Posture, Pain modulation  Therapist provided:

## 2023-07-12 ENCOUNTER — OFFICE VISIT (OUTPATIENT)
Dept: ORTHOPEDIC SURGERY | Age: 48
End: 2023-07-12
Payer: COMMERCIAL

## 2023-07-12 DIAGNOSIS — S76.312A HAMSTRING STRAIN, LEFT, INITIAL ENCOUNTER: Primary | ICD-10-CM

## 2023-07-12 PROCEDURE — 1036F TOBACCO NON-USER: CPT | Performed by: PHYSICIAN ASSISTANT

## 2023-07-12 PROCEDURE — 99214 OFFICE O/P EST MOD 30 MIN: CPT | Performed by: PHYSICIAN ASSISTANT

## 2023-07-12 PROCEDURE — G8427 DOCREV CUR MEDS BY ELIG CLIN: HCPCS | Performed by: PHYSICIAN ASSISTANT

## 2023-07-12 PROCEDURE — G8417 CALC BMI ABV UP PARAM F/U: HCPCS | Performed by: PHYSICIAN ASSISTANT

## 2023-07-12 RX ORDER — TOPIRAMATE 25 MG/1
25 TABLET ORAL 2 TIMES DAILY
COMMUNITY

## 2023-07-12 RX ORDER — DULOXETIN HYDROCHLORIDE 30 MG/1
30 CAPSULE, DELAYED RELEASE ORAL DAILY
COMMUNITY

## 2023-07-12 ASSESSMENT — ENCOUNTER SYMPTOMS
GASTROINTESTINAL NEGATIVE: 1
EYES NEGATIVE: 1
RESPIRATORY NEGATIVE: 1

## 2023-07-12 NOTE — PROGRESS NOTES
Margaret Cook (:  1975) is a 50 y.o. female,Established patient, here for evaluation of the following chief complaint(s):  New Patient (ED follow-up for left thigh pain, walking down stairs and felt a pop 2023)         ASSESSMENT/PLAN:  1. Hamstring strain, left, initial encounter      No follow-ups on file. Subjective   SUBJECTIVE/OBJECTIVE:  This is a 45-year-old complaining of left posterior thigh pain. She states 8 days prior she was walking and felt a tearing sensation posterior aspect of her left thigh. She was in the emergency department where x-rays show degenerative arthritis in the medial compartment the left knee. Patient denies any pain. States he was having difficulty placing her heel and foot flat on the floor due to posterior thigh pain. Review of Systems   Constitutional: Negative. HENT: Negative. Eyes: Negative. Respiratory: Negative. Gastrointestinal: Negative. Genitourinary: Negative. Musculoskeletal: Negative. Psychiatric/Behavioral: Negative. Objective   Prudence [employer]ed[employer], MD  [phone] 2023      Narrative & Impression  EXAMINATION:  THREE XRAY VIEWS OF THE LEFT KNEE     2023 6:24 pm     COMPARISON:  None. HISTORY:  ORDERING SYSTEM PROVIDED HISTORY: pain from left knee into femur  TECHNOLOGIST PROVIDED HISTORY:  Reason for exam:->pain from left knee into femur  What reading provider will be dictating this exam?->CRC     FINDINGS:  No acute bony abnormalities. Moderate tricompartmental osteoarthritis with  mild medial joint space narrowing. Mild suprapatellar soft tissue swelling. IMPRESSION:  No acute bony abnormalities. Tricompartmental osteoarthritis with mild medial joint space narrowing. Question minimal joint effusion. Specimen Collected: 23 18:53 EDT           Physical Exam  Musculoskeletal:      Comments: Left thigh-tenderness with palpation medial aspect of the mid thigh.   No palpable

## 2023-07-14 ENCOUNTER — HOSPITAL ENCOUNTER (OUTPATIENT)
Dept: OCCUPATIONAL THERAPY | Age: 48
Setting detail: THERAPIES SERIES
Discharge: HOME | End: 2023-07-14
Payer: COMMERCIAL

## 2023-07-14 ENCOUNTER — OFFICE VISIT (OUTPATIENT)
Dept: FAMILY MEDICINE CLINIC | Age: 48
End: 2023-07-14
Payer: COMMERCIAL

## 2023-07-14 VITALS
DIASTOLIC BLOOD PRESSURE: 80 MMHG | OXYGEN SATURATION: 98 % | HEART RATE: 73 BPM | WEIGHT: 293 LBS | HEIGHT: 65 IN | BODY MASS INDEX: 48.82 KG/M2 | SYSTOLIC BLOOD PRESSURE: 134 MMHG | TEMPERATURE: 97.5 F

## 2023-07-14 DIAGNOSIS — F51.01 PRIMARY INSOMNIA: ICD-10-CM

## 2023-07-14 DIAGNOSIS — E66.01 SEVERE OBESITY (BMI >= 40) (HCC): ICD-10-CM

## 2023-07-14 DIAGNOSIS — J45.40 MODERATE PERSISTENT ASTHMA WITHOUT COMPLICATION: ICD-10-CM

## 2023-07-14 DIAGNOSIS — Z12.11 COLON CANCER SCREENING: ICD-10-CM

## 2023-07-14 DIAGNOSIS — L03.115 BILATERAL LOWER LEG CELLULITIS: Primary | ICD-10-CM

## 2023-07-14 DIAGNOSIS — L03.116 BILATERAL LOWER LEG CELLULITIS: Primary | ICD-10-CM

## 2023-07-14 PROCEDURE — 97110 THERAPEUTIC EXERCISES: CPT

## 2023-07-14 PROCEDURE — 1036F TOBACCO NON-USER: CPT

## 2023-07-14 PROCEDURE — 97530 THERAPEUTIC ACTIVITIES: CPT

## 2023-07-14 PROCEDURE — G8417 CALC BMI ABV UP PARAM F/U: HCPCS

## 2023-07-14 PROCEDURE — G8427 DOCREV CUR MEDS BY ELIG CLIN: HCPCS

## 2023-07-14 PROCEDURE — 97140 MANUAL THERAPY 1/> REGIONS: CPT

## 2023-07-14 PROCEDURE — 99213 OFFICE O/P EST LOW 20 MIN: CPT

## 2023-07-14 RX ORDER — ZOLPIDEM TARTRATE 10 MG/1
10 TABLET ORAL NIGHTLY PRN
Qty: 90 TABLET | Refills: 0 | Status: SHIPPED | OUTPATIENT
Start: 2023-07-14 | End: 2023-10-12

## 2023-07-14 RX ORDER — SILVER/HYDROCOLLOID DRESSING 1.2%-2"X2"
2 BANDAGE TOPICAL DAILY
Qty: 60 EACH | Refills: 2 | Status: SHIPPED | OUTPATIENT
Start: 2023-07-14

## 2023-07-14 ASSESSMENT — 9 HOLE PEG TEST
TESTTIME_SECONDS: 22.7
TEST_RESULT: IMPAIRED
TEST_RESULT: IMPAIRED
TESTTIME_SECONDS: 22.7

## 2023-07-14 ASSESSMENT — PAIN SCALES - GENERAL: PAINLEVEL_OUTOF10: 9

## 2023-07-14 NOTE — PATIENT INSTRUCTIONS
Get labs drawn that have already been ordered for you. Start using Flovent inhaler 2 times daily to see if your rescue inhaler use can be reduced.

## 2023-07-14 NOTE — PROGRESS NOTES
Occupational Therapy: Sam/NAT   Patient: Bo Arellano (19 y.o. female)   Examination Date:   Plan of Care Certification Period: 2023 to 23      :  1975  MRN: [id number]  CSN: [id number]   Insurance: Payor: [employer] / Plan: Christopher Emma / Product Type: *No Product type* /   Insurance ID: [id number] - (Medicaid Managed) Secondary Insurance (if applicable): Insurance Information:     Referring Physician: MD Abbie Cifuentes MD   PCP: Chuy Andrade MD Visits to Date/Visits Approved:       No Show/Cancelled Appts:      Medical Diagnosis: Carpal tunnel syndrome, right upper limb [G56.01]  Carpal tunnel syndrome, left upper limb [G56.02] Pt referred to outpatient occupational therapy d/t B carpal tunnel syndrome  Treatment Diagnosis: Decreased ADL/IADL performance d/t B carpal tunnel syndrome         PERTINENT MEDICAL HISTORY   Patient assessed for rehabilitation services?: Yes  Self reported health status[de-identified] Fair    Medical History: Chart Reviewed: Yes   Past Medical History:   Diagnosis Date    Anxiety     Asthma     Fibromyalgia 2023    GERD (gastroesophageal reflux disease) 2023    History of substance abuse ([address]) 10/10/2019    Perennial allergic rhinitis 10/10/2019    Primary osteoarthritis involving multiple joints 10/10/2019    Rupture of Achilles tendon 2018    Rupture of gastrocnemius muscle 2018     Surgical History:   Past Surgical History:   Procedure Laterality Date    TUBAL LIGATION         Medications:   Current Outpatient Medications:     Silver-Carboxymethylcellulose (AQUACEL AG ADVANTAGE) 4\"X5\" PADS, Apply 2 each topically daily, Disp: 60 each, Rfl: 2    zolpidem (AMBIEN) 10 MG tablet, Take 1 tablet by mouth nightly as needed for Sleep for up to 90 days.  Max Daily Amount: 10 mg, Disp: 90 tablet, Rfl: 0    DULoxetine (CYMBALTA) 30 MG extended release capsule, Take 1 capsule by mouth daily, Disp: , Rfl:

## 2023-07-15 PROBLEM — L03.115 BILATERAL LOWER LEG CELLULITIS: Status: ACTIVE | Noted: 2023-07-15

## 2023-07-15 PROBLEM — L03.116 BILATERAL LOWER LEG CELLULITIS: Status: ACTIVE | Noted: 2023-07-15

## 2023-07-15 ASSESSMENT — ENCOUNTER SYMPTOMS
SHORTNESS OF BREATH: 0
DIARRHEA: 0
COLOR CHANGE: 0
COUGH: 0
CHEST TIGHTNESS: 0
NAUSEA: 0

## 2023-07-20 ENCOUNTER — OFFICE VISIT (OUTPATIENT)
Dept: ORTHOPEDIC SURGERY | Age: 48
End: 2023-07-20

## 2023-07-20 VITALS — BODY MASS INDEX: 48.82 KG/M2 | HEIGHT: 65 IN | WEIGHT: 293 LBS

## 2023-07-20 DIAGNOSIS — G56.01 RIGHT CARPAL TUNNEL SYNDROME: Primary | ICD-10-CM

## 2023-07-20 DIAGNOSIS — G56.02 LEFT CARPAL TUNNEL SYNDROME: ICD-10-CM

## 2023-07-20 ASSESSMENT — ENCOUNTER SYMPTOMS
ALLERGIC/IMMUNOLOGIC NEGATIVE: 1
GASTROINTESTINAL NEGATIVE: 1
EYES NEGATIVE: 1
RESPIRATORY NEGATIVE: 1

## 2023-07-20 NOTE — PROGRESS NOTES
Cephalexin, Methylphenidate, Neosporin [neomycin-polymyxin-gramicidin], Tramadol, and Voltaren [diclofenac sodium] Latex Allergies: _____  Diabetic: _____  [] IV ________________________  [x] IV Start with J-loop     Preprinted Orders: Attached [] Yes [] No   Antibiotic Pre-Op: [x] ANCEF 2 gram IVPB if > 120 kg 3 grams IVPB within 1 hr. of Incision, if Allergic, use Vancomycin 1 gram IV, 2 hrs.  Pre-op  [] TXA Protocol [] Other:   [x] NPO   [] Betablocker (if needed) _____________________________________   [] Knee high anti-embolic hose [] Thigh high anti-embolic hose   Other: ______________________________________________________    Physician Signature Required:  Date/Time: 7/20/2023

## 2023-07-24 ENCOUNTER — PREP FOR PROCEDURE (OUTPATIENT)
Dept: ORTHOPEDIC SURGERY | Age: 48
End: 2023-07-24

## 2023-07-28 ENCOUNTER — HOSPITAL ENCOUNTER (OUTPATIENT)
Dept: PHYSICAL THERAPY | Age: 48
Setting detail: THERAPIES SERIES
Discharge: HOME | End: 2023-07-28
Payer: COMMERCIAL

## 2023-07-28 PROCEDURE — 97162 PT EVAL MOD COMPLEX 30 MIN: CPT

## 2023-07-28 PROCEDURE — 97110 THERAPEUTIC EXERCISES: CPT

## 2023-07-28 PROCEDURE — 97140 MANUAL THERAPY 1/> REGIONS: CPT

## 2023-07-28 ASSESSMENT — PAIN DESCRIPTION - LOCATION: LOCATION: BACK;HIP;COCCYX

## 2023-07-28 ASSESSMENT — PAIN SCALES - GENERAL: PAINLEVEL_OUTOF10: 10

## 2023-07-28 ASSESSMENT — PAIN DESCRIPTION - ORIENTATION: ORIENTATION: RIGHT;LEFT

## 2023-07-28 ASSESSMENT — PAIN DESCRIPTION - PAIN TYPE: TYPE: CHRONIC PAIN

## 2023-07-28 NOTE — PROGRESS NOTES
mouth daily, Disp: 90 tablet, Rfl: 1    OXcarbazepine (TRILEPTAL) 300 MG tablet, Take 1 tablet by mouth every morning, Disp: , Rfl:     fluticasone (FLOVENT HFA) 110 MCG/ACT inhaler, Inhale 2 puffs into the lungs 2 times daily, Disp: 12 g, Rfl: 5    pregabalin (LYRICA) 100 MG capsule, , Disp: , Rfl:     levothyroxine (SYNTHROID) 88 MCG tablet, Take 1 tablet by mouth Daily, Disp: 90 tablet, Rfl: 1    ferrous sulfate (SLOW FE) 142 (45 Fe) MG extended release tablet, Take 142 mg by mouth daily, Disp: 90 tablet, Rfl: 1    cetirizine (ZYRTEC) 10 MG tablet, Take 1 tablet by mouth daily, Disp: 90 tablet, Rfl: 1    montelukast (SINGULAIR) 10 MG tablet, Take 1 tablet by mouth nightly, Disp: 90 tablet, Rfl: 1    albuterol sulfate HFA (PROAIR HFA) 108 (90 Base) MCG/ACT inhaler, Inhale 2 puffs into the lungs every 6 hours as needed for Wheezing or Shortness of Breath, Disp: 18 g, Rfl: 0    cyclobenzaprine (FLEXERIL) 10 MG tablet, TAKE 1 TABLET BY MOUTH 3 TIMES A DAY AS NEEDED FOR MUSCLE SPASM FOR 15 DAYS **LIMIT EXCEEDED**, Disp: , Rfl:     hydrOXYzine pamoate (VISTARIL) 100 MG capsule, TAKE 1 CAPSULE BY MOUTH THREE TIMES A DAY AS DIRECTED FOR 30 DAYS, Disp: , Rfl:     diazePAM (VALIUM) 5 MG tablet, TAKE 1 TABLET BY MOUTH TWICE A DAY AS NEEDED FOR 7 DAYS, Disp: , Rfl:     omeprazole (PRILOSEC) 40 MG delayed release capsule, Take 1 capsule by mouth every morning (before breakfast), Disp: 180 capsule, Rfl: 1    nystatin (MYCOSTATIN) 571216 UNIT/GM powder, Apply topically 3 times daily, Disp: 60 g, Rfl: 2    ipratropium (ATROVENT) 0.06 % nasal spray, 2 sprays by Nasal route 3 times daily, Disp: 1 Bottle, Rfl: 0    paliperidone (INVEGA) 3 MG extended release tablet, Take 1 tablet by mouth nightly, Disp: , Rfl:     sertraline (ZOLOFT) 100 MG tablet, Take 1.5 tablets by mouth daily, Disp: , Rfl:     OXcarbazepine (TRILEPTAL) 600 MG tablet, Take 1 tablet by mouth 2 times daily, Disp: , Rfl:     Elastic Bandages & Supports (WRIST

## 2023-08-02 ENCOUNTER — HOSPITAL ENCOUNTER (OUTPATIENT)
Dept: PHYSICAL THERAPY | Age: 48
Setting detail: THERAPIES SERIES
Discharge: HOME | End: 2023-08-02
Payer: COMMERCIAL

## 2023-08-02 PROCEDURE — 97530 THERAPEUTIC ACTIVITIES: CPT

## 2023-08-02 PROCEDURE — 97110 THERAPEUTIC EXERCISES: CPT

## 2023-08-02 ASSESSMENT — PAIN DESCRIPTION - PAIN TYPE: TYPE: CHRONIC PAIN

## 2023-08-02 ASSESSMENT — PAIN SCALES - GENERAL: PAINLEVEL_OUTOF10: 9

## 2023-08-02 ASSESSMENT — PAIN DESCRIPTION - LOCATION: LOCATION: GENERALIZED

## 2023-08-02 NOTE — PROGRESS NOTES
6/10 pain in LE and serina back for >= 25% of day to allwo better functional mobility. Pt able to complete >= 200ft of walking with ROllaotr with more upright posture an dhand not forearms on arms of Rollator with pain post < = 6/10 in bakc and LE       Increase LE strength to tolerate mod resistance of hip in sitting >= 4/5       Decrease Oswestry LBP scale for 76% disability to <= 40% disabilty and modified LEFS lower extremity funcitoanl scale from 84% to <= 50% to show increased funcitona dn any decrease pain in LEs and low back.  Oswestry 38/50= 76%, modified LEFS 10/64= 84% disability     Pt competent advanced HEP for discharge                                                    TREATMENT PLAN   Plan Frequency: 1-2x week  Plan weeks: 4-5 weeks or 10 visits  Current Treatment Recommendations: Strengthening, ROM, Balance training, Gait training, Functional mobility training, Transfer training, Pain management, Home exercise program, Positioning, Safety education & training, Therapeutic activities, Modalities, Patient/Caregiver education & training, Endurance training  Modalities: E-stim - unattended, Heat/Cold   Additional Comments: KT tape for hip, knee, back, posture       Therapy Time  Individual Time In:     6674    Individual Time Out:  130  Minutes:  45        Electronically signed by Latisha Kaplan PTA  on 6/1/3011 at 1:40 PM   POC NOTE

## 2023-08-04 ENCOUNTER — HOSPITAL ENCOUNTER (OUTPATIENT)
Dept: PHYSICAL THERAPY | Age: 48
Setting detail: THERAPIES SERIES
Discharge: HOME | End: 2023-08-04
Payer: COMMERCIAL

## 2023-08-04 PROCEDURE — 97140 MANUAL THERAPY 1/> REGIONS: CPT

## 2023-08-04 PROCEDURE — 97110 THERAPEUTIC EXERCISES: CPT

## 2023-08-04 ASSESSMENT — PAIN SCALES - GENERAL: PAINLEVEL_OUTOF10: 9

## 2023-08-04 ASSESSMENT — PAIN DESCRIPTION - ORIENTATION: ORIENTATION: LOWER

## 2023-08-04 ASSESSMENT — PAIN DESCRIPTION - LOCATION: LOCATION: BACK

## 2023-08-04 ASSESSMENT — PAIN DESCRIPTION - PAIN TYPE: TYPE: CHRONIC PAIN

## 2023-08-04 ASSESSMENT — PAIN DESCRIPTION - DESCRIPTORS: DESCRIPTORS: SORE;ACHING;SHARP

## 2023-08-04 NOTE — PROGRESS NOTES
tall seated LAQ x 10 hold 2-3 sec alternating  Exercise 11: tall seated mid Rows RTB x 10 VC for form  Exercise 12: tall seated B shd ext RTB x 10 VC for form                          Gait: (CPT J5158760)  Treatment Reasoning    Gait Training 1: Pt. ambulated 58' with Rollator approx 40' standing tall hands on Rollator then with inc pain/pressure R wrist leaning R forearm onto Rollator and trunk lean to R. Mild SOB RPE post 12-14                     Manual Therapy: (CPT 78488) Treatment Reasoning     Soft Tissue Mobilizaton: STM to lumbosacral area in prone to dec pain and tightness tighter R >L Limitations addressed: Joint motion, Painful spasm, Tissue extensibility                    Pt Education: Additional Comments: KT tape for hip, knee, back, posture       ASSESSMENT     Assessment: Assessment: Pt. is trying hard during hterapy. SItting tall with B feet supported on 4\" box. Tolerates addidtional reps with BLE strneghteing and added posutral strenghtening with RTB. Encouraged short frequent wlks at home to inc endurance and strength. VC for breathing technique as ambulation of 58' with moderately difficult for patient according to RPE chart. Pain \"slightly less than 9, but not an 8\" post session. Pt. declined CP. Body Structures, Functions, Activity Limitations Requiring Skilled Therapeutic Intervention: Decreased functional mobility , Decreased strength, Decreased endurance, Decreased posture, Increased pain, Decreased body mechanics, Decreased tolerance to work activity    Post-Treatment Pain Level: Activity Tolerance: Patient limited by pain    Therapy Prognosis: Fair       GOALS   Patient Goals : \"I want to be able to stand and walk better because my pain is less in my back and legs. \"  Short Term Goals Completed by 1-2 weeks Current Status Goal Status   Pt reporting any decrease in pain in back and or LE with HEP       Pt able to stand unsupported >= 45 seconds before not tolerated due to pain

## 2023-08-09 ENCOUNTER — HOSPITAL ENCOUNTER (OUTPATIENT)
Dept: PHYSICAL THERAPY | Age: 48
Setting detail: THERAPIES SERIES
Discharge: HOME | End: 2023-08-09
Payer: COMMERCIAL

## 2023-08-09 ENCOUNTER — OFFICE VISIT (OUTPATIENT)
Dept: ORTHOPEDIC SURGERY | Age: 48
End: 2023-08-09
Payer: COMMERCIAL

## 2023-08-09 DIAGNOSIS — G56.01 RIGHT CARPAL TUNNEL SYNDROME: ICD-10-CM

## 2023-08-09 DIAGNOSIS — S76.312A HAMSTRING STRAIN, LEFT, INITIAL ENCOUNTER: Primary | ICD-10-CM

## 2023-08-09 PROCEDURE — 97140 MANUAL THERAPY 1/> REGIONS: CPT

## 2023-08-09 PROCEDURE — G8427 DOCREV CUR MEDS BY ELIG CLIN: HCPCS | Performed by: PHYSICIAN ASSISTANT

## 2023-08-09 PROCEDURE — 99214 OFFICE O/P EST MOD 30 MIN: CPT | Performed by: PHYSICIAN ASSISTANT

## 2023-08-09 PROCEDURE — 1036F TOBACCO NON-USER: CPT | Performed by: PHYSICIAN ASSISTANT

## 2023-08-09 PROCEDURE — G8417 CALC BMI ABV UP PARAM F/U: HCPCS | Performed by: PHYSICIAN ASSISTANT

## 2023-08-09 PROCEDURE — 97110 THERAPEUTIC EXERCISES: CPT

## 2023-08-09 RX ORDER — SODIUM CHLORIDE 0.9 % (FLUSH) 0.9 %
5-40 SYRINGE (ML) INJECTION EVERY 12 HOURS SCHEDULED
OUTPATIENT
Start: 2023-08-09

## 2023-08-09 RX ORDER — SODIUM CHLORIDE 9 MG/ML
INJECTION, SOLUTION INTRAVENOUS PRN
OUTPATIENT
Start: 2023-08-09

## 2023-08-09 RX ORDER — SODIUM CHLORIDE, SODIUM LACTATE, POTASSIUM CHLORIDE, CALCIUM CHLORIDE 600; 310; 30; 20 MG/100ML; MG/100ML; MG/100ML; MG/100ML
INJECTION, SOLUTION INTRAVENOUS CONTINUOUS
OUTPATIENT
Start: 2023-08-09

## 2023-08-09 RX ORDER — SODIUM CHLORIDE 0.9 % (FLUSH) 0.9 %
5-40 SYRINGE (ML) INJECTION PRN
OUTPATIENT
Start: 2023-08-09

## 2023-08-09 ASSESSMENT — ENCOUNTER SYMPTOMS
GASTROINTESTINAL NEGATIVE: 1
RESPIRATORY NEGATIVE: 1
EYES NEGATIVE: 1

## 2023-08-09 NOTE — PROGRESS NOTES
seated sidebody stretch 10 sec x 3  Exercise 8: seated trunk flexion stretch at Rollator 20-30 sec x 3  Exercise 9: tall seated HS stretch 10 seconds x 5. Exercise 10: tall seated LAQ x 10 hold 2-3 sec alternating  Exercise 11: tall seated mid Rows RTB x 10 VC for form  Exercise 12: tall seated B shd ext RTB x 10 VC for form  Exercise 13: Sit to stand x 5 reps without UE support. Functional ability(s) targeted: Ambulating community distances, Transfers                     Manual Therapy: (CPT E2159690) Treatment Reasoning     Joint Mobilization: gentle grade 1 and 2 thoracic in prone. Deferred lumbar region PA mobs due to increase pain with palpation during gentle STM to paraspinals. Manual Traction: Prone distraction with each long leg 10 sec hold x 3, pt. reports relief at hips. Soft Tissue Mobilizaton: STM to lumbosacral area in prone to dec pain and tightness tighter R >L Increase pain reported around lumabr region. Limitations addressed: Joint motion, Painful spasm, Tissue extensibility                    Pt Education: Additional Comments: KT tape for hip, knee, back, posture       ASSESSMENT     Assessment: Assessment: Pt. demo's good effort towards attempting to increase postural correction awareness. Pt. reports still has a hard time maintaining postural correction; however, able to correct for limited time. Pt. toilerated and completed current ther ex well. Added sit to stand to HEP to assist with strengthening. Pt. tender with c/o increase pain in lumbar regioin with STM in prone. Focus on Thoracic PA mobs and long leg distraction trialed. Pt. reports temporary relief with long leg distraction in prone at hips. Educated to continue HEP with adding sit to stand. Reviewed and gave pt. RTB to perform buffy sit core strength with UE therabands.   Body Structures, Functions, Activity Limitations Requiring Skilled Therapeutic Intervention: Decreased functional mobility , Decreased strength, Decreased endurance,

## 2023-08-09 NOTE — PROGRESS NOTES
Maryellen Ponce (:  1975) is a 50 y.o. female,Established patient, here for evaluation of the following chief complaint(s):  Follow-up (Follow up visit for Hamstring strain, left, states LT Hamstring is better)         ASSESSMENT/PLAN:  1. Hamstring strain, left, initial encounter  2. Right carpal tunnel syndrome      No follow-ups on file. Subjective   SUBJECTIVE/OBJECTIVE:  This 54-year-old female following up for complaint of left hamstring strain. She states she is having no pain in the left posterior thigh. She is using a walker due to her chronic low back pain and knee arthritis. Review of Systems   Constitutional: Negative. HENT: Negative. Eyes: Negative. Respiratory: Negative. Gastrointestinal: Negative. Genitourinary: Negative. Musculoskeletal: Negative. Psychiatric/Behavioral: Negative. Objective   Physical Exam  Musculoskeletal:      Comments: Left thigh-no ecchymosis. Nontender with palpation. Good flexion range of motion of the left knee. Calf is soft and nontender. Sensation is intact distally. Patient is doing well concerning her left thigh. She is scheduled for right carpal tunnel surgery 2023. An electronic signature was used to authenticate this note.     --NOEMI Wiseman

## 2023-08-09 NOTE — H&P
Subjective:     Patient is a 50 y.o.  female presented with a history of right hand numbness. Onset of symptoms was gradual 3 years ago with gradually worsening course since that time. She is being admitted for surgical management of this condition. The indications for the procedure include EMG study showing median nerve compression in the right hand. .    Patient Active Problem List    Diagnosis Date Noted    Bilateral lower leg cellulitis 07/15/2023    GERD (gastroesophageal reflux disease) 04/19/2023    Fibromyalgia 04/19/2023    Bilateral carpal tunnel syndrome 04/20/2020    Vitamin D deficiency 01/13/2020    Moderate persistent asthma without complication 84/85/0820    Bilateral dry eyes 10/30/2019    Floaters, bilateral 10/30/2019    Primary osteoarthritis involving multiple joints 10/10/2019    Perennial allergic rhinitis 10/10/2019    History of substance abuse ([address]) 10/10/2019    Iron deficiency anemia 10/10/2019    Family history of breast cancer 10/10/2019    Anxiety and depression 08/02/2019    Chronic bilateral low back pain with bilateral sciatica 08/02/2019    Hypothyroidism 08/02/2019    Migraine without aura and without status migrainosus, not intractable 08/02/2019    Morbid obesity due to excess calories ([address]) 12/04/2003     Past Medical History:   Diagnosis Date    Anxiety     Asthma     Fibromyalgia 4/19/2023    GERD (gastroesophageal reflux disease) 4/19/2023    History of substance abuse ([address]) 10/10/2019    Perennial allergic rhinitis 10/10/2019    Primary osteoarthritis involving multiple joints 10/10/2019    Rupture of Achilles tendon 11/29/2018    Rupture of gastrocnemius muscle 11/29/2018      Past Surgical History:   Procedure Laterality Date    TUBAL LIGATION  2003      Not in a hospital admission.   Allergies   Allergen Reactions    Gabapentin      AMS, suicidal     Hydroxyzine Hcl Hives    Iodinated Contrast Media Hives    Iv Contrast [Iodides]     Lamictal [Lamotrigine]

## 2023-08-10 ENCOUNTER — TELEPHONE (OUTPATIENT)
Dept: ORTHOPEDIC SURGERY | Age: 48
End: 2023-08-10

## 2023-08-11 ENCOUNTER — OFFICE VISIT (OUTPATIENT)
Dept: FAMILY MEDICINE CLINIC | Age: 48
End: 2023-08-11
Payer: COMMERCIAL

## 2023-08-11 ENCOUNTER — HOSPITAL ENCOUNTER (OUTPATIENT)
Dept: PHYSICAL THERAPY | Age: 48
Setting detail: THERAPIES SERIES
Discharge: HOME | End: 2023-08-11
Payer: COMMERCIAL

## 2023-08-11 VITALS
DIASTOLIC BLOOD PRESSURE: 86 MMHG | WEIGHT: 293 LBS | OXYGEN SATURATION: 98 % | TEMPERATURE: 97.7 F | SYSTOLIC BLOOD PRESSURE: 134 MMHG | HEART RATE: 83 BPM | HEIGHT: 65 IN | BODY MASS INDEX: 48.82 KG/M2

## 2023-08-11 DIAGNOSIS — J45.40 MODERATE PERSISTENT ASTHMA WITHOUT COMPLICATION: Primary | ICD-10-CM

## 2023-08-11 DIAGNOSIS — M54.42 CHRONIC BILATERAL LOW BACK PAIN WITH BILATERAL SCIATICA: ICD-10-CM

## 2023-08-11 DIAGNOSIS — E03.9 HYPOTHYROIDISM, UNSPECIFIED TYPE: ICD-10-CM

## 2023-08-11 DIAGNOSIS — M54.41 CHRONIC BILATERAL LOW BACK PAIN WITH BILATERAL SCIATICA: ICD-10-CM

## 2023-08-11 DIAGNOSIS — G89.29 CHRONIC BILATERAL LOW BACK PAIN WITH BILATERAL SCIATICA: ICD-10-CM

## 2023-08-11 DIAGNOSIS — K21.9 GASTROESOPHAGEAL REFLUX DISEASE, UNSPECIFIED WHETHER ESOPHAGITIS PRESENT: ICD-10-CM

## 2023-08-11 DIAGNOSIS — L56.4 POLYMORPHIC LIGHT ERUPTION: ICD-10-CM

## 2023-08-11 DIAGNOSIS — M15.9 PRIMARY OSTEOARTHRITIS INVOLVING MULTIPLE JOINTS: ICD-10-CM

## 2023-08-11 PROCEDURE — 97110 THERAPEUTIC EXERCISES: CPT

## 2023-08-11 PROCEDURE — G8427 DOCREV CUR MEDS BY ELIG CLIN: HCPCS

## 2023-08-11 PROCEDURE — 99214 OFFICE O/P EST MOD 30 MIN: CPT

## 2023-08-11 PROCEDURE — 1036F TOBACCO NON-USER: CPT

## 2023-08-11 PROCEDURE — 97140 MANUAL THERAPY 1/> REGIONS: CPT

## 2023-08-11 PROCEDURE — G8417 CALC BMI ABV UP PARAM F/U: HCPCS

## 2023-08-11 RX ORDER — MONTELUKAST SODIUM 10 MG/1
10 TABLET ORAL NIGHTLY
Qty: 90 TABLET | Refills: 1 | Status: SHIPPED | OUTPATIENT
Start: 2023-08-11

## 2023-08-11 RX ORDER — CETIRIZINE HYDROCHLORIDE 10 MG/1
10 TABLET ORAL DAILY
Qty: 90 TABLET | Refills: 1 | Status: SHIPPED | OUTPATIENT
Start: 2023-08-11

## 2023-08-11 RX ORDER — CLOBETASOL PROPIONATE 0.5 MG/G
CREAM TOPICAL
Qty: 30 G | Refills: 0 | Status: SHIPPED | OUTPATIENT
Start: 2023-08-11

## 2023-08-11 RX ORDER — FLUTICASONE PROPIONATE AND SALMETEROL XINAFOATE 115; 21 UG/1; UG/1
2 AEROSOL, METERED RESPIRATORY (INHALATION) 2 TIMES DAILY
Qty: 1 EACH | Refills: 3 | Status: SHIPPED | OUTPATIENT
Start: 2023-08-11

## 2023-08-11 RX ORDER — LEVOTHYROXINE SODIUM 88 UG/1
88 TABLET ORAL DAILY
Qty: 90 TABLET | Refills: 1 | Status: SHIPPED | OUTPATIENT
Start: 2023-08-11

## 2023-08-11 RX ORDER — OMEPRAZOLE 40 MG/1
40 CAPSULE, DELAYED RELEASE ORAL
Qty: 180 CAPSULE | Refills: 1 | Status: SHIPPED | OUTPATIENT
Start: 2023-08-11

## 2023-08-11 RX ORDER — ALBUTEROL SULFATE 90 UG/1
2 AEROSOL, METERED RESPIRATORY (INHALATION) EVERY 6 HOURS PRN
Qty: 18 G | Refills: 0 | Status: SHIPPED | OUTPATIENT
Start: 2023-08-11

## 2023-08-11 RX ORDER — MELOXICAM 15 MG/1
15 TABLET ORAL DAILY
Qty: 90 TABLET | Refills: 1 | Status: SHIPPED | OUTPATIENT
Start: 2023-08-11

## 2023-08-11 ASSESSMENT — ENCOUNTER SYMPTOMS
WHEEZING: 1
ALLERGIC/IMMUNOLOGIC NEGATIVE: 1
COUGH: 0
SHORTNESS OF BREATH: 0
EYES NEGATIVE: 1
GASTROINTESTINAL NEGATIVE: 1

## 2023-08-11 NOTE — PROGRESS NOTES
Physical Therapy  Physical Therapy: Daily Note   Patient: Margaret Cook (49 y.o. female)   Examination Date:   Plan of Care/Certification Expiration Date: 23    No data recorded   :  1975 # of Visits since [facility]:   5   MRN: [id number]  CSN: [id number] Start of Care Date:   2023   Insurance: Payor:  [employer] / Plan: [employer] / Product Type: *No Product type* /   Insurance ID: [id number] - (Medicaid Managed) Secondary Insurance (if applicable):    Referring Physician: MD Dr Love Patel   PCP: GERARDO Miguel CNP Visits to Date/Visits Approved: 5 / 10    No Show/Cancelled Appts: 0 / 0     Medical Diagnosis: Carpal tunnel syndrome, right upper limb [G56.01]  Carpal tunnel syndrome, left upper limb [G56.02] LBP, BLE pain, BLE weakness  Treatment Diagnosis: LBP , BLE pain and BLE weakness with decreased functional mobility        SUBJECTIVE EXAMINATION   Pain Level:      Patient Comments: Subjective: My tailbone hurts 10/10 today. I dont know why my tailbone is hurting. I will not go to the emergency room unless I am crying or bleeding. I pretty much laid down yesterday because how bad I felt but when I have PT I will be here. The day I do not show up for PT is the day I go to emergency room.     HEP Compliance: Poor        OBJECTIVE EXAMINATION   Restrictions:  No data recorded Required Braces or Orthoses?: -- (Recommended B wrist splints)   No data recorded              TREATMENT     Exercises:      Treatment Reasoning    Exercise 2: tall sit deep breathing in nose 3-5 count and out mouth 3-5 count x 10  Exercise 3: tall sit 2 minutes without back support  Exercise 4: tall sit leaning fwd on Rollator alternate arm lifts to tolerated height x 10 reps  Exercise 5: tall sit scap retractions x 10 hold 3 sec  Exercise 6: tall seated alt march cues for core engagement and not to lean back x 10 hold 2-3 sec alternating  Exercise 7: seated sidebody stretch 10

## 2023-08-11 NOTE — PROGRESS NOTES
Geraldine Rivas (: 1975) is a 50 y.o. female, Established patient, who presents today for:    Chief Complaint   Patient presents with    Follow-up     4 week f/u        Follow-up of the following chronic conditions. Moderate persistent asthma, hypothyroidism, acid reflux. reports improve symptoms of wheezing and shortness of breath since increasing Flovent to 2 puffs twice daily. She reports she is still using her albuterol inhaler 3 days out of the week consistently. ASSESSMENT/PLAN    1. Moderate persistent asthma without complication  Patient denies ,chest pain, increase in fatigue, or productive cough. Reports having intermittent wheezing use of albuterol rescue inhaler approximately 3 times weekly. We will discontinue Flovent inhaler at this time start Advair 2 puffs twice daily.        -   Advair -21 MCG 2 puffs twice daily  -     albuterol sulfate HFA (PROAIR HFA) 108 (90 Base) MCG/ACT inhaler; Inhale 2 puffs into the lungs every 6 hours as needed for Wheezing or Shortness of Breath, Disp-18 g, R-0Normal  -     montelukast (SINGULAIR) 10 MG tablet; Take 1 tablet by mouth nightly, Disp-90 tablet, R-1Normal  -CBC with differential  -Comprehensive metabolic panel  -T4 free  -TSH  2. Polymorphic light eruption   Patient reports prior history of rash with exposure to sunlight. Currently rash papular itching to the left forearm and bilateral ankles. Advised patient stay out of sunlight wear long sleeves and long pants. -     clobetasol (TEMOVATE) 0.05 % cream; Apply topically 2 times daily. , Disp-30 g, R-0, Normal  3. Hypothyroidism, unspecified type  -     levothyroxine (SYNTHROID) 88 MCG tablet; Take 1 tablet by mouth Daily, Disp-90 tablet, R-1Normal  4. Chronic bilateral low back pain with bilateral sciatica  -     meloxicam (MOBIC) 15 MG tablet; Take 1 tablet by mouth daily, Disp-90 tablet, R-1Normal  5.  Primary osteoarthritis involving multiple joints  -     meloxicam (MOBIC) 15 MG

## 2023-08-14 ENCOUNTER — TELEPHONE (OUTPATIENT)
Dept: ORTHOPEDIC SURGERY | Age: 48
End: 2023-08-14

## 2023-08-14 RX ORDER — POLYETHYLENE GLYCOL 3350, SODIUM CHLORIDE, SODIUM BICARBONATE, POTASSIUM CHLORIDE 420; 11.2; 5.72; 1.48 G/4L; G/4L; G/4L; G/4L
4000 POWDER, FOR SOLUTION ORAL ONCE
Qty: 4000 ML | Refills: 0 | Status: SHIPPED | OUTPATIENT
Start: 2023-08-14 | End: 2023-08-14

## 2023-08-14 NOTE — TELEPHONE ENCOUNTER
Patient has to cancel surgery, she is now living out of her car and has no where to stay for her after care, will reschedule at a later time

## 2023-08-16 ENCOUNTER — APPOINTMENT (OUTPATIENT)
Dept: PHYSICAL THERAPY | Age: 48
End: 2023-08-16
Payer: COMMERCIAL

## 2023-08-18 ENCOUNTER — HOSPITAL ENCOUNTER (OUTPATIENT)
Dept: PHYSICAL THERAPY | Age: 48
Setting detail: THERAPIES SERIES
Discharge: HOME | End: 2023-08-18
Payer: COMMERCIAL

## 2023-08-18 PROCEDURE — 97140 MANUAL THERAPY 1/> REGIONS: CPT

## 2023-08-18 PROCEDURE — 97110 THERAPEUTIC EXERCISES: CPT

## 2023-08-18 ASSESSMENT — PAIN DESCRIPTION - PAIN TYPE: TYPE: CHRONIC PAIN

## 2023-08-18 ASSESSMENT — PAIN SCALES - GENERAL: PAINLEVEL_OUTOF10: 10

## 2023-08-18 ASSESSMENT — PAIN DESCRIPTION - ORIENTATION: ORIENTATION: LOWER;LEFT;RIGHT

## 2023-08-18 ASSESSMENT — PAIN DESCRIPTION - LOCATION: LOCATION: BACK;LEG

## 2023-08-18 NOTE — PROGRESS NOTES
Physical Therapy: Daily Note   Patient: Elliott Heaton (19 y.o. female)   Examination Date:   Plan of Care/Certification Expiration Date: 23    No data recorded   :  1975 # of Visits since [facility]:   Visit count could not be calculated. Make sure you are using a visit which is associated with an episode. MRN: [id number]  CSN: [id number] Start of Care Date:   No linked episodes   Insurance: Payor: [facility] / Plan: [address] / Product Type: *No Product type* /   Insurance ID: [id number] - (Medicaid Managed) Secondary Insurance (if applicable):    Referring Physician: MD Dr Jenni Ritchie   PCP: GERARDO Hurtado CNP Visits to Date/Visits Approved: 6 / 10    No Show/Cancelled Appts:      Medical Diagnosis: Carpal tunnel syndrome, right upper limb [G56.01]  Carpal tunnel syndrome, left upper limb [G56.02]    Treatment Diagnosis: LBP , BLE pain and BLE weakness with decreased functional mobility        SUBJECTIVE EXAMINATION   Pain Level: Pain Screening  Patient Currently in Pain: Yes  Pain Assessment: 0-10  Pain Level: 10  Best Pain Level: 8  Worst Pain Level: 10  Post Treatment Pain Level: 7  Pain Type: Chronic pain  Pain Location: Back, Leg  Pain Orientation: Lower, Left, Right    Patient Comments: Subjective: Pt. reported that she did have a stomach bug which casued her N/S.  SHe is also a \"10/10\" hurting in the LB and all over the body. HEP Compliance: Good        OBJECTIVE EXAMINATION   Restrictions:  No data recorded Required Braces or Orthoses?: -- (Recommended B wrist splints)   No data recorded              TREATMENT     Exercises:      Treatment Reasoning    Exercise 1: tall sit shoulder capital D stretch x 10 reps. Muscles in back get tighten in back tolerating ~ 7 reps.   Exercise 2: tall sit deep breathing in nose 3-5 count and out mouth 3-5 count x 10  Exercise 3: tall sit 2 minutes without back support  Exercise 5: tall sit scap retractions

## 2023-08-21 ENCOUNTER — TELEPHONE (OUTPATIENT)
Dept: FAMILY MEDICINE CLINIC | Age: 48
End: 2023-08-21

## 2023-08-21 NOTE — TELEPHONE ENCOUNTER
Patient states that Shilpa Sky from the [employer] has been calling to verify information for the patient's living accommodations and has been unable to get a return call. Please call [facility] ASAP.      Ph: [phone]

## 2023-08-22 ENCOUNTER — HOSPITAL ENCOUNTER (OUTPATIENT)
Dept: PHYSICAL THERAPY | Age: 48
Setting detail: THERAPIES SERIES
Discharge: HOME | End: 2023-08-22
Payer: COMMERCIAL

## 2023-08-22 ENCOUNTER — HOSPITAL ENCOUNTER (OUTPATIENT)
Dept: LAB | Age: 48
Discharge: HOME | End: 2023-08-22
Payer: COMMERCIAL

## 2023-08-22 PROCEDURE — 97140 MANUAL THERAPY 1/> REGIONS: CPT

## 2023-08-22 PROCEDURE — 82306 VITAMIN D 25 HYDROXY: CPT

## 2023-08-22 PROCEDURE — 97110 THERAPEUTIC EXERCISES: CPT

## 2023-08-22 ASSESSMENT — PAIN DESCRIPTION - LOCATION: LOCATION: GENERALIZED

## 2023-08-22 ASSESSMENT — PAIN DESCRIPTION - PAIN TYPE: TYPE: CHRONIC PAIN

## 2023-08-22 ASSESSMENT — PAIN SCALES - GENERAL: PAINLEVEL_OUTOF10: 10

## 2023-08-22 NOTE — PROGRESS NOTES
Physical Therapy  Physical Therapy: Daily Note   Patient: Luz Metz (23 y.o. female)   Examination Date: 9427  Plan of Care/Certification Expiration Date: 23    No data recorded   :  1975 # of Visits since [facility]:   6   MRN: [id number]  CSN: [id number] Start of Care Date:   2023   Insurance: Payor: Chad Cockayne / Plan: Reza Mole / Product Type: *No Product type* /   Insurance ID: [id number] - (Medicaid Managed) Secondary Insurance (if applicable):    Referring Physician: MD Dr Cesar Frankel   PCP: GERARDO Hess CNP Visits to Date/Visits Approved: 7 / 10    No Show/Cancelled Appts:      Medical Diagnosis: Carpal tunnel syndrome, right upper limb [G56.01]  Carpal tunnel syndrome, left upper limb [G56.02]    Treatment Diagnosis: LBP , BLE pain and BLE weakness with decreased functional mobility        SUBJECTIVE EXAMINATION   Pain Level: Pain Screening  Patient Currently in Pain: Yes  Pain Assessment: 0-10  Pain Level: 10  Pain Type: Chronic pain  Pain Location: Generalized (\"everything hurts. \")    Patient Comments: Subjective: Pt. states \"I'm in a whole lot of pain today, 10+/10+\". \"Everything hurts\" Pt. having a hard time getting comfortable. Was at MD appointment this morning and pain medications were increased in dosage. HEP Compliance: Good        OBJECTIVE EXAMINATION   Restrictions:  No data recorded Required Braces or Orthoses?: -- (Recommended B wrist splints)   No data recorded              TREATMENT     Exercises:      Treatment Reasoning    Exercise 1: tall sit shoulder capital D stretch x 10 reps.   Exercise 2: tall sit deep breathing in nose 3-5 count and out mouth 3-5 count x 10  Exercise 3: tall sit 2 minutes without back support  Exercise 4: prone lying for hip flexor stretch x 1-3 min  Exercise 5: tall sit scap retractions x 10 hold 3 sec  Exercise 6: tall seated alt march cues for core engagement and not to lean back x 10 hold

## 2023-08-23 LAB — VITAMIN D 25-HYDROXY: 19.8 NG/ML

## 2023-08-25 ENCOUNTER — HOSPITAL ENCOUNTER (OUTPATIENT)
Dept: PHYSICAL THERAPY | Age: 48
Setting detail: THERAPIES SERIES
Discharge: HOME | End: 2023-08-25
Payer: COMMERCIAL

## 2023-08-25 PROCEDURE — 97116 GAIT TRAINING THERAPY: CPT

## 2023-08-25 PROCEDURE — 97110 THERAPEUTIC EXERCISES: CPT

## 2023-08-25 PROCEDURE — G0283 ELEC STIM OTHER THAN WOUND: HCPCS

## 2023-08-25 NOTE — PROGRESS NOTES
Long Term Goals Completed by 4-5 weeks or 10 visits Current Status Goal Status   Pt reporting <= 6/10 pain in LE and serina back for >= 25% of day to allwo better functional mobility. Pt able to complete >= 200ft of walking with ROllaotr with more upright posture an dhand not forearms on arms of Rollator with pain post < = 6/10 in bakc and LE       Increase LE strength to tolerate mod resistance of hip in sitting >= 4/5       Decrease Oswestry LBP scale for 76% disability to <= 40% disabilty and modified LEFS lower extremity funcitoanl scale from 84% to <= 50% to show increased funcitona dn any decrease pain in LEs and low back.  Oswestry 38/50= 76%, modified LEFS 10/64= 84% disability     Pt competent advanced HEP for discharge                                                    TREATMENT PLAN   Plan Frequency: 1-2x week  Plan weeks: 4-5 weeks or 10 visits  Current Treatment Recommendations: Strengthening, ROM, Balance training, Gait training, Functional mobility training, Transfer training, Pain management, Home exercise program, Positioning, Safety education & training, Therapeutic activities, Modalities, Patient/Caregiver education & training, Endurance training  Modalities: E-stim - unattended, Heat/Cold   Additional Comments: KT tape for hip, knee, back, posture       Therapy Time  Individual Time In:       130  Individual Time Out:  220  Minutes:  50        Electronically signed by Amadeo Ramesh PTA  on 2/44/5245 at 2:16 PM   POC NOTE

## 2023-08-25 NOTE — TELEPHONE ENCOUNTER
Spoke with [employer]. She reported she needs a letter stating pt needs wide hallways/stair ways in living space. Pt also requesting GUDELIA and grab bars. Can PCP create letter stating above accommodations?  If so, once complete fax to [employer] at [phone]

## 2023-08-29 ENCOUNTER — HOSPITAL ENCOUNTER (OUTPATIENT)
Dept: PHYSICAL THERAPY | Age: 48
Setting detail: THERAPIES SERIES
Discharge: HOME | End: 2023-08-29
Payer: COMMERCIAL

## 2023-08-29 PROCEDURE — 97530 THERAPEUTIC ACTIVITIES: CPT

## 2023-08-29 PROCEDURE — 97110 THERAPEUTIC EXERCISES: CPT

## 2023-08-29 ASSESSMENT — PAIN DESCRIPTION - PAIN TYPE: TYPE: CHRONIC PAIN

## 2023-08-29 ASSESSMENT — PAIN DESCRIPTION - ORIENTATION: ORIENTATION: LEFT;RIGHT

## 2023-08-29 ASSESSMENT — PAIN DESCRIPTION - LOCATION: LOCATION: BACK

## 2023-08-29 NOTE — PROGRESS NOTES
Physical Therapy: Monthly Recheck   Patient: Geraldine Rivas (6 y.o. female)   Examination Date:   Plan of Care/Certification Expiration Date: 23    No data recorded   :  1975 # of Visits since [facility]:   8   MRN: [id number]  CSN: [id number] Start of Care Date:   2023   Insurance: Payor: Antoinette Reich / Plan: Mary Valdez / Product Type: *No Product type* /   Insurance ID: [id number] - (Medicaid Managed) Secondary Insurance (if applicable):    Referring Physician: MD Dr Celio Ley   PCP: GERARDO Loredo CNP Visits to Date/Visits Approved:   No Show/Cancelled Appts:      Medical Diagnosis: Carpal tunnel syndrome, right upper limb [G56.01]  Carpal tunnel syndrome, left upper limb [G56.02] LBP, BLE pain, BLE weakness  Treatment Diagnosis: LBP , BLE pain and BLE weakness with decreased functional mobility        SUBJECTIVE EXAMINATION   Pain Level: Pain Screening  Patient Currently in Pain: Yes  Pain Assessment: 0-10  Worst Pain Level:  (9-10)  Pain Type: Chronic pain  Pain Location: Back (\"head to toe\", worse pain bilat hands and from chest to feet)  Pain Orientation: Left, Right    Patient Comments: Subjective: Pt reports 9-10 pain level and does not change much. Pt reports since starting therapy, pt more aware of posure in sitting, pt has \"good days an bad days\" uses crutches around the house, uses rollator for community ambulation, pt reports more \"good days\" since starting therayp, sometimes able to stand upright with hands on rollator, otherwise forearms on rollator.     HEP Compliance: Good  Previous treatments prior to current episode?: Injections, Outpatient PT     OBJECTIVE EXAMINATION   Restrictions:  No data recorded Required Braces or Orthoses?: -- (Recommended B wrist splints)   No data recorded    Ambulation/Gait (if applicable):   Ambulation  Surface: Level tile, Carpet  Device: Rollator  Assistance: Modified Independent  Quality of

## 2023-09-03 DIAGNOSIS — J45.40 MODERATE PERSISTENT ASTHMA WITHOUT COMPLICATION: ICD-10-CM

## 2023-09-05 NOTE — TELEPHONE ENCOUNTER
Pharmacy is requesting medication refill.  Please approve or deny this request.    Rx requested:  Requested Prescriptions     Pending Prescriptions Disp Refills    albuterol sulfate HFA (PROVENTIL;VENTOLIN;PROAIR) 108 (90 Base) MCG/ACT inhaler [Pharmacy Med Name: ALBUTEROL HFA (PROAIR) INHALER] 8.5 each      Sig: INHALE 2 PUFFS INTO THE LUNGS EVERY 6 HOURS AS NEEDED FOR WHEEZING OR SHORTNESS OF BREATH         Last Office Visit:   8/11/2023      Next Visit Date:  Future Appointments   Date Time Provider [address]   9/6/2023  0:22 PM Maninder Ross [address]   9/13/2023  3:71 PM Jacky Kevin PTA [address]   2/12/2024  2:00 PM Rogerio Aguilar APRN - CNP [address] Breath Sounds equal & clear to percussion & auscultation, no accessory muscle use

## 2023-09-06 ENCOUNTER — HOSPITAL ENCOUNTER (OUTPATIENT)
Dept: PHYSICAL THERAPY | Age: 48
Setting detail: THERAPIES SERIES
Discharge: HOME | End: 2023-09-06
Payer: COMMERCIAL

## 2023-09-06 PROCEDURE — 97110 THERAPEUTIC EXERCISES: CPT

## 2023-09-06 RX ORDER — ALBUTEROL SULFATE 90 UG/1
2 AEROSOL, METERED RESPIRATORY (INHALATION) EVERY 6 HOURS PRN
Qty: 1 EACH | Refills: 2 | Status: SHIPPED | OUTPATIENT
Start: 2023-09-06

## 2023-09-06 NOTE — PROGRESS NOTES
and LE Pt amb with hands on rollator upright posture 100 feet however pain >10/10 In progress   Increase LE strength to tolerate mod resistance of hip in sitting >= 4/5 GOAL not yet met however LE strength slowly improving In progress   Decrease Oswestry LBP scale for 76% disability to <= 40% disabilty and modified LEFS lower extremity funcitoanl scale from 84% to <= 50% to show increased funcitona dn any decrease pain in LEs and low back.  Day of recheck 8/29/23: Oswestry 76%  Modified LEFS  84% Not Met   Pt competent advanced HEP for discharge Progressing HEP as tolerated- last treatments focused on standing exs in attempts to imrpove standing/walking tolerance     New goal 8/29: Improve 5x sit to stand 30 sec or less 5x sit to stand 52 sec                                          TREATMENT PLAN   Plan Frequency: 1x per wk  Plan weeks: 3-4 weeks from recheck 8/29 (toatl of 7-8 weeks from IE  Current Treatment Recommendations: Strengthening, ROM, Balance training, Gait training, Functional mobility training, Transfer training, Pain management, Home exercise program, Positioning, Safety education & training, Therapeutic activities, Modalities, Patient/Caregiver education & training, Endurance training  Modalities: E-stim - unattended, Heat/Cold   Additional Comments: KT tape for hip, knee, back, posture       Therapy Time  Individual Time In:       300  Individual Time Out:  350  Minutes:  50        Electronically signed by Karen Granados PTA  on 2/2/0265 at 3:52 PM   POC NOTE

## 2023-09-13 ENCOUNTER — HOSPITAL ENCOUNTER (OUTPATIENT)
Dept: PHYSICAL THERAPY | Age: 48
Setting detail: THERAPIES SERIES
Discharge: HOME | End: 2023-09-13
Payer: COMMERCIAL

## 2023-09-13 PROCEDURE — 97110 THERAPEUTIC EXERCISES: CPT

## 2023-09-13 PROCEDURE — 97140 MANUAL THERAPY 1/> REGIONS: CPT

## 2023-09-13 ASSESSMENT — PAIN DESCRIPTION - LOCATION: LOCATION: BACK

## 2023-09-13 ASSESSMENT — PAIN DESCRIPTION - PAIN TYPE: TYPE: CHRONIC PAIN

## 2023-09-13 ASSESSMENT — PAIN DESCRIPTION - ORIENTATION: ORIENTATION: LOWER;RIGHT;LEFT

## 2023-09-13 ASSESSMENT — PAIN DESCRIPTION - DESCRIPTORS: DESCRIPTORS: SORE

## 2023-09-13 ASSESSMENT — PAIN SCALES - GENERAL: PAINLEVEL_OUTOF10: 9

## 2023-09-13 NOTE — PROGRESS NOTES
Physical Therapy  Physical Therapy: Daily Note   Patient: Gaviota Ariza (6 y.o. female)   Examination Date:   Plan of Care/Certification Expiration Date: 23    No data recorded   :  1975 # of Visits since [facility]:   10   MRN: [id number]  CSN: [id number] Start of Care Date:   2023   Insurance: Payor: Lamona Primrose / Plan: Bryan Harps / Product Type: *No Product type* /   Insurance ID: [id number] - (Medicaid Managed) Secondary Insurance (if applicable):    Referring Physician: MD Dr Jameson Hare   PCP: GERARDO Muller CNP Visits to Date/Visits Approved: 11 / 10    No Show/Cancelled Appts: 2 / 0     Medical Diagnosis: Carpal tunnel syndrome, right upper limb [G56.01]  Carpal tunnel syndrome, left upper limb [G56.02]    Treatment Diagnosis: LBP , BLE pain and BLE weakness with decreased functional mobility        SUBJECTIVE EXAMINATION   Pain Level: Pain Screening  Patient Currently in Pain: Yes  Pain Assessment: 0-10  Pain Level: 9  Pain Type: Chronic pain  Pain Location: Back  Pain Orientation: Lower, Right, Left  Pain Descriptors: Sore    Patient Comments: Subjective: Pt. reports pain is 9/10 today some radicular into post thighs.     HEP Compliance: Good        OBJECTIVE EXAMINATION   Restrictions:  No data recorded Required Braces or Orthoses?: -- (Recommended B wrist splints)   No data recorded              TREATMENT     Exercises:      Treatment Reasoning    Exercise 1: seated  abd bracing with inhale and exhale x 10 hold 3 sec for core contraction  Exercise 2: tall seated march with abd bracing x 10 hold 3 sec  Exercise 3: tall seated LAQ with abd bracing x 10 hold 3 sec  Exercise 4: 2 sit<>stand limited by pain/crampping in L thigh thus deferred  Exercise 5: supine hip flexor stretch 30 sec x 3  Exercise 6: LTR 20-30 sec arms T position  Exercise 7: Alt SLR x 10 with abd bracing  Exercise 8: hooklying ball squeeze with minibridge x 10 hold 3

## 2023-09-18 ENCOUNTER — HOSPITAL ENCOUNTER (OUTPATIENT)
Age: 48
Setting detail: SPECIMEN
Discharge: HOME | End: 2023-09-18
Payer: COMMERCIAL

## 2023-09-18 ENCOUNTER — OFFICE VISIT (OUTPATIENT)
Dept: FAMILY MEDICINE CLINIC | Age: 48
End: 2023-09-18
Payer: COMMERCIAL

## 2023-09-18 VITALS
HEART RATE: 98 BPM | SYSTOLIC BLOOD PRESSURE: 130 MMHG | OXYGEN SATURATION: 98 % | WEIGHT: 293 LBS | TEMPERATURE: 97.8 F | BODY MASS INDEX: 48.82 KG/M2 | HEIGHT: 65 IN | DIASTOLIC BLOOD PRESSURE: 70 MMHG

## 2023-09-18 DIAGNOSIS — B35.4 TINEA CORPORIS: ICD-10-CM

## 2023-09-18 DIAGNOSIS — S80.822A BLISTER OF LEFT LOWER EXTREMITY, INITIAL ENCOUNTER: ICD-10-CM

## 2023-09-18 DIAGNOSIS — S80.822A BLISTER OF LEFT LOWER EXTREMITY, INITIAL ENCOUNTER: Primary | ICD-10-CM

## 2023-09-18 PROCEDURE — 87186 SC STD MICRODIL/AGAR DIL: CPT

## 2023-09-18 PROCEDURE — 1036F TOBACCO NON-USER: CPT

## 2023-09-18 PROCEDURE — G8427 DOCREV CUR MEDS BY ELIG CLIN: HCPCS

## 2023-09-18 PROCEDURE — G8417 CALC BMI ABV UP PARAM F/U: HCPCS

## 2023-09-18 PROCEDURE — 86403 PARTICLE AGGLUT ANTBDY SCRN: CPT

## 2023-09-18 PROCEDURE — 87070 CULTURE OTHR SPECIMN AEROBIC: CPT

## 2023-09-18 PROCEDURE — 99213 OFFICE O/P EST LOW 20 MIN: CPT

## 2023-09-18 RX ORDER — KETOCONAZOLE 20 MG/G
CREAM TOPICAL
Qty: 30 G | Refills: 2 | Status: SHIPPED | OUTPATIENT
Start: 2023-09-18

## 2023-09-18 ASSESSMENT — ENCOUNTER SYMPTOMS
COLOR CHANGE: 1
RESPIRATORY NEGATIVE: 1
EYES NEGATIVE: 1

## 2023-09-18 NOTE — PROGRESS NOTES
[facility]          ASSESSMENT/PLAN     Kev Zeng is a 50 y.o. female who presents with:  Reports of blister formation and redness to bilateral lower extremities posteriorly. Area is nontender. Left side worse than right. She has chronic infection to skin folds behind the knees bilaterally. Patient previously evaluated by infectious disease and has been managed by wound care in the past.  Presentation behind bilateral knees is consistent with tinea infection. Consideration for extension of continued infection to bilateral calf areas versus bacterial infection. There is no diffuse erythremia. Culture swab is collected and sent. Orders for Norzol cream to be applied to posterior knees and calf areas twice daily. If no response will send to wound care. We will follow-up on wound culture when available. 1. Blister of left lower extremity, initial encounter  -     Culture, Wound Aerobic Only; Future  2. Tinea corporis  -     ketoconazole (NIZORAL) 2 % cream; Apply topically twice daily for at least 4 weeks (or for 1 week after lesions have healed). , Disp-30 g, R-2, Normal           PATIENT REFERRED TO:  Return if symptoms worsen or fail to improve. DISCHARGE MEDICATIONS:  New Prescriptions    KETOCONAZOLE (NIZORAL) 2 % CREAM    Apply topically twice daily for at least 4 weeks (or for 1 week after lesions have healed). Cannot display discharge medications since this is not an admission. GERARDO Gonsalez - CNP    CHIEF COMPLAINT       Chief Complaint   Patient presents with    Other     Skin issue x3 weeks on left and right lower back leg near ankles oozing blisters has spreading and getting bigger  pt has been keeping it clean pt denies pain or itching just oozing          SUBJECTIVE/REVIEW OF SYSTEMS     Review of Systems   Constitutional: Negative. Eyes: Negative. Respiratory: Negative. Cardiovascular: Negative. Endocrine: Negative.     Skin:

## 2023-09-19 ENCOUNTER — HOSPITAL ENCOUNTER (EMERGENCY)
Age: 48
Discharge: HOME | End: 2023-09-19
Attending: EMERGENCY MEDICINE | Admitting: EMERGENCY MEDICINE
Payer: COMMERCIAL

## 2023-09-19 VITALS
HEART RATE: 100 BPM | RESPIRATION RATE: 16 BRPM | BODY MASS INDEX: 48.82 KG/M2 | SYSTOLIC BLOOD PRESSURE: 149 MMHG | WEIGHT: 293 LBS | HEIGHT: 65 IN | OXYGEN SATURATION: 98 % | DIASTOLIC BLOOD PRESSURE: 85 MMHG | TEMPERATURE: 96.8 F

## 2023-09-19 DIAGNOSIS — L03.119 CELLULITIS OF LOWER EXTREMITY, UNSPECIFIED LATERALITY: Primary | ICD-10-CM

## 2023-09-19 PROCEDURE — 6370000000 HC RX 637 (ALT 250 FOR IP): Performed by: EMERGENCY MEDICINE

## 2023-09-19 PROCEDURE — 99283 EMERGENCY DEPT VISIT LOW MDM: CPT

## 2023-09-19 RX ORDER — CLINDAMYCIN HYDROCHLORIDE 150 MG/1
300 CAPSULE ORAL ONCE
Status: COMPLETED | OUTPATIENT
Start: 2023-09-19 | End: 2023-09-19

## 2023-09-19 RX ORDER — CLINDAMYCIN HYDROCHLORIDE 300 MG/1
300 CAPSULE ORAL 4 TIMES DAILY
Qty: 40 CAPSULE | Refills: 0 | Status: SHIPPED | OUTPATIENT
Start: 2023-09-19 | End: 2023-09-29

## 2023-09-19 RX ORDER — GINSENG 100 MG
CAPSULE ORAL ONCE
Status: COMPLETED | OUTPATIENT
Start: 2023-09-19 | End: 2023-09-19

## 2023-09-19 RX ADMIN — BACITRACIN: 500 OINTMENT TOPICAL at 23:13

## 2023-09-19 RX ADMIN — CLINDAMYCIN HYDROCHLORIDE 300 MG: 150 CAPSULE ORAL at 23:13

## 2023-09-19 ASSESSMENT — LIFESTYLE VARIABLES
HOW MANY STANDARD DRINKS CONTAINING ALCOHOL DO YOU HAVE ON A TYPICAL DAY: PATIENT DOES NOT DRINK
HOW OFTEN DO YOU HAVE A DRINK CONTAINING ALCOHOL: NEVER

## 2023-09-19 ASSESSMENT — PAIN - FUNCTIONAL ASSESSMENT: PAIN_FUNCTIONAL_ASSESSMENT: NONE - DENIES PAIN

## 2023-09-20 ENCOUNTER — HOSPITAL ENCOUNTER (OUTPATIENT)
Dept: PHYSICAL THERAPY | Age: 48
Setting detail: THERAPIES SERIES
Discharge: HOME | End: 2023-09-20
Payer: COMMERCIAL

## 2023-09-20 DIAGNOSIS — G43.009 MIGRAINE WITHOUT AURA AND WITHOUT STATUS MIGRAINOSUS, NOT INTRACTABLE: Primary | ICD-10-CM

## 2023-09-20 PROCEDURE — 97110 THERAPEUTIC EXERCISES: CPT

## 2023-09-20 PROCEDURE — 97116 GAIT TRAINING THERAPY: CPT

## 2023-09-20 RX ORDER — BUTALBITAL, ACETAMINOPHEN AND CAFFEINE 300; 40; 50 MG/1; MG/1; MG/1
1 CAPSULE ORAL EVERY 4 HOURS PRN
Qty: 30 CAPSULE | Refills: 2 | Status: SHIPPED | OUTPATIENT
Start: 2023-09-20

## 2023-09-20 ASSESSMENT — PAIN DESCRIPTION - DESCRIPTORS: DESCRIPTORS: SORE

## 2023-09-20 ASSESSMENT — PAIN SCALES - GENERAL: PAINLEVEL_OUTOF10: 9

## 2023-09-20 ASSESSMENT — PAIN DESCRIPTION - ORIENTATION: ORIENTATION: RIGHT;LEFT;LOWER

## 2023-09-20 ASSESSMENT — PAIN DESCRIPTION - PAIN TYPE: TYPE: CHRONIC PAIN

## 2023-09-20 ASSESSMENT — PAIN DESCRIPTION - LOCATION: LOCATION: BACK

## 2023-09-20 NOTE — PROGRESS NOTES
Therapy Time  Individual Time In:       300  Individual Time Out:  345  Minutes:  45        Electronically signed by Herlinda Ku PTA  on 1/97/9805 at 4:03 PM   POC NOTE

## 2023-09-20 NOTE — ED TRIAGE NOTES
Pt presents with concern for blisters on left lower leg. Pt given prescription cream by PCP yesterday and has yet to have them filled.

## 2023-09-20 NOTE — ED PROVIDER NOTES
CC/HPI: 59-year-old female to the emergency department chief complaint of rash on left posterior calf      VITALS/PMH/PSH: Reviewed per nurses notes    REVIEW OF SYSTEMS: As in chief complaint history of present illness, otherwise all other systems are reviewed and negative the total 10 systems reviewed    PHYSICAL EXAM:  GEN: Pt alert and oriented, no acute distress  HEENT:         Normocephalic/Atramatic        PERRL, EOMI       EACs and TMs clear b/l       Throat non-edematous. No erythema noted. No exudates noted. Moist membranes  NECK: Nontender, no signs of trauma, no lymphadenopathy  HEART: Reg S1/S2, without murmer, rub or gallop  LUNGS: Clear to auscultation bilaterally, respirations even and unlabored  ABDOMEN: Bowel sounds positive, soft, nondistended. Non-tender to palpation. No guarding rebound or rigidity  MUSCULOSKELETAL/EXTREMITITES:  No signs of trauma, cyanosis or edema. No CVA tenderness  LYMPH: no peripheral lympadenopathy noted  SKIN:  Warm & dry, no rash  NEUROLOGIC:  Alert and oriented.   Speech clear    Medical decision making/ED course;      Final Clinical impression;  1)     Disposition/plan;

## 2023-09-22 LAB
BACTERIA SPEC ANAEROBE+AEROBE CULT: ABNORMAL
BACTERIA SPEC ANAEROBE+AEROBE CULT: ABNORMAL
ORGANISM: ABNORMAL

## 2023-09-25 ENCOUNTER — TELEPHONE (OUTPATIENT)
Dept: FAMILY MEDICINE CLINIC | Age: 48
End: 2023-09-25

## 2023-09-25 NOTE — TELEPHONE ENCOUNTER
[address] [phone] has questions re paperwork that was filled out by [employer] ways/ door frames, need to know how wide on both to assists.

## 2023-09-26 ENCOUNTER — TELEPHONE (OUTPATIENT)
Dept: FAMILY MEDICINE CLINIC | Age: 48
End: 2023-09-26

## 2023-09-27 ENCOUNTER — HOSPITAL ENCOUNTER (OUTPATIENT)
Dept: PHYSICAL THERAPY | Age: 48
Setting detail: THERAPIES SERIES
Discharge: HOME | End: 2023-09-27
Payer: COMMERCIAL

## 2023-09-27 PROCEDURE — 97110 THERAPEUTIC EXERCISES: CPT

## 2023-09-27 ASSESSMENT — PAIN SCALES - GENERAL: PAINLEVEL_OUTOF10: 10

## 2023-09-27 NOTE — PROGRESS NOTES
Physical Therapy  Physical Therapy: Daily Note   Patient: Justine Maria (77 y.o. female)   Examination Date:   Plan of Care/Certification Expiration Date: 23    No data recorded   :  1975 # of Visits since [facility]:   12   MRN: [id number]  CSN: [id number] Start of Care Date:   2023   Insurance: Payor: Tessy Sy / Plan: Kumar Sharma / Product Type: *No Product type* /   Insurance ID: [id number] - (Medicaid Managed) Secondary Insurance (if applicable):    Referring Physician: MD Dr Elena Wiley   PCP: GERARDO Flores - CNP Visits to Date/Visits Approved: 15 / 10    No Show/Cancelled Appts: 2      Medical Diagnosis: Carpal tunnel syndrome, right upper limb [G56.01]  Carpal tunnel syndrome, left upper limb [G56.02]    Treatment Diagnosis: LBP , BLE pain and BLE weakness with decreased functional mobility        SUBJECTIVE EXAMINATION   Pain Level: Pain Screening  Patient Currently in Pain: Yes  Pain Assessment: 0-10  Pain Level: 10 (10/10 generalized)    Patient Comments: Subjective: 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 Pt. states she called MD's and stopped taking medications currently. Has upcoming appointments for possible new medications.     HEP Compliance: Good        OBJECTIVE EXAMINATION   Restrictions:  No data recorded Required Braces or Orthoses?: -- (Recommended B wrist splints)   No data recorded              TREATMENT     Exercises:      Treatment Reasoning    Exercise 1: bridge with ball x 6 hold 3 sec limited by cramping post L thigh  Exercise 8: supported standing alt hip ABD x 5  Exercise 9: supported standing alt march x 10  Exercise 10: tall seated mid rows x 15 RTB hold 3 sec  Exercise 11: tall seated B shd ext RTB x 15 hold 3 sec  Exercise 12: minisquats x 10  Exercise 13: Sit<>stand x 5 32\"                          Gait: (CPT [id number])  Treatment Reasoning    Gait Training

## 2023-10-02 ENCOUNTER — HOSPITAL ENCOUNTER (OUTPATIENT)
Dept: GENERAL RADIOLOGY | Age: 48
Discharge: HOME | End: 2023-10-04
Payer: COMMERCIAL

## 2023-10-02 ENCOUNTER — HOSPITAL ENCOUNTER (OUTPATIENT)
Age: 48
Discharge: HOME | End: 2023-10-04
Payer: COMMERCIAL

## 2023-10-02 ENCOUNTER — HOSPITAL ENCOUNTER (OUTPATIENT)
Dept: LAB | Age: 48
Discharge: HOME | End: 2023-10-02
Payer: COMMERCIAL

## 2023-10-02 DIAGNOSIS — E03.9 HYPOTHYROIDISM, UNSPECIFIED TYPE: ICD-10-CM

## 2023-10-02 DIAGNOSIS — M51.26 DISPLACEMENT OF LUMBAR INTERVERTEBRAL DISC WITHOUT MYELOPATHY: ICD-10-CM

## 2023-10-02 DIAGNOSIS — M51.36 DEGENERATION OF LUMBAR INTERVERTEBRAL DISC: ICD-10-CM

## 2023-10-02 LAB
ALBUMIN SERPL-MCNC: 4 G/DL (ref 3.5–4.6)
ALP SERPL-CCNC: 107 U/L (ref 40–130)
ALT SERPL-CCNC: 11 U/L (ref 0–33)
ANION GAP SERPL CALCULATED.3IONS-SCNC: 11 MEQ/L (ref 9–15)
AST SERPL-CCNC: 18 U/L (ref 0–35)
BASOPHILS # BLD: 0.1 K/UL (ref 0–0.2)
BASOPHILS NFR BLD: 0.6 %
BILIRUB SERPL-MCNC: 0.3 MG/DL (ref 0.2–0.7)
BUN SERPL-MCNC: 11 MG/DL (ref 6–20)
CALCIUM SERPL-MCNC: 9.2 MG/DL (ref 8.5–9.9)
CHLORIDE SERPL-SCNC: 101 MEQ/L (ref 95–107)
CO2 SERPL-SCNC: 25 MEQ/L (ref 20–31)
CREAT SERPL-MCNC: 0.84 MG/DL (ref 0.5–0.9)
EOSINOPHIL # BLD: 0.2 K/UL (ref 0–0.7)
EOSINOPHIL NFR BLD: 2 %
ERYTHROCYTE [DISTWIDTH] IN BLOOD BY AUTOMATED COUNT: 15.2 % (ref 11.5–14.5)
GLOBULIN SER CALC-MCNC: 3.7 G/DL (ref 2.3–3.5)
GLUCOSE SERPL-MCNC: 88 MG/DL (ref 70–99)
HCT VFR BLD AUTO: 38 % (ref 37–47)
HGB BLD-MCNC: 10.9 G/DL (ref 12–16)
LYMPHOCYTES # BLD: 1.7 K/UL (ref 1–4.8)
LYMPHOCYTES NFR BLD: 19.5 %
MCH RBC QN AUTO: 23.9 PG (ref 27–31.3)
MCHC RBC AUTO-ENTMCNC: 28.7 % (ref 33–37)
MCV RBC AUTO: 83.3 FL (ref 79.4–94.8)
MONOCYTES # BLD: 0.4 K/UL (ref 0.2–0.8)
MONOCYTES NFR BLD: 5 %
NEUTROPHILS # BLD: 6.1 K/UL (ref 1.4–6.5)
NEUTS SEG NFR BLD: 72.4 %
PLATELET # BLD AUTO: 513 K/UL (ref 130–400)
POTASSIUM SERPL-SCNC: 4.3 MEQ/L (ref 3.4–4.9)
PROT SERPL-MCNC: 7.7 G/DL (ref 6.3–8)
RBC # BLD AUTO: 4.56 M/UL (ref 4.2–5.4)
SODIUM SERPL-SCNC: 137 MEQ/L (ref 135–144)
T4 FREE SERPL-MCNC: 1.08 NG/DL (ref 0.84–1.68)
TSH SERPL-MCNC: 2.03 UIU/ML (ref 0.44–3.86)
WBC # BLD AUTO: 8.5 K/UL (ref 4.8–10.8)

## 2023-10-02 PROCEDURE — 80053 COMPREHEN METABOLIC PANEL: CPT

## 2023-10-02 PROCEDURE — 85025 COMPLETE CBC W/AUTO DIFF WBC: CPT

## 2023-10-02 PROCEDURE — 72110 X-RAY EXAM L-2 SPINE 4/>VWS: CPT

## 2023-10-02 PROCEDURE — 84439 ASSAY OF FREE THYROXINE: CPT

## 2023-10-02 PROCEDURE — 84443 ASSAY THYROID STIM HORMONE: CPT

## 2023-10-02 PROCEDURE — 36415 COLL VENOUS BLD VENIPUNCTURE: CPT

## 2023-10-04 ENCOUNTER — HOSPITAL ENCOUNTER (OUTPATIENT)
Dept: PHYSICAL THERAPY | Age: 48
Setting detail: THERAPIES SERIES
Discharge: HOME | End: 2023-10-04

## 2023-10-04 NOTE — PROGRESS NOTES
Physical Therapy: Cancelled Visit Note   Patient: Maryellen Ponce (80 y.o. female)   Examination Date:   Plan of Care/Certification Expiration Date: 23    No data recorded   :  1975 # of Visits since [facility]:   13   MRN: [id number]  CSN: [id number] Start of Care Date:   2023   Insurance: Payor: Duarte Dye / Plan: Hyacinth Sebastian / Product Type: *No Product type* /   Insurance ID: [id number] - (Medicaid Managed) Secondary Insurance (if applicable):    Referring Physician: MD Dr Halie Cornell   PCP: GERARDO Maria CNP Visits to Date/Visits Approved: 13 / 10    No Show/Cancelled Appts:      Medical Diagnosis: Carpal tunnel syndrome, right upper limb [G56.01]  Carpal tunnel syndrome, left upper limb [G56.02]    Treatment Diagnosis: LBP , BLE pain and BLE weakness with decreased functional mobility        SUBJECTIVE EXAMINATION   Pain Level:      Patient Comments: Subjective: Pt cx her recheck visit today. No reason given.              Electronically signed by Lucy Casas PT  on 10/4/2023 at 2:30 PM   POC NOTE

## 2023-10-06 ENCOUNTER — HOSPITAL ENCOUNTER (OUTPATIENT)
Dept: PHYSICAL THERAPY | Age: 48
Setting detail: THERAPIES SERIES
Discharge: HOME | End: 2023-10-06

## 2023-10-06 NOTE — PROGRESS NOTES
Physical Therapy: Daily Note   Patient: Montserrat Solis (78 y.o. female)   Examination Date:   Plan of Care/Certification Expiration Date: 23    No data recorded   :  1975 # of Visits since [facility]:   13   MRN: [id number]  CSN: [id number] Start of Care Date:   2023   Insurance: Payor: Joe David / Plan: [employer] / Product Type: *No Product type* /   Insurance ID: [id number] - (Medicaid Managed) Secondary Insurance (if applicable):    Referring Physician: MD Dr Simone Carr   PCP: GERARDO Navarrete CNP Visits to Date/Visits Approved: 13 / 10    No Show/Cancelled Appts:   / 2     Medical Diagnosis: Carpal tunnel syndrome, right upper limb [G56.01]  Carpal tunnel syndrome, left upper limb [G56.02]    Treatment Diagnosis: LBP , BLE pain and BLE weakness with decreased functional mobility        SUBJECTIVE EXAMINATION   Patient Comments: Subjective: Pt cx her recheck visit today due to illness.     Therapy Time  Individual Time In:         Individual Time Out:    Minutes:  0 cx        Electronically signed by Mellisa Devries PT  on 10/6/2023 at 1:25 PM   POC NOTE

## 2023-10-19 ENCOUNTER — APPOINTMENT (OUTPATIENT)
Dept: GENERAL RADIOLOGY | Age: 48
End: 2023-10-19
Payer: COMMERCIAL

## 2023-10-19 ENCOUNTER — HOSPITAL ENCOUNTER (EMERGENCY)
Age: 48
Discharge: HOME | End: 2023-10-19
Attending: EMERGENCY MEDICINE | Admitting: EMERGENCY MEDICINE
Payer: COMMERCIAL

## 2023-10-19 ENCOUNTER — HOSPITAL ENCOUNTER (OUTPATIENT)
Dept: PHYSICAL THERAPY | Age: 48
Setting detail: THERAPIES SERIES
Discharge: HOME | End: 2023-10-19
Payer: COMMERCIAL

## 2023-10-19 VITALS
SYSTOLIC BLOOD PRESSURE: 129 MMHG | HEIGHT: 65 IN | WEIGHT: 293 LBS | DIASTOLIC BLOOD PRESSURE: 74 MMHG | BODY MASS INDEX: 48.82 KG/M2 | HEART RATE: 74 BPM | RESPIRATION RATE: 16 BRPM | TEMPERATURE: 97.8 F | OXYGEN SATURATION: 98 %

## 2023-10-19 DIAGNOSIS — S60.222A CONTUSION OF LEFT HAND, INITIAL ENCOUNTER: Primary | ICD-10-CM

## 2023-10-19 DIAGNOSIS — S83.91XS SPRAIN OF RIGHT KNEE, UNSPECIFIED LIGAMENT, SEQUELA: ICD-10-CM

## 2023-10-19 DIAGNOSIS — S73.101A HIP SPRAIN, RIGHT, INITIAL ENCOUNTER: ICD-10-CM

## 2023-10-19 PROCEDURE — 73502 X-RAY EXAM HIP UNI 2-3 VIEWS: CPT

## 2023-10-19 PROCEDURE — 73562 X-RAY EXAM OF KNEE 3: CPT

## 2023-10-19 PROCEDURE — 97530 THERAPEUTIC ACTIVITIES: CPT

## 2023-10-19 PROCEDURE — 99283 EMERGENCY DEPT VISIT LOW MDM: CPT

## 2023-10-19 PROCEDURE — 73130 X-RAY EXAM OF HAND: CPT

## 2023-10-19 ASSESSMENT — PAIN DESCRIPTION - ORIENTATION
ORIENTATION: RIGHT;LEFT
ORIENTATION: LEFT
ORIENTATION: RIGHT;LEFT
ORIENTATION: LEFT;RIGHT

## 2023-10-19 ASSESSMENT — PAIN DESCRIPTION - LOCATION
LOCATION: WRIST
LOCATION: HAND;HIP;KNEE

## 2023-10-19 ASSESSMENT — PAIN DESCRIPTION - DESCRIPTORS
DESCRIPTORS: ACHING
DESCRIPTORS: THROBBING

## 2023-10-19 ASSESSMENT — PAIN DESCRIPTION - FREQUENCY
FREQUENCY: CONTINUOUS

## 2023-10-19 ASSESSMENT — PAIN SCALES - GENERAL
PAINLEVEL_OUTOF10: 10

## 2023-10-19 ASSESSMENT — PAIN DESCRIPTION - PAIN TYPE
TYPE: ACUTE PAIN

## 2023-10-19 ASSESSMENT — PAIN - FUNCTIONAL ASSESSMENT
PAIN_FUNCTIONAL_ASSESSMENT: 0-10
PAIN_FUNCTIONAL_ASSESSMENT: 0-10

## 2023-10-19 NOTE — ED PROVIDER NOTES
CC/HPI: 55-year-old female to the emergency department chief complaint of lower back right hip right knee and left wrist pain after falling last night. Patient states she got out of bed and slipped and fell landing on her right hip and side. No head injury no loss of consciousness no neck or back pain. Patient states that she was not lightheaded or dizzy prior to the fall. Denies any paresthesias. Patient states that she is in pain management for chronic back pain. VITALS/PMH/PSH: Reviewed per nurses notes    REVIEW OF SYSTEMS: As in chief complaint history of present illness, otherwise all other systems are reviewed and negative the total 10 systems reviewed    PHYSICAL EXAM:  GEN: Pt alert and oriented, no acute distress  HEENT:         Normocephalic/Atramatic        PERRL, EOMI  NECK: Nontender, no signs of trauma, no lymphadenopathy  HEART: Reg S1/S2, without murmer, rub or gallop  LUNGS: Clear to auscultation bilaterally, respirations even and unlabored  MUSCULOSKELETAL/EXTREMITITES:    No outward signs of trauma. No midline tenderness throughout the lumbar spine. Patient tender right sciatic area and right hip area. Ambulates with mild limp. Difficult to assess joint effusion right knee secondary to body habitus. Patient with tenderness to palpation lateral aspect of right knee. Good range of motion with discomfort neurovascular intact distally. No calf swelling or tenderness. Examination of the left hand shows no gross deformity or swelling. No bruising noted. No erythema or warmth noted. Patient tender over the fourth and fifth metacarpal bones neurovascular intact distally. Limited range of motion secondary to discomfort. LYMPH: no peripheral lympadenopathy noted  SKIN:  Warm & dry, no rash  NEUROLOGIC:  Alert and oriented. Speech clear    Medical decision making/ED course;  55-year-old female to the emergency department with multiple injuries secondary to fall.   X-rays were obtained

## 2023-10-19 NOTE — PROGRESS NOTES
Physical Therapy: Recheck Note   Patient: Erik Jackson (26 y.o. female)   Examination Date:   Plan of Care/Certification Expiration Date: 23    Progress Note Counter: Recheck completed on 10/19/23 and plan to see pt 1x per week for 4 more weeks and then DC to HEP; Pt fell at home last night injuring her left hand and is sore in her right knee, hip and back and is planning to go to ER after her recheck assessment today so not able to tolerate some of the MMT testing. Pt to contact our dept if the injuries will limit her from progressing with PT.     :  1975 # of Visits since [facility]:   13   MRN: [id number]  CSN: [id number] Start of Care Date:   2023   Insurance: Payor: Roberto Patrick / Plan: Alexis Jenkins / Product Type: *No Product type* /   Insurance ID: [id number] - (Medicaid Managed) Secondary Insurance (if applicable):    Referring Physician: MD Dr Elijah Henry   PCP: GERARDO Milan CNP Visits to Date/Visits Approved:     No Show/Cancelled Appts:      Medical Diagnosis: Carpal tunnel syndrome, right upper limb [G56.01]  Carpal tunnel syndrome, left upper limb [G56.02]    Treatment Diagnosis: LBP , BLE pain and BLE weakness with decreased functional mobility        SUBJECTIVE EXAMINATION   Pain Level: Pain Screening  Patient Currently in Pain: Yes  Pain Assessment: 0-10  Pain Level: 10  Pain Type: Acute pain  Pain Location: Hand, Hip, Knee  Pain Orientation: Right, Left  Pain Descriptors: Throbbing    Patient Comments: Subjective: Pt here for recheck and reports that she fell last and injured her left hand, right knee, hip and back.  Pt reports her throbbing pain as 10/10 but willing to do her recheck and then go to ER for xrays (per pt request)    HEP Compliance: Good        OBJECTIVE EXAMINATION   Restrictions:  No data recorded Required Braces or Orthoses?: -- (Recommended B wrist splints)   No data recorded              Left Strength  Right

## 2023-10-25 ENCOUNTER — HOSPITAL ENCOUNTER (OUTPATIENT)
Dept: PHYSICAL THERAPY | Age: 48
Setting detail: THERAPIES SERIES
Discharge: HOME | End: 2023-10-25
Payer: COMMERCIAL

## 2023-10-25 PROCEDURE — 97116 GAIT TRAINING THERAPY: CPT

## 2023-10-25 PROCEDURE — 97110 THERAPEUTIC EXERCISES: CPT

## 2023-10-25 NOTE — PROGRESS NOTES
Current Status[de-identified] Pt amb with hands on rollator upright posture 100 feet however pain >10/10 (10/19/23 - held current gait due to pt having a fall last night)  LTG Goal 2 Status[de-identified] Not Met  Long Term Goal 3: Increase LE strength to tolerate mod resistance of hip in sitting >= 4/5  LTG 3 Current Status[de-identified] GOAL in progress - LE strength slowly improving  LTG Goal 3 Status[de-identified] In progress  Long Term Goal 4: Decrease Oswestry LBP scale for 76% disability to <= 40% disabilty and modified LEFS lower extremity funcitoanl scale from 84% to <= 50% to show increased funcitona dn any decrease pain in LEs and low back. LTG 4 Current Status[de-identified] Day of recheck 10/19/23: Oswestry 78%  Modified LEFS  84%  LTG Goal 4 Status[de-identified] Not Met  Long Term Goal 5: Pt competent advanced HEP for discharge  LTG 5 Current Status[de-identified] Progressing HEP as tolerated  LTG Goal 5 Status[de-identified] In progress  Long term goal 6: New goal 8/29: Improve 5x sit to stand 30 sec or less  LTG 6 Current Status[de-identified] 8/29/23 5x sit to stand 52 sec (held at 10/19/23 recheck due to fall the day before)  Patient Goals   Patient Goals : \"I want to be able to stand and walk better because my pain is less in my back and legs. \"    Plan:    Physcial Therapy Plan  Plan weeks: 4 weeks from recheck 10/19/23  Current Treatment Recommendations: Strengthening, ROM, Balance training, Gait training, Functional mobility training, Transfer training, Pain management, Home exercise program, Positioning, Safety education & training, Therapeutic activities, Modalities, Patient/Caregiver education & training, Endurance training  Additional Comments: KT tape for hip, knee, back, posture        Therapy Time:   Individual Concurrent Group Co-treatment   Time In  130         Time Out  215         Minutes  [address] [place] [address]

## 2023-10-26 ENCOUNTER — TELEPHONE (OUTPATIENT)
Dept: FAMILY MEDICINE CLINIC | Age: 48
End: 2023-10-26

## 2023-10-26 NOTE — TELEPHONE ENCOUNTER
Patient will call Patient [address] [place] [address] [phone] when she is ready  to schedule Mammogram

## 2023-10-31 ENCOUNTER — HOSPITAL ENCOUNTER (OUTPATIENT)
Dept: LAB | Age: 48
Discharge: HOME | End: 2023-10-31
Payer: COMMERCIAL

## 2023-10-31 ENCOUNTER — OFFICE VISIT (OUTPATIENT)
Dept: FAMILY MEDICINE CLINIC | Age: 48
End: 2023-10-31
Payer: COMMERCIAL

## 2023-10-31 VITALS
HEART RATE: 81 BPM | HEIGHT: 65 IN | SYSTOLIC BLOOD PRESSURE: 138 MMHG | BODY MASS INDEX: 48.82 KG/M2 | OXYGEN SATURATION: 98 % | WEIGHT: 293 LBS | DIASTOLIC BLOOD PRESSURE: 84 MMHG | TEMPERATURE: 97.2 F

## 2023-10-31 DIAGNOSIS — S69.92XD INJURY OF LEFT WRIST, SUBSEQUENT ENCOUNTER: Primary | ICD-10-CM

## 2023-10-31 DIAGNOSIS — I89.0 DEPENDENT LYMPHEDEMA DUE TO IMPAIRED MOBILITY: ICD-10-CM

## 2023-10-31 DIAGNOSIS — D50.9 IRON DEFICIENCY ANEMIA, UNSPECIFIED IRON DEFICIENCY ANEMIA TYPE: ICD-10-CM

## 2023-10-31 DIAGNOSIS — R29.898 WEAKNESS OF BOTH LOWER EXTREMITIES: ICD-10-CM

## 2023-10-31 DIAGNOSIS — Z74.09 DEPENDENT LYMPHEDEMA DUE TO IMPAIRED MOBILITY: ICD-10-CM

## 2023-10-31 PROCEDURE — G8417 CALC BMI ABV UP PARAM F/U: HCPCS

## 2023-10-31 PROCEDURE — G8484 FLU IMMUNIZE NO ADMIN: HCPCS

## 2023-10-31 PROCEDURE — 1036F TOBACCO NON-USER: CPT

## 2023-10-31 PROCEDURE — 83550 IRON BINDING TEST: CPT

## 2023-10-31 PROCEDURE — 83540 ASSAY OF IRON: CPT

## 2023-10-31 PROCEDURE — G8427 DOCREV CUR MEDS BY ELIG CLIN: HCPCS

## 2023-10-31 PROCEDURE — 99213 OFFICE O/P EST LOW 20 MIN: CPT

## 2023-10-31 RX ORDER — FERROUS SULFATE 325(65) MG
325 TABLET ORAL 2 TIMES DAILY
Qty: 180 TABLET | Refills: 1 | Status: SHIPPED | OUTPATIENT
Start: 2023-10-31

## 2023-10-31 ASSESSMENT — ENCOUNTER SYMPTOMS
COUGH: 0
RESPIRATORY NEGATIVE: 1
SHORTNESS OF BREATH: 0
BACK PAIN: 0
COLOR CHANGE: 0
GASTROINTESTINAL NEGATIVE: 1

## 2023-10-31 NOTE — PROGRESS NOTES
[address] [facility]          ASSESSMENT/PLAN     Omar Viramontes is a 50 y.o. female who presents with: Follow-up for left wrist injury seen in the ER with negative x-ray on 10/19. Patient has been wearing wrist splint reports continued pain with movement. Also with complaint of worsening fatigue reports she starts to get up to go somewhere and will become fatigued very quickly and feel weak in the legs. LMP  3 months irregular 3-4 days       1. Injury of left wrist, subsequent encounter  Symptoms of wrist pain not improving despite wearing wrist splint. Negative x-ray on October 19. Examination there is no swelling and erythremia. She is positive for snuffbox tenderness. And has decreased range of motion.  -     [address] [facility], [place]  2. Weakness of both lower extremities  Reports fatigue and leg weakness happening rapidly after she begins to walk. Has chronic edema. On recent lab work it was noted patient had worsening of anemia history of iron deficient anemia. Reports she is compliant with iron supplementation. Denies dark tarry stools denied bleeding. Last menstrual period was 3 months ago. We will evaluate iron studies. Patient sent with supplies for stool sampling to check for occult blood. -     Iron and TIBC; Future       -     Blood Occult Stool Screen #1; Future  3. Iron deficiency anemia, unspecified iron deficiency anemia type  -     Iron and TIBC; Future  -     Blood Occult Stool Screen #1; Future  4. Dependent lymphedema due to impaired mobility  -     Misc. Devices (BARIATRIC ROLLATOR) MISC; DAILY Starting Tue 10/31/2023, Disp-1 each, R-0, Normal           PATIENT REFERRED TO:  No follow-ups on file. DISCHARGE MEDICATIONS:  New Prescriptions    FERROUS SULFATE (IRON 325) 325 (65 FE) MG TABLET    Take 1 tablet by mouth 2 times daily    MISC.  DEVICES (BARIATRIC ROLLATOR) MISC    1 each by Does not apply route daily

## 2023-11-01 ENCOUNTER — HOSPITAL ENCOUNTER (OUTPATIENT)
Dept: PHYSICAL THERAPY | Age: 48
Setting detail: THERAPIES SERIES
Discharge: HOME | End: 2023-11-01
Payer: COMMERCIAL

## 2023-11-01 DIAGNOSIS — D50.9 IRON DEFICIENCY ANEMIA, UNSPECIFIED IRON DEFICIENCY ANEMIA TYPE: Primary | ICD-10-CM

## 2023-11-01 LAB
IRON SATURATION: 9 % (ref 20–55)
IRON: 31 UG/DL (ref 37–145)
TOTAL IRON BINDING CAPACITY: 329 UG/DL (ref 250–450)
UNSATURATED IRON BINDING CAPACITY: 298 UG/DL (ref 112–347)

## 2023-11-01 PROCEDURE — 97140 MANUAL THERAPY 1/> REGIONS: CPT

## 2023-11-01 PROCEDURE — 97110 THERAPEUTIC EXERCISES: CPT

## 2023-11-01 PROCEDURE — G0283 ELEC STIM OTHER THAN WOUND: HCPCS

## 2023-11-01 NOTE — PROGRESS NOTES
Patient Goals : \"I want to be able to stand and walk better because my pain is less in my back and legs. \"  Short Term Goals Completed by 1-2 weeks Current Status Goal Status   Pt reporting any decrease in pain in back and or LE with HEP Pt reports that her pain varies and able to do her exercises daily Partially met   Pt able to stand unsupported >= 45 seconds before not tolerated due to pain 8/29/23: stand unsupported 47 sec prior to needing to sit Met   Pt able to walk with hands on Rollator and more upright posture >= 50 feet Pt able to ambulate for >100'x 1 with better posture but had increased pain In progress   Pt reporting HEP at least once a day 5 days a week or more ot gain strength and manage pain Pt performing HEP daily Met                                                       Long Term Goals Completed by 6-8 weeks or 18 visits Current Status Goal Status   Pt reporting <= 6/10 pain in LE and low back for >= 25% of day to allwo better functional mobility. Pain remains 9-10 majority of day  and night Not Met   Pt able to complete >= 200ft of walking with ROllaotr with more upright posture an dhand not forearms on arms of Rollator with pain post < = 6/10 in back and LE Pt amb with hands on rollator upright posture 100 feet however pain >10/10 (10/19/23 - held current gait due to pt having a fall last night) Not Met   Increase LE strength to tolerate mod resistance of hip in sitting >= 4/5 GOAL in progress - LE strength slowly improving In progress   Decrease Oswestry LBP scale for 76% disability to <= 40% disabilty and modified LEFS lower extremity funcitoanl scale from 84% to <= 50% to show increased funcitona dn any decrease pain in LEs and low back.  Day of recheck 10/19/23: Oswestry 78%  Modified LEFS  84% Not Met   Pt competent advanced HEP for discharge Progressing HEP as tolerated In progress   New goal 8/29: Improve 5x sit to stand 30 sec or less 8/29/23 5x sit to stand 52 sec (held at 10/19/23

## 2023-11-02 ENCOUNTER — OFFICE VISIT (OUTPATIENT)
Dept: ORTHOPEDIC SURGERY | Age: 48
End: 2023-11-02
Payer: COMMERCIAL

## 2023-11-02 DIAGNOSIS — G56.02 LEFT CARPAL TUNNEL SYNDROME: ICD-10-CM

## 2023-11-02 DIAGNOSIS — G56.01 RIGHT CARPAL TUNNEL SYNDROME: ICD-10-CM

## 2023-11-02 DIAGNOSIS — S63.502A SPRAIN OF LEFT WRIST, INITIAL ENCOUNTER: ICD-10-CM

## 2023-11-02 DIAGNOSIS — M25.532 LEFT WRIST PAIN: Primary | ICD-10-CM

## 2023-11-02 PROCEDURE — G8484 FLU IMMUNIZE NO ADMIN: HCPCS | Performed by: STUDENT IN AN ORGANIZED HEALTH CARE EDUCATION/TRAINING PROGRAM

## 2023-11-02 PROCEDURE — G8427 DOCREV CUR MEDS BY ELIG CLIN: HCPCS | Performed by: STUDENT IN AN ORGANIZED HEALTH CARE EDUCATION/TRAINING PROGRAM

## 2023-11-02 PROCEDURE — 99214 OFFICE O/P EST MOD 30 MIN: CPT | Performed by: STUDENT IN AN ORGANIZED HEALTH CARE EDUCATION/TRAINING PROGRAM

## 2023-11-02 PROCEDURE — 1036F TOBACCO NON-USER: CPT | Performed by: STUDENT IN AN ORGANIZED HEALTH CARE EDUCATION/TRAINING PROGRAM

## 2023-11-02 PROCEDURE — G8417 CALC BMI ABV UP PARAM F/U: HCPCS | Performed by: STUDENT IN AN ORGANIZED HEALTH CARE EDUCATION/TRAINING PROGRAM

## 2023-11-02 ASSESSMENT — ENCOUNTER SYMPTOMS
GASTROINTESTINAL NEGATIVE: 1
ALLERGIC/IMMUNOLOGIC NEGATIVE: 1
EYES NEGATIVE: 1
RESPIRATORY NEGATIVE: 1

## 2023-11-02 NOTE — PROGRESS NOTES
wrist  suggesting carpal tunnel syndrome. These findings are based on  prolonged latencies seen both in the antidromic and orthodromic sensory  nerve recordings. 2.  Mild left median nerve neuropathy at the wrist suggesting carpal  tunnel syndrome. Laboratory Studies:   Lab Results   Component Value Date    WBC 8.5 10/02/2023    HGB 10.9 (L) 10/02/2023    HCT 38.0 10/02/2023    MCV 83.3 10/02/2023     (H) 10/02/2023     No results found for: \"SEDRATE\"  No results found for: \"CRP\"    Assessment:      Diagnosis Orders   1. Sprain of left wrist, initial encounter        2. Right carpal tunnel syndrome        3. Left carpal tunnel syndrome          Dolores Allen is a 50 y.o. female with a history and exam consistent with left wrist sprain versus nondisplaced distal radius fracture. She has underlying bilateral carpal tunnel syndrome. Plan:     She had an injury to the wrist 2 weeks ago when she fell. On her exam she has mild swelling over the hand and wrist with tenderness to palpation near the distal radius. Her x-rays do not show any acute fractures. Appropriate alignment of the carpus. I discussed with her that she either has a sprain of the wrist/tendons versus an occult nondisplaced distal radius fracture. Either way the treatment is the same. I would like for her to continue wearing her removable Velcro wrist.  She should ice and elevate for swelling and may use anti-inflammatory medication as directed. I would like to see her back in 4 weeks with repeat x-rays of the left wrist.  We discussed that if there is a nondisplaced fracture, this will show up on repeat radiographs. Her carpal tunnel syndrome is improving with nerve glide exercises and nighttime wrist bracing. She states agreement understand the plan. Her questions and concerns were answered. Follow-up in 4 weeks with repeat x-ray of the left wrist.    Return in about 4 weeks (around 11/30/2023).     Mary Alice Silva

## 2023-11-08 ENCOUNTER — HOSPITAL ENCOUNTER (OUTPATIENT)
Dept: PHYSICAL THERAPY | Age: 48
Setting detail: THERAPIES SERIES
Discharge: HOME | End: 2023-11-08
Payer: COMMERCIAL

## 2023-11-08 PROCEDURE — 97140 MANUAL THERAPY 1/> REGIONS: CPT

## 2023-11-08 PROCEDURE — 97110 THERAPEUTIC EXERCISES: CPT

## 2023-11-08 PROCEDURE — 97116 GAIT TRAINING THERAPY: CPT

## 2023-11-08 PROCEDURE — 97530 THERAPEUTIC ACTIVITIES: CPT

## 2023-11-08 ASSESSMENT — PAIN DESCRIPTION - DESCRIPTORS: DESCRIPTORS: ACHING;DISCOMFORT;SORE;TINGLING

## 2023-11-08 ASSESSMENT — PAIN DESCRIPTION - ORIENTATION: ORIENTATION: RIGHT;LEFT

## 2023-11-08 ASSESSMENT — PAIN DESCRIPTION - PAIN TYPE: TYPE: ACUTE PAIN;CHRONIC PAIN

## 2023-11-08 ASSESSMENT — PAIN SCALES - GENERAL: PAINLEVEL_OUTOF10: 9

## 2023-11-08 ASSESSMENT — PAIN DESCRIPTION - LOCATION: LOCATION: BACK;HIP;KNEE

## 2023-11-08 NOTE — PROGRESS NOTES
pt can amb 120ft with talll posutre and hands on Rollator before too painful and LE s fatigued and wanting to give out Limitations addressed: Mobility, Flexibility, Activity tolerance                   Modalities  Moist Heat: (CPT 30447) Treatment Reasoning    Patient Position: Prone  Moist heat location: Low back, Hip Limitations addressed: Pain modulation  Limitations addressed: guardign adn pain , pain dec form 9.5 to 8.5/10 per pt report Pt also reports used muscle relaxer to help some with pain today. Pt has finished second steroid  prescription                     Pt Education: PT Education: Goals, Plan of Care, Evaluative findings, Home Exercise Program  Patient Education: Reviewed HEP and importance of compliance with HEP, encouraged frequent short walks as tolerated and also encouraged upright posture with rollator as tolerated, added sit to stand x 5 to HEP, encouraged continued frequent changes in positions  Additional Comments: KT tape for hip, knee, back, posture       ASSESSMENT     Assessment: Assessment: Pt has met 2/4 STG and all LTG in porgress. No change in pain in low back, hips or knees, but imporved uprogihht posture nad increased transfer timeleiness and walking distance. Pt has not changed extenivley past month. ecommend dishcarge to HPE after one more PT session to review HEP and possibly signup for [facility]. If high pain and dec functional walking distance persists, may need to consider power w/c for home with high pain at end of day when weaker and also for community distance.   Body Structures, Functions, Activity Limitations Requiring Skilled Therapeutic Intervention: Decreased functional mobility , Decreased strength, Decreased endurance, Decreased posture, Increased pain, Decreased body mechanics, Decreased tolerance to work activity    Post-Treatment Pain Level:  8.5/10 low back,hips and knees    Activity Tolerance: Patient limited by pain    Therapy Prognosis: Justa Tian

## 2023-11-14 ENCOUNTER — TELEPHONE (OUTPATIENT)
Dept: FAMILY MEDICINE CLINIC | Age: 48
End: 2023-11-14

## 2023-11-14 NOTE — TELEPHONE ENCOUNTER
Pt called and stated that the pharmacy told her that the Fioricet needs a pre-authorization and that she needs to contact her Jaskaran Silva. [facility] in [place] is the pharmacy.

## 2023-11-17 ENCOUNTER — HOSPITAL ENCOUNTER (OUTPATIENT)
Dept: PHYSICAL THERAPY | Age: 48
Setting detail: THERAPIES SERIES
Discharge: HOME | End: 2023-11-17
Payer: COMMERCIAL

## 2023-11-17 DIAGNOSIS — G43.009 MIGRAINE WITHOUT AURA AND WITHOUT STATUS MIGRAINOSUS, NOT INTRACTABLE: Primary | ICD-10-CM

## 2023-11-17 PROCEDURE — 97140 MANUAL THERAPY 1/> REGIONS: CPT

## 2023-11-17 PROCEDURE — 97110 THERAPEUTIC EXERCISES: CPT

## 2023-11-17 PROCEDURE — 97116 GAIT TRAINING THERAPY: CPT

## 2023-11-17 ASSESSMENT — PAIN SCALES - GENERAL: PAINLEVEL_OUTOF10: 10

## 2023-11-17 ASSESSMENT — PAIN DESCRIPTION - LOCATION: LOCATION: BACK;KNEE;HIP

## 2023-11-17 ASSESSMENT — PAIN DESCRIPTION - ORIENTATION: ORIENTATION: RIGHT;LEFT

## 2023-11-17 ASSESSMENT — PAIN DESCRIPTION - PAIN TYPE: TYPE: ACUTE PAIN;CHRONIC PAIN

## 2023-11-17 NOTE — PROGRESS NOTES
Physical Therapy  Physical Therapy: Daily Note   Patient: Joe Ernandez (33 y.o. female)   Examination Date: 06/15/1821  Plan of Care/Certification Expiration Date: 23    Progress Note Counter: Recheck completed on 10/19/23 and plan to see pt 1x per week for 4 more weeks and then DC to HEP; Pt fell at home last night injuring her left hand and is sore in her right knee, hip and back and is planning to go to ER after her recheck assessment today so not able to tolerate some of the MMT testing. Pt to contact our dept if the injuries will limit her from progressing with PT.     :  1975 # of Visits since [facility]:   17   MRN: [id number]  CSN: [id number] Start of Care Date:   2023   Insurance: Payor: Donna Myers / Plan: Merlin Escobar EUGENIA / Product Type: *No Product type* /   Insurance ID: [id number] - (Commercial) Secondary Insurance (if applicable): [employer]   Referring Physician: Raliegh Mimes, MD Dr Ronell Spatz   PCP: GERARDO Rossi CNP Visits to Date/Visits Approved:     No Show/Cancelled Appts:      Medical Diagnosis: Carpal tunnel syndrome, right upper limb [G56.01]  Carpal tunnel syndrome, left upper limb [G56.02]    Treatment Diagnosis: LBP , BLE pain and BLE weakness with decreased functional mobility        SUBJECTIVE EXAMINATION   Pain Level: Pain Screening  Patient Currently in Pain: Yes  Pain Assessment: 0-10  Pain Level: 10  Pain Type: Acute pain, Chronic pain  Pain Location: Back, Knee, Hip  Pain Orientation: Right, Left  Pain Descriptors: Tightness, Spasm, Cramping    Patient Comments: Subjective: I feel so tight in my whole spine for the last 2-3 days. I sat in the shower and let the hot water hit my back. \"  Pt. states she is taking muscle relaxers for her pain as well. Pt. states she is limited by her pain today is a 10/10. \"i'm in so much pain. \"    HEP Compliance: Good        OBJECTIVE EXAMINATION   Restrictions:  No data recorded Required Braces or Orthoses?:

## 2023-11-17 NOTE — TELEPHONE ENCOUNTER
Since she is already taking Mobic every day we cannot add any NSAIDs to her regimen. Other the alternatives to Fioricet would need a visit to discuss.   Please have patient's schedule

## 2023-11-25 DIAGNOSIS — J45.40 MODERATE PERSISTENT ASTHMA WITHOUT COMPLICATION: ICD-10-CM

## 2023-11-27 RX ORDER — ALBUTEROL SULFATE 90 UG/1
2 AEROSOL, METERED RESPIRATORY (INHALATION) EVERY 6 HOURS PRN
Qty: 8.5 EACH | Refills: 2 | Status: SHIPPED | OUTPATIENT
Start: 2023-11-27

## 2023-11-27 NOTE — TELEPHONE ENCOUNTER
Comments:     Last Office Visit (last PCP visit):   10/31/2023    Next Visit Date:  Future Appointments   Date Time Provider [address]   2/12/2024  2:00 PM GERARDO Krishna  [address]       **If hasn't been seen in over a year OR hasn't followed up according to last diabetes/ADHD visit, make appointment for patient before sending refill to provider.     Rx requested:  Requested Prescriptions     Pending Prescriptions Disp Refills    albuterol sulfate HFA (PROVENTIL;VENTOLIN;PROAIR) 108 (90 Base) MCG/ACT inhaler [Pharmacy Med Name: ALBUTEROL HFA (PROAIR) INHALER] 8.5 each 2     Sig: INHALE 2 PUFFS INTO THE LUNGS EVERY 6 HOURS AS NEEDED FOR WHEEZING OR SHORTNESS OF BREATH Vaccine status unknown

## 2023-12-03 ENCOUNTER — HOSPITAL ENCOUNTER (EMERGENCY)
Age: 48
Discharge: HOME | End: 2023-12-03
Attending: EMERGENCY MEDICINE
Payer: COMMERCIAL

## 2023-12-03 VITALS
HEIGHT: 65 IN | TEMPERATURE: 98 F | HEART RATE: 98 BPM | WEIGHT: 293 LBS | OXYGEN SATURATION: 98 % | DIASTOLIC BLOOD PRESSURE: 88 MMHG | BODY MASS INDEX: 48.82 KG/M2 | RESPIRATION RATE: 16 BRPM | SYSTOLIC BLOOD PRESSURE: 137 MMHG

## 2023-12-03 DIAGNOSIS — M79.89 SWELLING OF RIGHT HAND: Primary | ICD-10-CM

## 2023-12-03 DIAGNOSIS — M79.641 RIGHT HAND PAIN: ICD-10-CM

## 2023-12-03 PROCEDURE — 99283 EMERGENCY DEPT VISIT LOW MDM: CPT

## 2023-12-03 PROCEDURE — 6370000000 HC RX 637 (ALT 250 FOR IP): Performed by: EMERGENCY MEDICINE

## 2023-12-03 PROCEDURE — 6360000002 HC RX W HCPCS: Performed by: EMERGENCY MEDICINE

## 2023-12-03 RX ORDER — HYDROCODONE BITARTRATE AND ACETAMINOPHEN 5; 325 MG/1; MG/1
1 TABLET ORAL EVERY 6 HOURS PRN
Qty: 10 TABLET | Refills: 0 | Status: SHIPPED | OUTPATIENT
Start: 2023-12-03 | End: 2023-12-06

## 2023-12-03 RX ORDER — DEXAMETHASONE 4 MG/1
8 TABLET ORAL ONCE
Status: COMPLETED | OUTPATIENT
Start: 2023-12-03 | End: 2023-12-03

## 2023-12-03 RX ORDER — HYDROCODONE BITARTRATE AND ACETAMINOPHEN 5; 325 MG/1; MG/1
1 TABLET ORAL ONCE
Status: COMPLETED | OUTPATIENT
Start: 2023-12-03 | End: 2023-12-03

## 2023-12-03 RX ADMIN — DEXAMETHASONE 8 MG: 4 TABLET ORAL at 15:51

## 2023-12-03 RX ADMIN — HYDROCODONE BITARTRATE AND ACETAMINOPHEN 1 TABLET: 5; 325 TABLET ORAL at 15:51

## 2023-12-03 ASSESSMENT — ENCOUNTER SYMPTOMS
SINUS PRESSURE: 0
DIARRHEA: 0
BACK PAIN: 0
EYE REDNESS: 0
TROUBLE SWALLOWING: 0
CHOKING: 0
COUGH: 0
VOICE CHANGE: 0
EYE PAIN: 0
SORE THROAT: 0
WHEEZING: 0
EYE DISCHARGE: 0
FACIAL SWELLING: 0
STRIDOR: 0
SHORTNESS OF BREATH: 0
ABDOMINAL PAIN: 0
CHEST TIGHTNESS: 0
VOMITING: 0
BLOOD IN STOOL: 0
CONSTIPATION: 0

## 2023-12-03 ASSESSMENT — PAIN DESCRIPTION - ORIENTATION: ORIENTATION: RIGHT

## 2023-12-03 ASSESSMENT — PAIN DESCRIPTION - LOCATION: LOCATION: HAND

## 2023-12-03 ASSESSMENT — PAIN DESCRIPTION - DESCRIPTORS: DESCRIPTORS: SHARP

## 2023-12-03 ASSESSMENT — PAIN DESCRIPTION - ONSET: ONSET: SUDDEN

## 2023-12-03 ASSESSMENT — PAIN DESCRIPTION - FREQUENCY: FREQUENCY: CONTINUOUS

## 2023-12-03 ASSESSMENT — PAIN DESCRIPTION - PAIN TYPE: TYPE: ACUTE PAIN

## 2023-12-03 ASSESSMENT — LIFESTYLE VARIABLES
HOW OFTEN DO YOU HAVE A DRINK CONTAINING ALCOHOL: NEVER
HOW MANY STANDARD DRINKS CONTAINING ALCOHOL DO YOU HAVE ON A TYPICAL DAY: PATIENT DOES NOT DRINK

## 2023-12-03 ASSESSMENT — PAIN - FUNCTIONAL ASSESSMENT: PAIN_FUNCTIONAL_ASSESSMENT: 0-10

## 2023-12-03 ASSESSMENT — PAIN SCALES - GENERAL: PAINLEVEL_OUTOF10: 8

## 2023-12-03 NOTE — ED PROVIDER NOTES
Musculoskeletal:  Positive for arthralgias and joint swelling. Negative for back pain, neck pain and neck stiffness. Skin:  Negative for pallor and rash. Neurological:  Negative for tremors, seizures, syncope, weakness, numbness and headaches. Hematological:  Negative for adenopathy. Does not bruise/bleed easily. Psychiatric/Behavioral:  Negative for agitation, behavioral problems, hallucinations and sleep disturbance. The patient is not hyperactive. All other systems reviewed and are negative. Except as noted above the remainder of the review of systems was reviewed and negative.        PAST MEDICAL HISTORY     Past Medical History:   Diagnosis Date    Anxiety     Asthma     Fibromyalgia 4/19/2023    GERD (gastroesophageal reflux disease) 4/19/2023    History of substance abuse ([address]) 10/10/2019    Perennial allergic rhinitis 10/10/2019    Primary osteoarthritis involving multiple joints 10/10/2019    Rupture of Achilles tendon 11/29/2018    Rupture of gastrocnemius muscle 11/29/2018         SURGICALHISTORY       Past Surgical History:   Procedure Laterality Date    TUBAL LIGATION  2003         CURRENT MEDICATIONS       Previous Medications    ALBUTEROL SULFATE HFA (PROVENTIL;VENTOLIN;PROAIR) 108 (90 BASE) MCG/ACT INHALER    INHALE 2 PUFFS INTO THE LUNGS EVERY 6 HOURS AS NEEDED FOR WHEEZING OR SHORTNESS OF BREATH    BUTALBITAL-APAP-CAFFEINE (FIORICET) -40 MG CAPS PER CAPSULE    Take 1 capsule by mouth every 4 hours as needed for Migraine (30)    CETIRIZINE (ZYRTEC) 10 MG TABLET    Take 1 tablet by mouth daily    CYCLOBENZAPRINE (FLEXERIL) 10 MG TABLET    TAKE 1 TABLET BY MOUTH 3 TIMES A DAY AS NEEDED FOR MUSCLE SPASM FOR 15 DAYS **LIMIT EXCEEDED**    DIAZEPAM (VALIUM) 5 MG TABLET    TAKE 1 TABLET BY MOUTH TWICE A DAY AS NEEDED FOR 7 DAYS    ELASTIC BANDAGES & SUPPORTS (WRIST SPLINT/COCK-UP/LEFT XL) MISC    Use daily as instructed    FERROUS SULFATE (IRON 325) 325 (65 FE) MG TABLET    Take 1

## 2023-12-04 ENCOUNTER — OFFICE VISIT (OUTPATIENT)
Dept: FAMILY MEDICINE CLINIC | Age: 48
End: 2023-12-04
Payer: COMMERCIAL

## 2023-12-04 VITALS
WEIGHT: 293 LBS | HEART RATE: 93 BPM | SYSTOLIC BLOOD PRESSURE: 130 MMHG | BODY MASS INDEX: 48.82 KG/M2 | DIASTOLIC BLOOD PRESSURE: 72 MMHG | OXYGEN SATURATION: 99 % | HEIGHT: 65 IN

## 2023-12-04 DIAGNOSIS — M54.41 CHRONIC BILATERAL LOW BACK PAIN WITH BILATERAL SCIATICA: Primary | ICD-10-CM

## 2023-12-04 DIAGNOSIS — G89.29 CHRONIC BILATERAL LOW BACK PAIN WITH BILATERAL SCIATICA: Primary | ICD-10-CM

## 2023-12-04 DIAGNOSIS — G43.009 MIGRAINE WITHOUT AURA AND WITHOUT STATUS MIGRAINOSUS, NOT INTRACTABLE: ICD-10-CM

## 2023-12-04 DIAGNOSIS — M54.42 CHRONIC BILATERAL LOW BACK PAIN WITH BILATERAL SCIATICA: Primary | ICD-10-CM

## 2023-12-04 DIAGNOSIS — G56.03 BILATERAL CARPAL TUNNEL SYNDROME: ICD-10-CM

## 2023-12-04 PROCEDURE — G8427 DOCREV CUR MEDS BY ELIG CLIN: HCPCS

## 2023-12-04 PROCEDURE — G8417 CALC BMI ABV UP PARAM F/U: HCPCS

## 2023-12-04 PROCEDURE — 1036F TOBACCO NON-USER: CPT

## 2023-12-04 PROCEDURE — G8484 FLU IMMUNIZE NO ADMIN: HCPCS

## 2023-12-04 PROCEDURE — 99213 OFFICE O/P EST LOW 20 MIN: CPT

## 2023-12-04 RX ORDER — LUMATEPERONE 42 MG/1
42 CAPSULE ORAL DAILY
COMMUNITY
Start: 2023-09-08 | End: 2023-12-04

## 2023-12-04 RX ORDER — CHOLECALCIFEROL (VITAMIN D3) 1250 MCG
1 CAPSULE ORAL WEEKLY
COMMUNITY
Start: 2023-10-13

## 2023-12-04 RX ORDER — ETODOLAC 600 MG/1
600 TABLET, EXTENDED RELEASE ORAL DAILY
COMMUNITY
Start: 2023-09-19 | End: 2023-12-04 | Stop reason: ALTCHOICE

## 2023-12-04 RX ORDER — DEXAMETHASONE 4 MG/1
2 TABLET ORAL 2 TIMES DAILY
COMMUNITY
Start: 2023-11-07

## 2023-12-04 RX ORDER — CARIPRAZINE 1.5 MG/1
1.5 CAPSULE, GELATIN COATED ORAL DAILY
COMMUNITY
Start: 2023-12-01

## 2023-12-04 RX ORDER — PROPRANOLOL HYDROCHLORIDE 40 MG/1
40 TABLET ORAL 2 TIMES DAILY
Qty: 60 TABLET | Refills: 0 | Status: SHIPPED | OUTPATIENT
Start: 2023-12-04 | End: 2024-01-03

## 2023-12-04 NOTE — PROGRESS NOTES
[address] [facility]          ASSESSMENT/PLAN     Janice Shepard is a 50 y.o. female who presents with:      1. Chronic bilateral low back pain with bilateral sciatica  Can not walk distances longer than 40-50 feet without developing severe pain in lower back and hips. Doing physical therapy on her own has completed round of sessions in Nov 2023. Pt still signing in to use the therapy room. Difficulty using walker for long distances due to severe leg pain and walking. Symptoms exacerbated by severe obesity BMI of 64.9. She has difficulty using traditional wheelchair nonpowered due to chronic wrist pain related to bilateral carpal tunnel syndrome. Orders placed for powered wheelchair        2. Migraine without aura and without status migrainosus, not intractable  Insurance will not cover Fioricet. Topamax, Lana Austin did not work. Lyrica was discontinued to due to side effects . Migraines with aura 4 times weekly no recent change in symptoms or frequency. Will trial propanolol twice daily have pt fu in two weeks. Advised to break tablets in half if she experiences dizziness. PATIENT REFERRED TO:  No follow-ups on file. DISCHARGE MEDICATIONS:  New Prescriptions    No medications on file     Cannot display discharge medications since this is not an admission. Catrachito Mistry, APRN - CNP    CHIEF COMPLAINT       Chief Complaint   Patient presents with    Migraine     Pt here today to discuss migraine medications. Insurance denied fioricet. wants a power wheelchair     Pt feels trapped in her home. She can't get around. She moves around her home on an office chair that has wheels. SUBJECTIVE/REVIEW OF SYSTEMS     Review of Systems   Constitutional: Negative. Negative for fatigue and fever. HENT: Negative. Negative for congestion. Eyes: Negative. Respiratory: Negative. Negative for cough and shortness of breath.     Cardiovascular:  Positive for leg

## 2023-12-05 ASSESSMENT — ENCOUNTER SYMPTOMS
BACK PAIN: 1
RESPIRATORY NEGATIVE: 1
SHORTNESS OF BREATH: 0
EYES NEGATIVE: 1
GASTROINTESTINAL NEGATIVE: 1
ALLERGIC/IMMUNOLOGIC NEGATIVE: 1
COUGH: 0
COLOR CHANGE: 0

## 2023-12-07 ENCOUNTER — HOSPITAL ENCOUNTER (OUTPATIENT)
Age: 48
Discharge: HOME | End: 2023-12-09
Payer: COMMERCIAL

## 2023-12-07 ENCOUNTER — HOSPITAL ENCOUNTER (OUTPATIENT)
Dept: GENERAL RADIOLOGY | Age: 48
Discharge: HOME | End: 2023-12-09
Payer: COMMERCIAL

## 2023-12-07 DIAGNOSIS — M25.532 LEFT WRIST PAIN: ICD-10-CM

## 2023-12-07 PROCEDURE — 73110 X-RAY EXAM OF WRIST: CPT

## 2023-12-08 ENCOUNTER — HOSPITAL ENCOUNTER (EMERGENCY)
Age: 48
Discharge: HOME | End: 2023-12-08
Payer: COMMERCIAL

## 2023-12-08 ENCOUNTER — APPOINTMENT (OUTPATIENT)
Dept: ULTRASOUND IMAGING | Age: 48
End: 2023-12-08
Payer: COMMERCIAL

## 2023-12-08 VITALS
TEMPERATURE: 98.9 F | HEART RATE: 91 BPM | OXYGEN SATURATION: 98 % | BODY MASS INDEX: 48.82 KG/M2 | HEIGHT: 65 IN | RESPIRATION RATE: 18 BRPM | SYSTOLIC BLOOD PRESSURE: 148 MMHG | WEIGHT: 293 LBS | DIASTOLIC BLOOD PRESSURE: 79 MMHG

## 2023-12-08 DIAGNOSIS — M79.604 RIGHT LEG PAIN: Primary | ICD-10-CM

## 2023-12-08 PROCEDURE — 99284 EMERGENCY DEPT VISIT MOD MDM: CPT

## 2023-12-08 PROCEDURE — 6370000000 HC RX 637 (ALT 250 FOR IP): Performed by: NURSE PRACTITIONER

## 2023-12-08 PROCEDURE — 93971 EXTREMITY STUDY: CPT

## 2023-12-08 RX ORDER — HYDROCODONE BITARTRATE AND ACETAMINOPHEN 5; 325 MG/1; MG/1
1 TABLET ORAL EVERY 4 HOURS PRN
Qty: 18 TABLET | Refills: 0 | Status: SHIPPED | OUTPATIENT
Start: 2023-12-08 | End: 2023-12-11

## 2023-12-08 RX ORDER — HYDROCODONE BITARTRATE AND ACETAMINOPHEN 5; 325 MG/1; MG/1
1 TABLET ORAL ONCE
Status: COMPLETED | OUTPATIENT
Start: 2023-12-08 | End: 2023-12-08

## 2023-12-08 RX ADMIN — HYDROCODONE BITARTRATE AND ACETAMINOPHEN 1 TABLET: 5; 325 TABLET ORAL at 12:46

## 2023-12-08 ASSESSMENT — ENCOUNTER SYMPTOMS
APNEA: 0
EYE PAIN: 0
BACK PAIN: 0
EYE DISCHARGE: 0
CHOKING: 0
SHORTNESS OF BREATH: 0
RHINORRHEA: 0
COUGH: 0
ABDOMINAL DISTENTION: 0
WHEEZING: 0
CONSTIPATION: 0
SINUS PRESSURE: 0
EYE REDNESS: 0
DIARRHEA: 0
STRIDOR: 0
SORE THROAT: 0
SINUS PAIN: 0
TROUBLE SWALLOWING: 0
NAUSEA: 0
PHOTOPHOBIA: 0
ABDOMINAL PAIN: 0
ALLERGIC/IMMUNOLOGIC NEGATIVE: 1
VOICE CHANGE: 0
VOMITING: 0
EYE ITCHING: 0
BLOOD IN STOOL: 0
COLOR CHANGE: 0
FACIAL SWELLING: 0
CHEST TIGHTNESS: 0

## 2023-12-08 ASSESSMENT — PAIN DESCRIPTION - PAIN TYPE: TYPE: ACUTE PAIN

## 2023-12-08 ASSESSMENT — PAIN DESCRIPTION - FREQUENCY: FREQUENCY: CONTINUOUS

## 2023-12-08 ASSESSMENT — PAIN DESCRIPTION - LOCATION
LOCATION: LEG
LOCATION: LEG

## 2023-12-08 ASSESSMENT — PAIN DESCRIPTION - DESCRIPTORS
DESCRIPTORS: BURNING;TEARING
DESCRIPTORS: BURNING

## 2023-12-08 ASSESSMENT — PAIN - FUNCTIONAL ASSESSMENT: PAIN_FUNCTIONAL_ASSESSMENT: 0-10

## 2023-12-08 ASSESSMENT — PAIN DESCRIPTION - ORIENTATION
ORIENTATION: RIGHT
ORIENTATION: RIGHT

## 2023-12-08 ASSESSMENT — PAIN SCALES - GENERAL
PAINLEVEL_OUTOF10: 9
PAINLEVEL_OUTOF10: 7

## 2023-12-08 NOTE — ED TRIAGE NOTES
Complains of a burning and tearing sensation on her calf. Pain is worse with movement and with flexion of her toes. Pain started 3 days ago.

## 2023-12-10 DIAGNOSIS — G43.009 MIGRAINE WITHOUT AURA AND WITHOUT STATUS MIGRAINOSUS, NOT INTRACTABLE: ICD-10-CM

## 2023-12-11 RX ORDER — PROPRANOLOL HYDROCHLORIDE 40 MG/1
40 TABLET ORAL 2 TIMES DAILY
Qty: 60 TABLET | Refills: 0 | OUTPATIENT
Start: 2023-12-11

## 2023-12-14 ENCOUNTER — TELEPHONE (OUTPATIENT)
Dept: ORTHOPEDIC SURGERY | Age: 48
End: 2023-12-14

## 2023-12-14 ENCOUNTER — OFFICE VISIT (OUTPATIENT)
Dept: ORTHOPEDIC SURGERY | Age: 48
End: 2023-12-14
Payer: COMMERCIAL

## 2023-12-14 DIAGNOSIS — S86.811A STRAIN OF RIGHT CALF MUSCLE: ICD-10-CM

## 2023-12-14 DIAGNOSIS — M77.8 LEFT WRIST TENDINITIS: Primary | ICD-10-CM

## 2023-12-14 PROCEDURE — G8484 FLU IMMUNIZE NO ADMIN: HCPCS | Performed by: STUDENT IN AN ORGANIZED HEALTH CARE EDUCATION/TRAINING PROGRAM

## 2023-12-14 PROCEDURE — 99214 OFFICE O/P EST MOD 30 MIN: CPT | Performed by: STUDENT IN AN ORGANIZED HEALTH CARE EDUCATION/TRAINING PROGRAM

## 2023-12-14 PROCEDURE — 1036F TOBACCO NON-USER: CPT | Performed by: STUDENT IN AN ORGANIZED HEALTH CARE EDUCATION/TRAINING PROGRAM

## 2023-12-14 PROCEDURE — G8427 DOCREV CUR MEDS BY ELIG CLIN: HCPCS | Performed by: STUDENT IN AN ORGANIZED HEALTH CARE EDUCATION/TRAINING PROGRAM

## 2023-12-14 PROCEDURE — G8417 CALC BMI ABV UP PARAM F/U: HCPCS | Performed by: STUDENT IN AN ORGANIZED HEALTH CARE EDUCATION/TRAINING PROGRAM

## 2023-12-14 RX ORDER — AMITRIPTYLINE HYDROCHLORIDE 50 MG/1
50 TABLET, FILM COATED ORAL
COMMUNITY
Start: 2023-12-08

## 2023-12-14 NOTE — TELEPHONE ENCOUNTER
Patient is claustrophobic and is requesting a valium for her MRI on 12/21/23. Pharmacy is [facility] in [place]. Please review and advise.

## 2023-12-14 NOTE — PROGRESS NOTES
Orthopedic Surgery and Sports Medicine    Subjective:      Patient ID: Ailyn Parra is a 50 y.o. female who presents today for:  Chief Complaint   Patient presents with    Follow-up     Follow up visit for Left wrist pain and x-rays results       HPI  Patricia Jones is a 60-year-old female presents today for follow-up evaluation of her left wrist pain. She saw me previously for this. She went to the emergency room for a fall on 10/19/2023. She had a previous EMG on 5/12/2023 showing mild carpal tunnel syndrome, which was in fact a little better compared to the previous study in 2020. She continues to report pain in the left wrist.  It is present on the volar surface of the wrist with radiation into the forearm and into the hand. She does report numbness and tingling in the left hand as well. More on the thumb, index, and middle finger. She has been wearing a brace. She does have symptoms in the right hand as well, but not as worse as the left. She also has a second new complaint today. This is right leg pain. She was just seen in the emergency room on 12/8/2023 for it. She reports pain along the right posterior lower leg/ankle region for 1 week. She did not have any significant injury. She states that it is a burning sensation. It is worse with walking. She had a venous duplex done when she was in the emergency room which was negative for DVT. Previous treatment: Anti-inflammatory medication, wrist braces, Flexeril, occupational therapy  NSAIDs: Yes, meloxicam and ibuprofen  Occupational/Physical therapy: Yes, 18 visits      Hand Dominance: Ambidextrous  Occupation:   Workers Compensation:   Have you missed work for this issue? No  Is this issue being addressed under a worker's compensation claim?  No    Past Medical History:   Diagnosis Date    Anxiety     Asthma     Fibromyalgia 4/19/2023    GERD (gastroesophageal reflux disease) 4/19/2023    History of substance abuse ([address]) 10/10/2019

## 2023-12-19 RX ORDER — FLUTICASONE PROPIONATE AND SALMETEROL XINAFOATE 115; 21 UG/1; UG/1
2 AEROSOL, METERED RESPIRATORY (INHALATION) 2 TIMES DAILY
Qty: 12 EACH | Refills: 3 | OUTPATIENT
Start: 2023-12-19

## 2023-12-24 ENCOUNTER — HOSPITAL ENCOUNTER (EMERGENCY)
Age: 48
Discharge: HOME | End: 2023-12-24
Attending: EMERGENCY MEDICINE
Payer: COMMERCIAL

## 2023-12-24 VITALS
BODY MASS INDEX: 48.82 KG/M2 | DIASTOLIC BLOOD PRESSURE: 99 MMHG | WEIGHT: 293 LBS | SYSTOLIC BLOOD PRESSURE: 149 MMHG | HEIGHT: 65 IN | RESPIRATION RATE: 20 BRPM | TEMPERATURE: 98.5 F | HEART RATE: 100 BPM | OXYGEN SATURATION: 98 %

## 2023-12-24 VITALS
OXYGEN SATURATION: 99 % | HEIGHT: 65 IN | BODY MASS INDEX: 48.82 KG/M2 | RESPIRATION RATE: 20 BRPM | HEART RATE: 90 BPM | DIASTOLIC BLOOD PRESSURE: 101 MMHG | SYSTOLIC BLOOD PRESSURE: 163 MMHG | WEIGHT: 293 LBS | TEMPERATURE: 97.6 F

## 2023-12-24 DIAGNOSIS — M79.661 RIGHT CALF PAIN: Primary | ICD-10-CM

## 2023-12-24 DIAGNOSIS — M79.604 MUSCULOSKELETAL PAIN OF LOWER EXTREMITY, RIGHT: Primary | ICD-10-CM

## 2023-12-24 PROCEDURE — 6360000002 HC RX W HCPCS: Performed by: EMERGENCY MEDICINE

## 2023-12-24 PROCEDURE — 99284 EMERGENCY DEPT VISIT MOD MDM: CPT

## 2023-12-24 PROCEDURE — 99282 EMERGENCY DEPT VISIT SF MDM: CPT

## 2023-12-24 PROCEDURE — 96372 THER/PROPH/DIAG INJ SC/IM: CPT

## 2023-12-24 RX ORDER — OXYCODONE HYDROCHLORIDE AND ACETAMINOPHEN 5; 325 MG/1; MG/1
1 TABLET ORAL EVERY 6 HOURS PRN
Qty: 12 TABLET | Refills: 0 | Status: SHIPPED | OUTPATIENT
Start: 2023-12-24 | End: 2023-12-27

## 2023-12-24 RX ORDER — ORPHENADRINE CITRATE 100 MG/1
100 TABLET, EXTENDED RELEASE ORAL 2 TIMES DAILY
Qty: 20 TABLET | Refills: 0 | Status: SHIPPED | OUTPATIENT
Start: 2023-12-24 | End: 2024-01-03

## 2023-12-24 RX ORDER — ORPHENADRINE CITRATE 30 MG/ML
60 INJECTION INTRAMUSCULAR; INTRAVENOUS ONCE
Status: COMPLETED | OUTPATIENT
Start: 2023-12-24 | End: 2023-12-24

## 2023-12-24 RX ORDER — KETOROLAC TROMETHAMINE 30 MG/ML
60 INJECTION, SOLUTION INTRAMUSCULAR; INTRAVENOUS ONCE
Status: COMPLETED | OUTPATIENT
Start: 2023-12-24 | End: 2023-12-24

## 2023-12-24 RX ORDER — KETOROLAC TROMETHAMINE 10 MG/1
10 TABLET, FILM COATED ORAL EVERY 6 HOURS PRN
Qty: 20 TABLET | Refills: 0 | Status: SHIPPED | OUTPATIENT
Start: 2023-12-24

## 2023-12-24 RX ADMIN — KETOROLAC TROMETHAMINE 60 MG: 60 INJECTION, SOLUTION INTRAMUSCULAR at 05:38

## 2023-12-24 RX ADMIN — ORPHENADRINE CITRATE 60 MG: 60 INJECTION INTRAMUSCULAR; INTRAVENOUS at 05:38

## 2023-12-24 NOTE — ED PROVIDER NOTES
[address] [facility] ED  eMERGENCY dEPARTMENT eNCOUnter      Pt Name: Luz Metz  MRN: [id number]  [facility] 1975  Date of evaluation: 12/24/2023  Provider: Ellen Finley MD    CHIEF COMPLAINT       Chief Complaint   Patient presents with    Leg Pain     Right leg was seen here a few weeks ago for the same thing pt has had no improvement          HISTORY OF PRESENT ILLNESS   (Location/Symptom, Timing/Onset,Context/Setting, Quality, Duration, Modifying Factors, Severity)  Note limiting factors. Luz Metz is a 50 y.o. female who presents to the emergency department with complaint of cramping pain of right lower leg ongoing for several months. Patient was seen here few weeks ago and problem ruled out for DVT. She also followed up with orthopedic and they recommended physical therapy. She is on chronic Flexeril therapy. Has a pending appointment for chronic pain management. She is morbidly obese with BMI of 65.73. Denies injuries. Denies any other systemic symptoms. Pain is 9 in a scale of 1-10 and worse with movement, ambulation and weightbearing. HPI    Nursing Notes were reviewed. REVIEW OF SYSTEMS    (2-9 systems for level 4, 10 or more for level 5)     Review of Systems   Constitutional: Negative. Negative for activity change, appetite change, chills, fatigue and fever. HENT:  Negative for congestion, ear discharge, ear pain, hearing loss, rhinorrhea, sinus pressure and sore throat. Eyes:  Negative for photophobia, pain and visual disturbance. Respiratory:  Negative for apnea, cough, shortness of breath and wheezing. Cardiovascular:  Negative for chest pain, palpitations and leg swelling. Gastrointestinal:  Negative for abdominal distention, abdominal pain, constipation, diarrhea, nausea and vomiting. Endocrine: Negative for cold intolerance, heat intolerance and polyuria. Genitourinary:  Negative for dysuria, flank pain, frequency and urgency.    Musculoskeletal:

## 2023-12-24 NOTE — ED NOTES
Unable to fit walking boot onto right foot/calf. Provider aware.  Electronically signed by Alpesh Lewis RN on 12/24/2023 at 6:03 PM

## 2023-12-24 NOTE — ED TRIAGE NOTES
Patient arrived via private car and no family. Patient was just seen in [place] and she was given medication and discharged. Patient states the pain is in her r leg in the calf primarily and sometimes the shin. There is increased pain when she puts her foot down.  Patient states it feels like the worst muscle spasm you could have

## 2023-12-26 ENCOUNTER — TELEPHONE (OUTPATIENT)
Dept: ORTHOPEDIC SURGERY | Age: 48
End: 2023-12-26

## 2023-12-26 NOTE — TELEPHONE ENCOUNTER
Patient called in stating she no longer wishes to continue care with Dr. Ivis Olea, requesting to see Dr. Benitez Holliday. Confirmed with Antavo Eleno this is okay. LVM for patient to call back to get onto Dr. Lennox Spurr schedule.

## 2024-01-23 ENCOUNTER — OFFICE VISIT (OUTPATIENT)
Dept: ORTHOPEDIC SURGERY | Age: 49
End: 2024-01-23
Payer: COMMERCIAL

## 2024-01-23 VITALS
WEIGHT: 293 LBS | HEART RATE: 92 BPM | TEMPERATURE: 97.1 F | BODY MASS INDEX: 48.82 KG/M2 | HEIGHT: 65 IN | OXYGEN SATURATION: 98 %

## 2024-01-23 DIAGNOSIS — M76.61 ACHILLES TENDINITIS, RIGHT LEG: ICD-10-CM

## 2024-01-23 DIAGNOSIS — R52 PAIN: Primary | ICD-10-CM

## 2024-01-23 DIAGNOSIS — G56.03 CARPAL TUNNEL SYNDROME, BILATERAL: ICD-10-CM

## 2024-01-23 PROCEDURE — 1036F TOBACCO NON-USER: CPT | Performed by: ORTHOPAEDIC SURGERY

## 2024-01-23 PROCEDURE — 99215 OFFICE O/P EST HI 40 MIN: CPT | Performed by: ORTHOPAEDIC SURGERY

## 2024-01-23 PROCEDURE — G8417 CALC BMI ABV UP PARAM F/U: HCPCS | Performed by: ORTHOPAEDIC SURGERY

## 2024-01-23 PROCEDURE — G8427 DOCREV CUR MEDS BY ELIG CLIN: HCPCS | Performed by: ORTHOPAEDIC SURGERY

## 2024-01-23 PROCEDURE — G8484 FLU IMMUNIZE NO ADMIN: HCPCS | Performed by: ORTHOPAEDIC SURGERY

## 2024-01-23 RX ORDER — CHLORHEXIDINE GLUCONATE 4 G/100ML
SOLUTION TOPICAL
Qty: 118 ML | Refills: 0 | Status: SHIPPED | OUTPATIENT
Start: 2024-01-23

## 2024-01-23 RX ORDER — CARIPRAZINE 4.5 MG/1
4.5 CAPSULE, GELATIN COATED ORAL EVERY MORNING
COMMUNITY
Start: 2023-12-27

## 2024-01-23 NOTE — PROGRESS NOTES
0  --------------------------------------------------------------------------------------------------------------    Kedar Doty DO  Orthopedic and Spine Surgeon  [facility]  [phone]

## 2024-01-29 ENCOUNTER — PREP FOR PROCEDURE (OUTPATIENT)
Dept: ORTHOPEDIC SURGERY | Age: 49
End: 2024-01-29

## 2024-01-29 ENCOUNTER — TELEPHONE (OUTPATIENT)
Dept: ORTHOPEDIC SURGERY | Age: 49
End: 2024-01-29

## 2024-01-29 DIAGNOSIS — G56.02 CARPAL TUNNEL SYNDROME, LEFT: ICD-10-CM

## 2024-02-08 ENCOUNTER — HOSPITAL ENCOUNTER (OUTPATIENT)
Dept: PHYSICAL THERAPY | Age: 49
Setting detail: THERAPIES SERIES
Discharge: HOME | End: 2024-02-08
Payer: COMMERCIAL

## 2024-02-08 PROCEDURE — 97140 MANUAL THERAPY 1/> REGIONS: CPT

## 2024-02-08 PROCEDURE — 97162 PT EVAL MOD COMPLEX 30 MIN: CPT

## 2024-02-08 PROCEDURE — 97110 THERAPEUTIC EXERCISES: CPT

## 2024-02-08 ASSESSMENT — PAIN SCALES - GENERAL: PAINLEVEL_OUTOF10: 1

## 2024-02-08 ASSESSMENT — PAIN DESCRIPTION - LOCATION: LOCATION: ANKLE

## 2024-02-08 ASSESSMENT — PAIN DESCRIPTION - DESCRIPTORS: DESCRIPTORS: BURNING;TIGHTNESS;THROBBING

## 2024-02-08 ASSESSMENT — PAIN DESCRIPTION - ORIENTATION: ORIENTATION: RIGHT

## 2024-02-08 NOTE — PROGRESS NOTES
Signature:  ___________________________   Date:_______                                                                   Kedar Doty DO        Physician Comments: _______________________________________________    Please sign and return to [facility] PHYSICAL THERAPY.  Please fax to the location listed below. THANK YOU for this referral!    [facility] PHYSICAL THERAPY  [address] [place] [address]  Dept: [phone]  Dept Fax: [phone]  Loc: [phone]       POC NOTE

## 2024-02-16 ENCOUNTER — HOSPITAL ENCOUNTER (OUTPATIENT)
Dept: PHYSICAL THERAPY | Age: 49
Setting detail: THERAPIES SERIES
Discharge: HOME | End: 2024-02-16
Payer: COMMERCIAL

## 2024-02-16 PROCEDURE — 97140 MANUAL THERAPY 1/> REGIONS: CPT

## 2024-02-16 PROCEDURE — 97110 THERAPEUTIC EXERCISES: CPT

## 2024-02-16 NOTE — PROGRESS NOTES
back on the ground when walking\"  Short Term Goals Completed by 2 wks Current Status Goal Status   Pt report any decrease in R ankle/lower leg pain during standing /walking 11/8/23 PT reports HEP daily- spaced out for better tolerance and less flare; pain is persiting in back , hips and knees Partially met   Pt report compliance with HEP 5/7 days per wk or more frequently 8/29/23: stand unsupported 47 sec prior to needing to sit; 11/8/23 35.2 sec before sitting = last PT test of the recheck/discharge Partially met   Improve AROM ankle df by 10 deg or greater (currently lacking 20 deg from neutral position) 11/8/23 pt can amb 120ft with talll posutre and hands on Rollator before too painful and LE s fatigued and wanting to give out Met   Pt demonstrate heel strike R LE at least 25% during amb 120 feet with appropriate AD (rollator or crutches)- pt currently amb on toes/metheads, with no heel strike R LE during amb 11/8/23 Daily- spread out Met                                                       Long Term Goals Completed by 6-8 weeks or 12 -14 visits Current Status Goal Status   Pt report 4/10 R achilles pain or less during amb and ADLs at least 50% of the day Pain remains 9-10 majority of day  and night in back,hips and knees - equal left and right Not Met   Improve AROM R ankle 5 deg df, 30 deg inv and 15 deg ev or more to improve heel strike during gait. 11/8/23 pt can amb 120ft with talll posutre and hands on ROllator before too painful and LE s fatigued and wanting to give out paipn post >= 9/10 Partially met, In progress   Improve R ankle strength 4/5  (within available ROM) or greater to improve weightbearing tolerance and improve ease of ADLs 11/8/23 tolerating mod resistance seated avaialble AORM with hip flex to approx 10 deg B, gradually increasing In progress   Pt amb 200 feet or greater with appropriate AD (crutches or rollator) demonstrating R LE heelstrike > 50% of the distance and pain 5/10 or less

## 2024-02-20 DIAGNOSIS — J45.40 MODERATE PERSISTENT ASTHMA WITHOUT COMPLICATION: ICD-10-CM

## 2024-02-20 NOTE — TELEPHONE ENCOUNTER
Comments:     Last Office Visit (last PCP visit):   12/18/2023    Next Visit Date:  Future Appointments   Date Time Provider Department Center   2/21/2024  9:30 AM Angi Montemayor PTA [facility]   2/23/2024  9:30 AM Angi Montemayor, PTA [facility]   2/27/2024 11:00 AM Brooke Sequeira PTA [facility]  [facility] [place]   3/1/2024 10:15 AM Angi Montemayor PTA [facility]  [facility]   3/25/2024  9:30 AM Luke Gamble PA [facility]   4/11/2024  9:30 AM Luke Gamble PA [facility]   5/8/2024  4:00 PM Garrett Souza MD [facility] -       **If hasn't been seen in over a year OR hasn't followed up according to last diabetes/ADHD visit, make appointment for patient before sending refill to provider.    Rx requested:  Requested Prescriptions     Pending Prescriptions Disp Refills    albuterol sulfate HFA (PROVENTIL;VENTOLIN;PROAIR) 108 (90 Base) MCG/ACT inhaler [Pharmacy Med Name: ALBUTEROL HFA (PROAIR) INHALER] 8.5 each 2     Sig: INHALE 2 PUFFS INTO THE LUNGS EVERY 6 HOURS AS NEEDED FOR WHEEZING OR SHORTNESS OF BREATH

## 2024-02-21 ENCOUNTER — HOSPITAL ENCOUNTER (OUTPATIENT)
Dept: PHYSICAL THERAPY | Age: 49
Setting detail: THERAPIES SERIES
Discharge: HOME | End: 2024-02-21
Payer: COMMERCIAL

## 2024-02-21 PROCEDURE — 97140 MANUAL THERAPY 1/> REGIONS: CPT

## 2024-02-21 PROCEDURE — 97035 APP MDLTY 1+ULTRASOUND EA 15: CPT

## 2024-02-21 PROCEDURE — 97110 THERAPEUTIC EXERCISES: CPT

## 2024-02-21 RX ORDER — ALBUTEROL SULFATE 90 UG/1
2 AEROSOL, METERED RESPIRATORY (INHALATION) EVERY 6 HOURS PRN
Qty: 8.5 EACH | Refills: 2 | Status: SHIPPED | OUTPATIENT
Start: 2024-02-21

## 2024-02-21 NOTE — PROGRESS NOTES
Physical Therapy  Daily Treatment Note  Date: 2024  Patient Name: Dahlia Quinn  MRN: [id number]     :   1975    Referring Physician: Kedar Doty DO Dr. Balk   PCP: Td Hatfield, APRN - CNP    Medical Diagnosis: Tendonitis, Achilles, right [M76.61]    Treatment Diagnosis: R achilles tendonitis, decreased and painful ARoM, weakness      Insurance: Payor: [facility] / Plan: [facility] MEDICAID / Product Type: *No Product type* /   Insurance ID: [id number] - (Medicaid Managed)    Subjective:   General  Referring Provider (secondary): Dr. Doty  PT Visit Information  Onset Date: 23  Total # of Visits Approved:  ()  Plan of Care/Certification Expiration Date: 24  No Show: 1  Canceled Appointment: 0  Referring Provider (secondary): Dr. Doty  Subjective  Subjective: Pt. states her pain is a 10/10 in R achilles.  Pt. states she has been trying to use the cane to walk as is having surgery on the   for her carpal tunnel.  Pt. states she is going to have to use cane post surgery so is trying to use it in R hand as is having surgery on L wrist.       Treatment Activities:  Exercises:      Treatment Reasoning    Exercise 1: AROM ankle df/pf x 15  Exercise 2: AROM ankle inv/ev x 10  Exercise 3: ankle circles cw and ccw x 10  Exercise 4: gastroc stretch long sitting H 30 sec x 3                          Gait: (CPT 40284)  Treatment Reasoning    Gait Training 1: amb SPC 20' focus on attempting inc R heel strike.  Heel not touching floor encouraged inc R step length for heelstrike. Pain 10/10                     Manual Therapy: (CPT 72232) Treatment Reasoning     Soft Tissue Mobilizaton: IASTM to L gastroc, achilles and PF to dec tightness mod restriction noted good tolerance pain dec from 10 to 9 post  Other: KT tape 2 strips proximal calf with distal pull 50% tension over achilles through met heads.  I strip max tension across achilles and X across PF max tension to dec tightness

## 2024-02-23 ENCOUNTER — HOSPITAL ENCOUNTER (OUTPATIENT)
Dept: PHYSICAL THERAPY | Age: 49
Setting detail: THERAPIES SERIES
Discharge: HOME | End: 2024-02-23
Payer: COMMERCIAL

## 2024-02-23 PROCEDURE — 97110 THERAPEUTIC EXERCISES: CPT

## 2024-02-23 PROCEDURE — 97035 APP MDLTY 1+ULTRASOUND EA 15: CPT

## 2024-02-23 PROCEDURE — 97140 MANUAL THERAPY 1/> REGIONS: CPT

## 2024-02-23 NOTE — PROGRESS NOTES
Physical Therapy    Daily Treatment Note  Date: 2024  Patient Name: Dahlia Quinn  MRN: [id number]     :   1975    Referring Physician: Kedar Doty DO Dr. Balk   PCP: Td Hatfield, APRN - CNP    Medical Diagnosis: Tendonitis, Achilles, right [M76.61]    Treatment Diagnosis: R achilles tendonitis, decreased and painful ARoM, weakness      Insurance: Payor: [facility] / Plan: [facility] MEDICAID / Product Type: *No Product type* /   Insurance ID: [id number] - (Medicaid Managed)    Subjective:   General  Referring Provider (secondary): Dr. Doty  PT Visit Information  Onset Date: 23  Total # of Visits Approved:  (12-14)  Total # of Visits to Date: 3  Plan of Care/Certification Expiration Date: 24  No Show: 1  Canceled Appointment: 0  Referring Provider (secondary): Dr. Doty  Subjective  Subjective: Pt. states she went to [facility] in pool trying to walk in water to get heelstrike.  Pt. states pain 9-10/10.  Currently 7/10 post 3 Tyelonol.       Treatment Activities:  Exercises:      Treatment Reasoning    Exercise 1: seated with feet on stool DF/PF and INV/EV x 10 hold 3 sec  Exercise 2: seated with feet on stool attempting pronation of foot as in supination x 10  Exercise 4: gastroc stretch long sitting H 30 sec x 3  Exercise 5: educated prone lying foot blocked by wall for low load long duration gastroc stretch  Exercise 6: educated standing at counter supported with feet flat and flexing top half over counter for posterior chain stretch.                          Manual Therapy: (CPT 10768) Treatment Reasoning     Joint Mobilization: PROM/Stretch to R gastroc and ankle to inc mobility  Soft Tissue Mobilizaton: IASTM to L gastroc, achilles and PF to dec tightness mod restriction noted good tolerance  Other: KT tape 2 strips proximal calf with distal pull 50% tension over achilles through met heads.  I strip max tension across achilles and X across PF max tension to dec tightness

## 2024-02-25 DIAGNOSIS — G89.29 CHRONIC BILATERAL LOW BACK PAIN WITH BILATERAL SCIATICA: ICD-10-CM

## 2024-02-25 DIAGNOSIS — M54.41 CHRONIC BILATERAL LOW BACK PAIN WITH BILATERAL SCIATICA: ICD-10-CM

## 2024-02-25 DIAGNOSIS — M15.9 PRIMARY OSTEOARTHRITIS INVOLVING MULTIPLE JOINTS: ICD-10-CM

## 2024-02-25 DIAGNOSIS — M54.42 CHRONIC BILATERAL LOW BACK PAIN WITH BILATERAL SCIATICA: ICD-10-CM

## 2024-02-26 RX ORDER — MELOXICAM 15 MG/1
15 TABLET ORAL DAILY
Qty: 30 TABLET | Refills: 5 | Status: SHIPPED | OUTPATIENT
Start: 2024-02-26

## 2024-02-26 NOTE — TELEPHONE ENCOUNTER
Comments:     Last Office Visit (last PCP visit):   12/18/2023    Next Visit Date:  Future Appointments   Date Time Provider Department Center   2/27/2024 11:00 AM Brooke Sequeira PTA [facility]  [facility]   2/27/2024 12:30 PM Td Hatfield, APRN - CNP [facility]   3/1/2024 10:15 AM Angi Montemayor PTA [facility]  [facility]   3/25/2024  9:30 AM Luke Gamble PA [facility]   4/11/2024  9:30 AM Luke Gamble PA [facility]   5/8/2024  4:00 PM Garrett Souza MD [facility] -       **If hasn't been seen in over a year OR hasn't followed up according to last diabetes/ADHD visit, make appointment for patient before sending refill to provider.    Rx requested:  Requested Prescriptions     Pending Prescriptions Disp Refills    meloxicam (MOBIC) 15 MG tablet [Pharmacy Med Name: MELOXICAM 15 MG TABLET] 30 tablet 5     Sig: TAKE 1 TABLET BY MOUTH EVERY DAY

## 2024-02-27 ENCOUNTER — HOSPITAL ENCOUNTER (OUTPATIENT)
Dept: PHYSICAL THERAPY | Age: 49
Setting detail: THERAPIES SERIES
Discharge: HOME | End: 2024-02-27
Payer: COMMERCIAL

## 2024-02-27 NOTE — PROGRESS NOTES
Physical Therapy: Daily Note   Patient: Dahlia Quinn (48 y.o. female)   Examination Date: 2024  Plan of Care/Certification Expiration Date: 24    Progress Note Counter: Recheck completed on 10/19/23 and plan to see pt 1x per week for 4 more weeks and then DC to HEP; Pt fell at home last night injuring her left hand and is sore in her right knee, hip and back and is planning to go to ER after her recheck assessment today so not able to tolerate some of the MMT testing. Pt to contact our dept if the injuries will limit her from progressing with PT.     :  1975 # of Visits since SOC:   19   MRN: [id number]  CSN: [id number] Start of Care Date:   2023   Ins[facility]: [facility] / Plan: [employer] [place] MEDICA[place] / Product Type: *No Product type* /   Insurance ID: [id number] - (Medicaid Managed) Secondary Insurance (if applicable):    Referring Physician: Kedar Doty DO Dr. Balk   PCP: Td Hatfield APRN - CNP Visits to Date/Visits Approved: 3 / 14    No Show/Cancelled Appts:      Medical Diagnosis: Tendonitis, Achilles, right [M76.61]    Treatment Diagnosis: R achilles tendonitis, decreased and painful ARoM, weakness        SUBJECTIVE EXAMINATION       Patient Comments: Subjective: Pt. called to cancel appointment with no reason given.      TREATMENT PLAN   Plan Frequency: 1-2 xper wk  Plan weeks: 6-8 wks  Current Treatment Recommendations: Strengthening, ROM, Balance training, Gait training, Functional mobility training, Transfer training, Pain management, Home exercise program, Positioning, Safety education & training, Therapeutic activities, Modalities, Patient/Caregiver education & training, Endurance training  Modalities: E-stim - unattended, Heat/Cold, Ultrasound   Additional Comments: KT tape       Therapy Time  Individual Time In: 1100        Individual Time Out:    Minutes:     Canceled      Electronically signed by Brooke Sequeira PTA  on 2024 at 10:58 AM   POC

## 2024-03-01 ENCOUNTER — HOSPITAL ENCOUNTER (OUTPATIENT)
Dept: PHYSICAL THERAPY | Age: 49
Setting detail: THERAPIES SERIES
Discharge: HOME | End: 2024-03-01
Payer: COMMERCIAL

## 2024-03-01 PROCEDURE — 97035 APP MDLTY 1+ULTRASOUND EA 15: CPT

## 2024-03-01 PROCEDURE — 97140 MANUAL THERAPY 1/> REGIONS: CPT

## 2024-03-01 PROCEDURE — 97110 THERAPEUTIC EXERCISES: CPT

## 2024-03-01 ASSESSMENT — PAIN SCALES - GENERAL: PAINLEVEL_OUTOF10: 7

## 2024-03-01 ASSESSMENT — PAIN DESCRIPTION - ORIENTATION: ORIENTATION: RIGHT

## 2024-03-01 ASSESSMENT — PAIN DESCRIPTION - LOCATION: LOCATION: ANKLE

## 2024-03-01 ASSESSMENT — PAIN DESCRIPTION - PAIN TYPE: TYPE: CHRONIC PAIN

## 2024-03-05 ENCOUNTER — HOSPITAL ENCOUNTER (OUTPATIENT)
Dept: PHYSICAL THERAPY | Age: 49
Setting detail: THERAPIES SERIES
Discharge: HOME | End: 2024-03-05
Payer: COMMERCIAL

## 2024-03-05 PROCEDURE — 97140 MANUAL THERAPY 1/> REGIONS: CPT

## 2024-03-05 PROCEDURE — 97116 GAIT TRAINING THERAPY: CPT

## 2024-03-05 PROCEDURE — 97035 APP MDLTY 1+ULTRASOUND EA 15: CPT

## 2024-03-05 NOTE — PROGRESS NOTES
Physical Therapy  Daily Treatment Note  Date: 3/5/2024  Patient Name: Dahlia Quinn  MRN: [id number]     :   1975    Referring Physician: Kedar Doty DO Dr. Balk   PCP: Td Hatfield, APRN - CNP    Medical Diagnosis: Tendonitis, Achilles, right [M76.61]    Treatment Diagnosis: R achilles tendonitis, decreased and painful ARoM, weakness      Insurance: Payor: [facility] / Plan: [facility] MEDICAID / Product Type: *No Product type* /   Insurance ID: [id number] - (Medicaid Managed)    Subjective:   General  Referring Provider (secondary): Dr. Doty  PT Visit Information  Onset Date: 23  Total # of Visits Approved: 14  Total # of Visits to Date: 5  Plan of Care/Certification Expiration Date: 24  No Show: 1  Canceled Appointment: 1  Progress Note Counter: Recheck completed on 10/19/23 and plan to see pt 1x per week for 4 more weeks and then DC to HEP; Pt fell at home last night injuring her left hand and is sore in her right knee, hip and back and is planning to go to ER after her recheck assessment today so not able to tolerate some of the MMT testing. Pt to contact our dept if the injuries will limit her from progressing with PT.  Referring Provider (secondary): Dr. Doty  Subjective  Subjective: Pt. states she has been trying to walk with SPC and states it has her R gastroc flared 9/10 pain currently.       Treatment Activities:  Exercises:      Treatment Reasoning    Exercise 4: gastroc stretch long sitting H 30 sec x 3                          Gait: (CPT 66647)  Treatment Reasoning    Gait Training 1: amb with SPC R and forearm crutch L as is having L carpal tunnel surgery at the end of the mo.  Demos 4 point pattern with some reports of unsteadiness. 30' distance limited with 9/10 pain.   reports use of crutches at home.  Trial forearm crutch and regular crutch next session. Limitations addressed: Mobility, Flexibility, Activity tolerance                   Manual Therapy: (CPT 35657)

## 2024-03-11 ENCOUNTER — HOSPITAL ENCOUNTER (OUTPATIENT)
Dept: PHYSICAL THERAPY | Age: 49
Setting detail: THERAPIES SERIES
Discharge: HOME | End: 2024-03-11
Payer: COMMERCIAL

## 2024-03-11 PROCEDURE — 97110 THERAPEUTIC EXERCISES: CPT

## 2024-03-11 PROCEDURE — 97035 APP MDLTY 1+ULTRASOUND EA 15: CPT

## 2024-03-11 PROCEDURE — 97530 THERAPEUTIC ACTIVITIES: CPT

## 2024-03-11 PROCEDURE — 97140 MANUAL THERAPY 1/> REGIONS: CPT

## 2024-03-11 ASSESSMENT — PAIN DESCRIPTION - DESCRIPTORS: DESCRIPTORS: BURNING;TIGHTNESS

## 2024-03-11 ASSESSMENT — PAIN SCALES - GENERAL: PAINLEVEL_OUTOF10: 7

## 2024-03-11 ASSESSMENT — PAIN DESCRIPTION - LOCATION: LOCATION: ANKLE

## 2024-03-11 NOTE — PROGRESS NOTES
Physical Therapy: Monthly Recheck   Patient: Dahlia DUVALL Workman (48 y.o. female)   Examination Date: 2024  Plan of Care/Certification Expiration Date: 24    Progress Note Counter: Recheck completed on 10/19/23 and plan to see pt 1x per week for 4 more weeks and then DC to HEP; Pt fell at home last night injuring her left hand and is sore in her right knee, hip and back and is planning to go to ER after her recheck assessment today so not able to tolerate some of the MMT testing. Pt to contact our dept if the injuries will limit her from progressing with PT.     :  1975 # of Visits since SOC:   6   MRN: [id number]  CSN: [id number] Start of Care Date:   2024   Insurance: Payor: [facility] / [facility] / Product Type: *No Product type* /   Insurance ID: [id number] - (Medicaid Managed) Secondary Insurance (if applicable):    Referring Physician: Kedar Doty DO Dr. Balk   PCP: Td Hatfield APRN - CNP Visits to Date/Visits Approved:     No Show/Cancelled Appts:      Medical Diagnosis: Tendonitis, Achilles, right [M76.61] Achilles Tendonitis  Treatment Diagnosis: R achilles tendonitis, decreased and painful ARoM, weakness        SUBJECTIVE EXAMINATION   Pain Level: Pain Screening  Pain Assessment: 0-10  Pain Level: 7  Pain Location: Ankle  Pain Descriptors: Burning, Tightness    Patient Comments: Subjective: Pt reports 45% improvement since initating therapy. Pt working on trying to walk further distances wtih cane in the house however had a flareup of \"burning pain\" up to 9/10 last wk thus pt has not used cane in [facility] house since then, using crutches however still trying to work on \"heel toe\" gait pattern. Pt scheduled for carpal tunnel surgery 3/31/24.  Pt ave pain last 7-9/10 when sitting or trying to walk the last few days.    HEP Compliance: Good        OBJECTIVE EXAMINATION   Restrictions:  Restrictions/Precautions: -- (Fell in October- in bedroom hit R knee,L

## 2024-03-13 ENCOUNTER — TELEPHONE (OUTPATIENT)
Dept: FAMILY MEDICINE CLINIC | Age: 49
End: 2024-03-13

## 2024-03-14 ENCOUNTER — HOSPITAL ENCOUNTER (OUTPATIENT)
Dept: PHYSICAL THERAPY | Age: 49
Setting detail: THERAPIES SERIES
Discharge: HOME | End: 2024-03-14
Payer: COMMERCIAL

## 2024-03-14 PROCEDURE — 97035 APP MDLTY 1+ULTRASOUND EA 15: CPT

## 2024-03-14 PROCEDURE — 97140 MANUAL THERAPY 1/> REGIONS: CPT

## 2024-03-14 PROCEDURE — 97110 THERAPEUTIC EXERCISES: CPT

## 2024-03-14 ASSESSMENT — PAIN SCALES - GENERAL: PAINLEVEL_OUTOF10: 6

## 2024-03-14 NOTE — PROGRESS NOTES
Physical Therapy: Daily Note   Patient: Dahlia DUVALL Workman (48 y.o. female)   Examination Date: 2024  Plan of Care/Certification Expiration Date: 24    Progress Note Counter: Recheck completed on 10/19/23 and plan to see pt 1x per week for 4 more weeks and then DC to HEP; Pt fell at home last night injuring her left hand and is sore in her right knee, hip and back and is planning to go to ER after her recheck assessment today so not able to tolerate some of the MMT testing. Pt to contact our dept if the injuries will limit her from progressing with PT.     :  1975 # of Visits since SOC:   7   MRN: [id number]  CSN: [id number] Start of Care Date:   2024   Insurance: [facility]or: [facility] / [facility] / Product Type: *No Product type* /   Insurance ID: [id number] - (Medicaid Managed) Secondary Insurance (if applicable):    Referring Physician: Kedar Doty DO Dr. Balk   PCP: Td Hatfield APRN - CNP Visits to Date/Visits Approved:     No Show/Cancelled Appts:      Medical Diagnosis: Tendonitis, Achilles, right [M76.61]    Treatment Diagnosis: R achilles tendonitis, decreased and painful ARoM, weakness        SUBJECTIVE EXAMINATION   Pain Level: Pain Screening  Patient Currently in Pain: Yes  Pain Assessment: 0-10  Pain Level: 6    Patient Comments: Subjective: Pt reports R achiles tendon pain 6/10. Pt states she is unable to stand for longer durations.    HEP Compliance: Fair            TREATMENT     Exercises:      Treatment Reasoning    Exercise 4: gastroc stretch long sitting H 30 sec x 3 and soleus stretch long sitting H30'' x 3  Exercise 15: R ankle DF/PF x 15 H3'' RTB  Exercise 16: R ankle eversion/inversion  x 15 H3'' RTB                          Manual Therapy: (CPT 51272) Treatment Reasoning     Soft Tissue Mobilizaton: IASTM to L gastroc, achilles and PF to dec tightness mod restriction noted good tolerance  Other: KT tape 2 strips proximal calf with distal pull

## 2024-03-18 ENCOUNTER — HOSPITAL ENCOUNTER (OUTPATIENT)
Dept: PHYSICAL THERAPY | Age: 49
Setting detail: THERAPIES SERIES
Discharge: HOME | End: 2024-03-18
Payer: COMMERCIAL

## 2024-03-18 PROCEDURE — 97035 APP MDLTY 1+ULTRASOUND EA 15: CPT

## 2024-03-18 PROCEDURE — 97110 THERAPEUTIC EXERCISES: CPT

## 2024-03-18 PROCEDURE — 97140 MANUAL THERAPY 1/> REGIONS: CPT

## 2024-03-18 ASSESSMENT — PAIN DESCRIPTION - ORIENTATION: ORIENTATION: RIGHT

## 2024-03-18 ASSESSMENT — PAIN DESCRIPTION - LOCATION: LOCATION: ANKLE

## 2024-03-18 ASSESSMENT — PAIN DESCRIPTION - PAIN TYPE: TYPE: CHRONIC PAIN

## 2024-03-18 ASSESSMENT — PAIN SCALES - GENERAL: PAINLEVEL_OUTOF10: 9

## 2024-03-18 NOTE — PLAN OF CARE
Daily Treatment Note  Date: 3/18/2024  Patient Name: Dahlia Quinn  MRN: [id number]     :   1975    Referring Physician: Kedar Doty DO Dr. Balk   PCP: Td Hatfield, APRN - CNP    Medical Diagnosis: Tendonitis, Achilles, right [M76.61]    Treatment Diagnosis: R achilles tendonitis, decreased and painful ARoM, weakness      Insurance: Payor: [facility] / Plan: [facility] MEDICAID / Product Type: *No Product type* /   Insurance ID: [id number] - (Medicaid Managed)    Subjective:   General  Referring Provider (secondary): Dr. Doty  PT Visit Information  Onset Date: 23  Total # of Visits Approved: 12  Total # of Visits to Date: 8  Plan of Care/Certification Expiration Date: 24  No Show: 1  Canceled Appointment: 1  Referring Provider (secondary): Dr. Doty  Subjective  Subjective: Pt. reports pain is 9/10 in achilles tendon. Pt. reports the cold weather doesn't help.  Pt. states has been focusing on heel strike and toe off.  Pain Screening  Patient Currently in Pain: Yes  Pain Assessment: 0-10  Pain Level: 9  Pain Type: Chronic pain  Pain Location: Ankle  Pain Orientation: Right       Treatment Activities:  Exercises:      Treatment Reasoning    Exercise 1: seated with feet on stool DF/PF and INV/EV x 10 hold 3 sec  Exercise 3: ankle circles cw and ccw x 10  Exercise 4: gastroc stretch long sitting H 30 sec x 3 and soleus stretch long sitting H30'' x 3 encouraged longer holds as able 45-60 sec  Exercise 15: R ankle DF/PF x 15 H3'' GTB  Exercise 16: R ankle eversion/inversion  x 15 H3'' GTB VC for form    Limitations addressed: Strength, Mobility                     Gait: (CPT 33308)  Treatment Reasoning    Gait Training 1: Pt. ambualted with B crutches for 100' x 2 with 4 point gait pattern and flexed at spine and hips. Pt. demos heel toe pattern with no inc in pain. Difficulty with taller posture due to back pain.                     Manual Therapy: (CPT 58825) Treatment Reasoning     Joint

## 2024-03-21 ENCOUNTER — HOSPITAL ENCOUNTER (OUTPATIENT)
Dept: PHYSICAL THERAPY | Age: 49
Setting detail: THERAPIES SERIES
Discharge: HOME | End: 2024-03-21
Payer: COMMERCIAL

## 2024-03-21 NOTE — PROGRESS NOTES
Daily Treatment Note  Date: 3/21/2024  Patient Name: Dahlia Quinn  MRN: [id number]     :   1975    Referring Physician: Kedar Doty DO Dr. Balk   PCP: Td Hatfield APRN - CNP    Medical Diagnosis: Tendonitis, Achilles, right [M76.61]    Treatment Diagnosis: R achilles tendonitis, decreased and painful ARoM, weakness      Insurance: Payor: [facility] / Plan: [facility] MEDICAID / Product Type: *No Product type* /   Insurance ID: [id number] - (Medicaid Managed)    Subjective:   General  Referring Provider (secondary): Dr. Doty  PT Visit Information  Onset Date: 23  Total # of Visits Approved: 12  Total # of Visits to Date: 8  Plan of Care/Certification Expiration Date: 24  No Show: 1  Canceled Appointment: 2  Referring Provider (secondary): Dr. Doty  Subjective  Subjective: Pt. cancelled ill.          Assessment:   Conditions Requiring Skilled Therapeutic Intervention  Body Structures, Functions, Activity Limitations Requiring Skilled Therapeutic Intervention: Decreased functional mobility ;Decreased strength;Decreased endurance;Decreased posture;Increased pain;Decreased ROM  Treatment Diagnosis: R achilles tendonitis, decreased and painful ARoM, weakness  Therapy Prognosis: Fair;Good  Decision Making: Medium Complexity                              Goals:  Short Term Goals  Time Frame for Short Term Goals: 2 wks  Short Term Goal 1: Pt report any decrease in R ankle/lower leg pain during standing /walking  STG 1 Current Status:: 3/11/24: pt had reported some decrease in pain prior to last wk- flare up of burning pain after trying to use cane longer distances in the house  STG Goal 1 Status:: In progress, Partially met  Short Term Goal 2: Pt report compliance with HEP 5/7 days per wk or more frequently  STG 2 Current Status:: 3/11/24 pt reports doing HEP daily  STG Goal 2 Status:: Met  Short Term Goal 3: Improve AROM ankle df by 10 deg or greater (currently lacking 20 deg from neutral

## 2024-03-25 ENCOUNTER — TELEPHONE (OUTPATIENT)
Dept: ORTHOPEDIC SURGERY | Age: 49
End: 2024-03-25

## 2024-03-25 NOTE — TELEPHONE ENCOUNTER
LVM checking in on patient as she no-showed her pre-op today. Surgery is on 3/29. I let patient know surgery will be cancelled if we do not hear from her by 3/27.

## 2024-03-28 ENCOUNTER — HOSPITAL ENCOUNTER (OUTPATIENT)
Dept: PHYSICAL THERAPY | Age: 49
Setting detail: THERAPIES SERIES
Discharge: HOME | End: 2024-03-28
Payer: COMMERCIAL

## 2024-03-28 PROCEDURE — 97110 THERAPEUTIC EXERCISES: CPT

## 2024-03-28 PROCEDURE — 97035 APP MDLTY 1+ULTRASOUND EA 15: CPT

## 2024-03-28 PROCEDURE — 97140 MANUAL THERAPY 1/> REGIONS: CPT

## 2024-03-28 NOTE — PROGRESS NOTES
and pain 5/10 or less  LTG 4 Current Status:: 3/11/24: Pt amb 120 feet with rollator, 20 feet wtih L forearm cruch and cane R, partially flexed at spine decreaed heel strike R during amb, pain up to 9/10  LTG Goal 4 Status:: Not Met  Long Term Goal 5: Improve FAAM from 79% at time of IE to 40% or less at time of D/C from PT  LTG 5 Current Status:: Recheck 3/11/24: FAAM 59% improved from 79%  LTG Goal 5 Status:: In progress  Additional Goals?: Yes  Long term goal 6: Pt I with advanced HEP to further improve ROM and strength  LTG 6 Current Status:: Recheck 3/11/24: Have progressed ROM/stretching and gait training, plan to begin light resistance strengthening exs next session  Patient Goals   Patient Goals : \"to be able to get my heel back on the ground when walking\"    Plan:    Physical Therapy Plan  Plan weeks: 6-8 wks  Specific Instructions for Next Treatment: Next session begin light resistance (yellow) ankle TBAnd exs  Current Treatment Recommendations: Strengthening, ROM, Balance training, Gait training, Functional mobility training, Transfer training, Pain management, Home exercise program, Positioning, Safety education & training, Therapeutic activities, Modalities, Patient/Caregiver education & training, Endurance training  Additional Comments: KT tape        Therapy Time:   Individual Concurrent Group Co-treatment   Time In  1015         Time Out  1100         Minutes  45                 Angi Montemayor PTA

## 2024-04-01 NOTE — ED NOTES
Patient to ER after a fall last night. Patient states she was not dizzy and denies LOC or injury to head. Axox4. Patient is playing candy crush on IPAD during triage. Patient does not appear to be in any acute distress.  Electronically signed by Parish Robles RN on 10/19/2023 at 10:39 AM       Parish Robles RN  10/19/23 [phone]
Wrist splint is applied to pt's LUE-MSP's intact. Pt is then given d/c instructions and paperwork for ortho supply. Pt voiced understanding of d/c instructions without further questions.       Micah Whiteside RN  10/19/23 [id number]
anticipate HCPT +supervision at d/c/Home PT

## 2024-04-02 ENCOUNTER — HOSPITAL ENCOUNTER (OUTPATIENT)
Dept: PHYSICAL THERAPY | Age: 49
Setting detail: THERAPIES SERIES
Discharge: HOME | End: 2024-04-02

## 2024-04-02 NOTE — PROGRESS NOTES
Physical Therapy  Daily Treatment Note  Date: 2024  Patient Name: Dahlia Quinn  MRN: [id number]     :   1975    Referring Physician: Kedar Doty DO Dr. Balk   PCP: Td Hatfield APRN - CNP    Medical Diagnosis: Tendonitis, Achilles, right [M76.61]    Treatment Diagnosis: R achilles tendonitis, decreased and painful ARoM, weakness      Insurance: Payor: [facility] / Plan: [facility] MEDICAID / Product Type: *No Product type* /   Insurance ID: [id number] - (Medicaid Managed)    Subjective:   General  Referring Provider (secondary): Dr. Doty  PT Visit Information  Onset Date: 23  Total # of Visits Approved: 12  Total # of Visits to Date: 9  Plan of Care/Certification Expiration Date: 24  No Show: 2  Canceled Appointment: 3  Referring Provider (secondary): Dr. Doty  Subjective  Subjective: Pt. cancelled ill.         Assessment:   Conditions Requiring Skilled Therapeutic Intervention  Body Structures, Functions, Activity Limitations Requiring Skilled Therapeutic Intervention: Decreased functional mobility ;Decreased strength;Decreased endurance;Decreased posture;Increased pain;Decreased ROM  Treatment Diagnosis: R achilles tendonitis, decreased and painful ARoM, weakness  Therapy Prognosis: Fair;Good          Goals:  Short Term Goals  Time Frame for Short Term Goals: 2 wks  Short Term Goal 1: Pt report any decrease in R ankle/lower leg pain during standing /walking  STG 1 Current Status:: 3/11/24: pt had reported some decrease in pain prior to last wk- flare up of burning pain after trying to use cane longer distances in the house  STG Goal 1 Status:: In progress, Partially met  Short Term Goal 2: Pt report compliance with HEP 5/7 days per wk or more frequently  STG 2 Current Status:: 3/11/24 pt reports doing HEP daily  STG Goal 2 Status:: Met  Short Term Goal 3: Improve AROM ankle df by 10 deg or greater (currently lacking 20 deg from neutral position)  STG 3 Current Status:: 3/11/25

## 2024-04-09 ENCOUNTER — HOSPITAL ENCOUNTER (OUTPATIENT)
Dept: PHYSICAL THERAPY | Age: 49
Setting detail: THERAPIES SERIES
Discharge: HOME | End: 2024-04-09
Payer: COMMERCIAL

## 2024-04-09 PROCEDURE — 97035 APP MDLTY 1+ULTRASOUND EA 15: CPT

## 2024-04-09 PROCEDURE — 97110 THERAPEUTIC EXERCISES: CPT

## 2024-04-09 PROCEDURE — 97140 MANUAL THERAPY 1/> REGIONS: CPT

## 2024-04-09 ASSESSMENT — PAIN DESCRIPTION - PAIN TYPE: TYPE: CHRONIC PAIN

## 2024-04-09 ASSESSMENT — PAIN DESCRIPTION - LOCATION: LOCATION: ANKLE

## 2024-04-09 ASSESSMENT — PAIN DESCRIPTION - DESCRIPTORS: DESCRIPTORS: BURNING

## 2024-04-09 ASSESSMENT — PAIN SCALES - GENERAL: PAINLEVEL_OUTOF10: 9

## 2024-04-09 NOTE — PROGRESS NOTES
Physical Therapy: Daily Note   Patient: Dahlia DUVALL Workman (48 y.o. female)   Examination Date: 2024  Plan of Care/Certification Expiration Date: 24    Progress Note Counter: Pt getting twitching in the muscle in the right LE and spasming, the feeling right before it cramps up. S/S get worse when flexing her toes upward.     :  1975 # of Visits since SOC:   19   MRN: [id number]  CSN: [id number] Start of Care Date:   2023   Insurance: Payor: [facility] / Plan: [facility] MEDICAID / Product Type: *No Product type* /   Insurance ID: [id number] - (Medicaid Managed) Secondary Insurance (if applicable):    Referring Physician: Kedar Doty DO Dr. Balk   PCP: Td Hatfield APRN - CNP Visits to Date/Visits Approved: 10 / 12    No Show/Cancelled Appts: 2 / 3     Medical Diagnosis: Tendonitis, Achilles, right [M76.61]    Treatment Diagnosis: R achilles tendonitis, decreased and painful ARoM, weakness        SUBJECTIVE EXAMINATION   Pain Level: Pain Screening  Patient Currently in Pain: Yes  Pain Assessment: 0-10  Pain Level: 9  Best Pain Level: 8  Worst Pain Level: 10  Pain Type: Chronic pain  Pain Location: Ankle  Pain Descriptors: Burning    Patient Comments: Subjective: Pt getting twitching in the muscle in the right LE and spasming, the feeling right before it cramps up. S/S get worse when flexing her toes upward. She feels like her back is really nervy and that in the past injections did not work. She is compliant with all HEP and frustrated with nerve S/S that very.    HEP Compliance: Good        OBJECTIVE EXAMINATION   Restrictions:  Restrictions/Precautions: -- (Fell in October- in bedroom hit R knee,L wrist, R hip, walking from bed to doorway without AD)   Required Braces or Orthoses?: -- (Recommended B wrist splints)   No data recorded              TREATMENT     Exercises:      Treatment Reasoning    Exercise 1: seated with feet on stool DF/PF and INV/EV x 10 hold 3 sec  Exercise 2: seated

## 2024-04-11 ENCOUNTER — HOSPITAL ENCOUNTER (OUTPATIENT)
Dept: PHYSICAL THERAPY | Age: 49
Setting detail: THERAPIES SERIES
Discharge: HOME | End: 2024-04-11
Payer: COMMERCIAL

## 2024-04-11 PROCEDURE — 97110 THERAPEUTIC EXERCISES: CPT

## 2024-04-11 PROCEDURE — 97140 MANUAL THERAPY 1/> REGIONS: CPT

## 2024-04-11 PROCEDURE — 97530 THERAPEUTIC ACTIVITIES: CPT

## 2024-04-11 PROCEDURE — 97116 GAIT TRAINING THERAPY: CPT

## 2024-04-11 ASSESSMENT — PAIN DESCRIPTION - DESCRIPTORS: DESCRIPTORS: BURNING;TIGHTNESS

## 2024-04-11 ASSESSMENT — PAIN SCALES - GENERAL: PAINLEVEL_OUTOF10: 8

## 2024-04-11 NOTE — PROGRESS NOTES
met heads.  I strip max tension across achilles and X across PF max tension to dec tightness and pain Limitations addressed: Joint motion, Tissue extensibility                    Pt Education: PT Education: Home Exercise Program, Goals, Evaluative findings  Patient Education: Reviewed HEP and importance of compliance with HEP. Also encouraged slow progresion of gait with cane and using crutches in home and rollator for longer distances as long as high intense pain level persists. Encouraged CP PRN for pain reduction and cont with progression of HEP as instructed. Encouraged frequent short walks as tolerated with appropriate AD.       ASSESSMENT     Assessment: Assessment: 48 year old female who has been receiving PT treatment for R achilles tendonitis presents to PT for monthly recheck. Pt cont to report 7-10/10 ankle pain pending acitivity level and day. Amb distance remains limited however improved with both cane and rollator. Pt now able to demonstrate heel strike consistently as pt had not been able to tolerate heel strike (walking on met heads/toes) prior to starting therapy ROM and strength have improved however pt has plateaued in progress over the last few weeks. Pt has achieved 3/4 STGs and partially achieved LTGs. Recommend D/C with HEP and pt cont with HEP daily.  Body Structures, Functions, Activity Limitations Requiring Skilled Therapeutic Intervention: Decreased functional mobility , Decreased strength, Decreased endurance, Decreased posture, Increased pain, Decreased ROM      Activity Tolerance: Patient limited by pain; Patient tolerated treatment well    Therapy Prognosis: Fair; Good       GOALS   Patient Goals : \"to be able to get my heel back on the ground when walking\"  Short Term Goals Completed by 2 wks Current Status Goal Status   Pt report any decrease in R ankle/lower leg pain during standing /walking Pain fluctuates between 8-10/10 pending day and activity, not much change in pain overall

## 2024-05-08 ENCOUNTER — PATIENT MESSAGE (OUTPATIENT)
Dept: NEUROLOGY | Age: 49
End: 2024-05-08

## 2024-05-14 ENCOUNTER — HOSPITAL ENCOUNTER (EMERGENCY)
Age: 49
Discharge: HOME | End: 2024-05-14
Attending: EMERGENCY MEDICINE
Payer: COMMERCIAL

## 2024-05-14 VITALS
DIASTOLIC BLOOD PRESSURE: 87 MMHG | OXYGEN SATURATION: 98 % | SYSTOLIC BLOOD PRESSURE: 144 MMHG | HEART RATE: 96 BPM | BODY MASS INDEX: 48.82 KG/M2 | HEIGHT: 65 IN | WEIGHT: 293 LBS | TEMPERATURE: 98.5 F | RESPIRATION RATE: 18 BRPM

## 2024-05-14 DIAGNOSIS — S01.532A PIERCED TONGUE INFECTION: Primary | ICD-10-CM

## 2024-05-14 DIAGNOSIS — K14.0 PIERCED TONGUE INFECTION: Primary | ICD-10-CM

## 2024-05-14 PROCEDURE — 99283 EMERGENCY DEPT VISIT LOW MDM: CPT

## 2024-05-14 PROCEDURE — 6370000000 HC RX 637 (ALT 250 FOR IP): Performed by: EMERGENCY MEDICINE

## 2024-05-14 RX ORDER — HYDROCODONE BITARTRATE AND ACETAMINOPHEN 5; 325 MG/1; MG/1
1 TABLET ORAL EVERY 6 HOURS PRN
Qty: 8 TABLET | Refills: 0 | Status: SHIPPED | OUTPATIENT
Start: 2024-05-14 | End: 2024-05-17

## 2024-05-14 RX ORDER — CLINDAMYCIN HYDROCHLORIDE 150 MG/1
300 CAPSULE ORAL ONCE
Status: COMPLETED | OUTPATIENT
Start: 2024-05-14 | End: 2024-05-14

## 2024-05-14 RX ORDER — CLINDAMYCIN HYDROCHLORIDE 300 MG/1
300 CAPSULE ORAL 4 TIMES DAILY
Qty: 40 CAPSULE | Refills: 0 | Status: SHIPPED | OUTPATIENT
Start: 2024-05-14 | End: 2024-05-24

## 2024-05-14 RX ADMIN — CLINDAMYCIN HYDROCHLORIDE 300 MG: 150 CAPSULE ORAL at 13:04

## 2024-05-14 ASSESSMENT — PAIN DESCRIPTION - DESCRIPTORS: DESCRIPTORS: SQUEEZING;THROBBING

## 2024-05-14 ASSESSMENT — PAIN - FUNCTIONAL ASSESSMENT: PAIN_FUNCTIONAL_ASSESSMENT: 0-10

## 2024-05-14 ASSESSMENT — PAIN DESCRIPTION - LOCATION: LOCATION: OTHER (COMMENT)

## 2024-05-14 ASSESSMENT — PAIN SCALES - GENERAL: PAINLEVEL_OUTOF10: 9

## 2024-05-14 ASSESSMENT — PAIN DESCRIPTION - PAIN TYPE: TYPE: ACUTE PAIN

## 2024-05-14 NOTE — ED TRIAGE NOTES
Patient here with complains of tongue swelling since yesterday.  She has a tongue piercing that she got \"forever ago.\"  She states she now has a hard lump around her tongue ring that started today.  She reports some pain in her left throat with swallowing.

## 2024-05-14 NOTE — ED PROVIDER NOTES
Socioeconomic History   • Marital status: Single     Spouse name: None   • Number of children: None   • Years of education: None   • Highest education level: None   Occupational History   • Occupation: on disability due to knee and back pain   Tobacco Use   • Smoking status: Former     Current packs/day: 0.00     Average packs/day: 2.0 packs/day for 27.0 years (54.0 ttl pk-yrs)     Types: Cigarettes     Start date: 1982     Quit date: 2009     Years since quitting: 15.3     Passive exposure: Never   • Smokeless tobacco: Never   Vaping Use   • Vaping Use: Never used   Substance and Sexual Activity   • Alcohol use: Yes   • Drug use: Not Currently     Types: Marijuana (Weed)     Comment: hx marijuana, cocaine, PCP, methanphetamine use in the past, last time was 11 years ago   • Sexual activity: Not Currently     Partners: Male     Comment: no history of STI   Social History Narrative    Lives with one daughter         2 dogs        Hobbies: reading, music     Social Determinants of Health     Financial Resource Strain: Low Risk  (4/19/2023)    Overall Financial Resource Strain (CARDIA)    • Difficulty of Paying Living Expenses: Not hard at all   Transportation Needs: Unknown (4/19/2023)    PRAPARE - Transportation    • Lack of Transportation (Non-Medical): No   Housing Stability: Unknown (4/19/2023)    Housing Stability Vital Sign    • Unstable Housing in the Last Year: No       PHYSICAL EXAM       ED Triage Vitals [05/14/24 1219]   BP Temp Temp Source Pulse Respirations SpO2 Height Weight - Scale   (!) 186/101 98.5 °F (36.9 °C) Oral 96 18 98 % 1.651 m (5' 5\") (!) 163.3 kg (360 lb)       Physical Exam  General appearance: Patient is awake alert interactive appropriate nontoxic in no acute distress  Head is atraumatic normocephalic  Eyes pupils are equal and reactive sclera white conjunctive are pink  Oral pharyngeal cavity is pink with good moisture, no exudates or ulcerations no asymmetry, the airway is widely

## 2024-05-14 NOTE — ED NOTES
Discharge instructions reviewed with patient.  Prescriptions given to patient.  Medications and follow up discussed.  No questions at this time.  Patient ambulatory in stable condition with rollator at bedside.

## 2024-05-18 ASSESSMENT — ENCOUNTER SYMPTOMS
VOMITING: 0
SHORTNESS OF BREATH: 0
EYE DISCHARGE: 0
SORE THROAT: 0
COUGH: 0
SINUS PRESSURE: 0
COLOR CHANGE: 0
EYE REDNESS: 0
TROUBLE SWALLOWING: 0
NAUSEA: 0

## 2024-05-21 ENCOUNTER — OFFICE VISIT (OUTPATIENT)
Dept: FAMILY MEDICINE CLINIC | Age: 49
End: 2024-05-21
Payer: COMMERCIAL

## 2024-05-21 VITALS
BODY MASS INDEX: 47.09 KG/M2 | OXYGEN SATURATION: 98 % | HEART RATE: 93 BPM | HEIGHT: 66 IN | WEIGHT: 293 LBS | DIASTOLIC BLOOD PRESSURE: 62 MMHG | SYSTOLIC BLOOD PRESSURE: 128 MMHG

## 2024-05-21 DIAGNOSIS — J45.40 MODERATE PERSISTENT ASTHMA WITHOUT COMPLICATION: ICD-10-CM

## 2024-05-21 DIAGNOSIS — M62.830 BACK MUSCLE SPASM: ICD-10-CM

## 2024-05-21 DIAGNOSIS — S01.532A INFECTED PIERCED TONGUE: Primary | ICD-10-CM

## 2024-05-21 DIAGNOSIS — K14.0 INFECTED PIERCED TONGUE: Primary | ICD-10-CM

## 2024-05-21 PROCEDURE — G8417 CALC BMI ABV UP PARAM F/U: HCPCS

## 2024-05-21 PROCEDURE — 99213 OFFICE O/P EST LOW 20 MIN: CPT

## 2024-05-21 PROCEDURE — 1036F TOBACCO NON-USER: CPT

## 2024-05-21 PROCEDURE — G8427 DOCREV CUR MEDS BY ELIG CLIN: HCPCS

## 2024-05-21 RX ORDER — CYCLOBENZAPRINE HCL 10 MG
10 TABLET ORAL 3 TIMES DAILY PRN
Qty: 90 TABLET | Refills: 1 | Status: SHIPPED | OUTPATIENT
Start: 2024-05-21

## 2024-05-21 RX ORDER — ESZOPICLONE 2 MG/1
2 TABLET, FILM COATED ORAL NIGHTLY
COMMUNITY
Start: 2024-04-17

## 2024-05-21 RX ORDER — ALBUTEROL SULFATE 2.5 MG/3ML
2.5 SOLUTION RESPIRATORY (INHALATION) 4 TIMES DAILY PRN
Qty: 120 EACH | Refills: 1 | Status: SHIPPED | OUTPATIENT
Start: 2024-05-21

## 2024-05-21 RX ORDER — CYCLOBENZAPRINE HCL 10 MG
TABLET ORAL
COMMUNITY
Start: 2024-04-22 | End: 2024-05-21 | Stop reason: SDUPTHER

## 2024-05-21 SDOH — ECONOMIC STABILITY: FOOD INSECURITY: WITHIN THE PAST 12 MONTHS, YOU WORRIED THAT YOUR FOOD WOULD RUN OUT BEFORE YOU GOT MONEY TO BUY MORE.: NEVER TRUE

## 2024-05-21 SDOH — ECONOMIC STABILITY: FOOD INSECURITY: WITHIN THE PAST 12 MONTHS, THE FOOD YOU BOUGHT JUST DIDN'T LAST AND YOU DIDN'T HAVE MONEY TO GET MORE.: NEVER TRUE

## 2024-05-21 SDOH — ECONOMIC STABILITY: INCOME INSECURITY: HOW HARD IS IT FOR YOU TO PAY FOR THE VERY BASICS LIKE FOOD, HOUSING, MEDICAL CARE, AND HEATING?: NOT HARD AT ALL

## 2024-05-21 ASSESSMENT — ENCOUNTER SYMPTOMS
COLOR CHANGE: 0
COUGH: 1
EYES NEGATIVE: 1
SHORTNESS OF BREATH: 0
WHEEZING: 1
ALLERGIC/IMMUNOLOGIC NEGATIVE: 1
GASTROINTESTINAL NEGATIVE: 1

## 2024-05-21 ASSESSMENT — PATIENT HEALTH QUESTIONNAIRE - PHQ9
8. MOVING OR SPEAKING SO SLOWLY THAT OTHER PEOPLE COULD HAVE NOTICED. OR THE OPPOSITE, BEING SO FIGETY OR RESTLESS THAT YOU HAVE BEEN MOVING AROUND A LOT MORE THAN USUAL: NOT AT ALL
SUM OF ALL RESPONSES TO PHQ QUESTIONS 1-9: 1
SUM OF ALL RESPONSES TO PHQ QUESTIONS 1-9: 1
4. FEELING TIRED OR HAVING LITTLE ENERGY: NOT AT ALL
2. FEELING DOWN, DEPRESSED OR HOPELESS: NOT AT ALL
9. THOUGHTS THAT YOU WOULD BE BETTER OFF DEAD, OR OF HURTING YOURSELF: NOT AT ALL
3. TROUBLE FALLING OR STAYING ASLEEP: NOT AT ALL
1. LITTLE INTEREST OR PLEASURE IN DOING THINGS: SEVERAL DAYS
SUM OF ALL RESPONSES TO PHQ9 QUESTIONS 1 & 2: 1
SUM OF ALL RESPONSES TO PHQ QUESTIONS 1-9: 1
5. POOR APPETITE OR OVEREATING: NOT AT ALL
SUM OF ALL RESPONSES TO PHQ QUESTIONS 1-9: 1
10. IF YOU CHECKED OFF ANY PROBLEMS, HOW DIFFICULT HAVE THESE PROBLEMS MADE IT FOR YOU TO DO YOUR WORK, TAKE CARE OF THINGS AT HOME, OR GET ALONG WITH OTHER PEOPLE: SOMEWHAT DIFFICULT
6. FEELING BAD ABOUT YOURSELF - OR THAT YOU ARE A FAILURE OR HAVE LET YOURSELF OR YOUR FAMILY DOWN: NOT AT ALL
7. TROUBLE CONCENTRATING ON THINGS, SUCH AS READING THE NEWSPAPER OR WATCHING TELEVISION: NOT AT ALL

## 2024-05-21 NOTE — PROGRESS NOTES
daily    SILVER-CARBOXYMETHYLCELLULOSE (AQUACEL AG ADVANTAGE) 4\"X5\" PADS    Apply 2 each topically daily    SODIUM CHLORIDE (OCEAN) 0.65 % NASAL SPRAY    1 spray by Nasal route as needed for Congestion    VRAYLAR 1.5 MG CAPSULE    Take 1 capsule by mouth daily    VRAYLAR 4.5 MG CAPS CAPSULE    Take 1 capsule by mouth every morning    ZOLPIDEM (AMBIEN) 10 MG TABLET    Take 1 tablet by mouth nightly as needed for Sleep for up to 90 days. Max Daily Amount: 10 mg     ALLERGIES     Patient is is allergic to beeswax, gabapentin, hydroxyzine hcl, iodinated contrast media, iv contrast [iodides], lamictal [lamotrigine], oxcarbazepine, pcn [penicillins], shellfish-derived products, vancomycin, cephalexin, methylphenidate, neosporin [neomycin-polymyxin-gramicidin], tramadol, and voltaren [diclofenac sodium].  FAMILY HISTORY     Patient'sfamily history includes Breast Cancer (age of onset: 44) in her sister; Breast Cancer (age of onset: 64) in her maternal grandmother; COPD in her father; Cancer in her maternal uncle; Diabetes in her mother; Heart Failure in her brother and father; High Blood Pressure in her father and mother; High Cholesterol in her father and mother; Kidney Disease in her mother; No Known Problems in her maternal grandfather, paternal grandfather, and paternal grandmother.  HISTORY     Patient  reports that she quit smoking about 15 years ago. Her smoking use included cigarettes. She started smoking about 42 years ago. She has a 54.0 pack-year smoking history. She has never been exposed to tobacco smoke. She has never used smokeless tobacco. She reports current alcohol use. She reports that she does not currently use drugs after having used the following drugs: Marijuana ([place]).  READY CARE COURSE   No orders of the defined types were placed in this encounter.       Labs:  No results found for this visit on 05/21/24.  IMAGING:  No orders to display     Scheduled Meds:  Continuous Infusions:  PRN

## 2024-05-30 DIAGNOSIS — E03.9 HYPOTHYROIDISM, UNSPECIFIED TYPE: ICD-10-CM

## 2024-05-30 RX ORDER — LEVOTHYROXINE SODIUM 88 UG/1
88 TABLET ORAL DAILY
Qty: 90 TABLET | Refills: 1 | Status: SHIPPED | OUTPATIENT
Start: 2024-05-30

## 2024-05-30 NOTE — TELEPHONE ENCOUNTER
Comments:     Last Office Visit (last PCP visit):   5/21/2024    Next Visit Date:  No future appointments.    **If hasn't been seen in over a year OR hasn't followed up according to last diabetes/ADHD visit, make appointment for patient before sending refill to provider.    Rx requested:  Requested Prescriptions     Pending Prescriptions Disp Refills    levothyroxine (SYNTHROID) 88 MCG tablet [Pharmacy Med Name: LEVOTHYROXINE 88 MCG TABLET] 90 tablet 1     Sig: TAKE 1 TABLET BY MOUTH EVERY DAY

## 2024-06-14 ENCOUNTER — OFFICE VISIT (OUTPATIENT)
Dept: FAMILY MEDICINE CLINIC | Age: 49
End: 2024-06-14
Payer: COMMERCIAL

## 2024-06-14 ENCOUNTER — HOSPITAL ENCOUNTER (OUTPATIENT)
Dept: LAB | Age: 49
Discharge: HOME | End: 2024-06-14
Payer: COMMERCIAL

## 2024-06-14 VITALS
SYSTOLIC BLOOD PRESSURE: 118 MMHG | OXYGEN SATURATION: 98 % | HEIGHT: 66 IN | BODY MASS INDEX: 47.09 KG/M2 | DIASTOLIC BLOOD PRESSURE: 88 MMHG | HEART RATE: 97 BPM | WEIGHT: 293 LBS

## 2024-06-14 DIAGNOSIS — Z12.31 ENCOUNTER FOR SCREENING MAMMOGRAM FOR MALIGNANT NEOPLASM OF BREAST: ICD-10-CM

## 2024-06-14 DIAGNOSIS — Z13.1 SCREENING FOR DIABETES MELLITUS (DM): ICD-10-CM

## 2024-06-14 DIAGNOSIS — L97.921 ULCER OF LEFT LOWER EXTREMITY, LIMITED TO BREAKDOWN OF SKIN (HCC): Primary | ICD-10-CM

## 2024-06-14 DIAGNOSIS — Z53.20 PAP SMEAR OF CERVIX DECLINED: ICD-10-CM

## 2024-06-14 DIAGNOSIS — Z80.3 FAMILY HISTORY OF BREAST CANCER: ICD-10-CM

## 2024-06-14 DIAGNOSIS — E03.8 OTHER SPECIFIED HYPOTHYROIDISM: Chronic | ICD-10-CM

## 2024-06-14 DIAGNOSIS — Z13.220 SCREENING FOR LIPID DISORDERS: ICD-10-CM

## 2024-06-14 DIAGNOSIS — F25.1 SCHIZOAFFECTIVE DISORDER, DEPRESSIVE TYPE (HCC): ICD-10-CM

## 2024-06-14 DIAGNOSIS — Z13.0 SCREENING FOR DEFICIENCY ANEMIA: ICD-10-CM

## 2024-06-14 DIAGNOSIS — E03.8 OTHER SPECIFIED HYPOTHYROIDISM: ICD-10-CM

## 2024-06-14 DIAGNOSIS — Z12.11 SCREENING FOR COLON CANCER: ICD-10-CM

## 2024-06-14 DIAGNOSIS — G43.009 MIGRAINE WITHOUT AURA AND WITHOUT STATUS MIGRAINOSUS, NOT INTRACTABLE: Chronic | ICD-10-CM

## 2024-06-14 DIAGNOSIS — J45.40 MODERATE PERSISTENT ASTHMA WITHOUT COMPLICATION: ICD-10-CM

## 2024-06-14 DIAGNOSIS — F31.81 BIPOLAR II DISORDER (HCC): ICD-10-CM

## 2024-06-14 PROBLEM — G56.02 CARPAL TUNNEL SYNDROME, LEFT: Status: RESOLVED | Noted: 2024-01-29 | Resolved: 2024-06-14

## 2024-06-14 PROBLEM — F40.10 SOCIAL PHOBIA: Status: ACTIVE | Noted: 2024-05-01

## 2024-06-14 PROBLEM — M79.7 FIBROMYALGIA: Status: RESOLVED | Noted: 2023-04-19 | Resolved: 2024-06-14

## 2024-06-14 PROBLEM — L03.115 BILATERAL LOWER LEG CELLULITIS: Status: RESOLVED | Noted: 2023-07-15 | Resolved: 2024-06-14

## 2024-06-14 PROBLEM — M76.61 ACHILLES TENDINITIS, RIGHT LEG: Status: RESOLVED | Noted: 2024-01-23 | Resolved: 2024-06-14

## 2024-06-14 PROBLEM — L03.116 BILATERAL LOWER LEG CELLULITIS: Status: RESOLVED | Noted: 2023-07-15 | Resolved: 2024-06-14

## 2024-06-14 PROBLEM — M62.830 BACK MUSCLE SPASM: Status: RESOLVED | Noted: 2024-05-21 | Resolved: 2024-06-14

## 2024-06-14 LAB
ALBUMIN SERPL-MCNC: 4.1 G/DL (ref 3.5–4.6)
ALP SERPL-CCNC: 111 U/L (ref 40–130)
ALT SERPL-CCNC: 14 U/L (ref 0–33)
ANION GAP SERPL CALCULATED.3IONS-SCNC: 9 MEQ/L (ref 9–15)
AST SERPL-CCNC: 15 U/L (ref 0–35)
BASOPHILS # BLD: 0.1 K/UL (ref 0–0.2)
BASOPHILS NFR BLD: 0.5 %
BILIRUB SERPL-MCNC: <0.2 MG/DL (ref 0.2–0.7)
BUN SERPL-MCNC: 9 MG/DL (ref 6–20)
CALCIUM SERPL-MCNC: 9.4 MG/DL (ref 8.5–9.9)
CHLORIDE SERPL-SCNC: 103 MEQ/L (ref 95–107)
CHOLEST SERPL-MCNC: 169 MG/DL (ref 0–199)
CO2 SERPL-SCNC: 28 MEQ/L (ref 20–31)
CREAT SERPL-MCNC: 1.05 MG/DL (ref 0.5–0.9)
EOSINOPHIL # BLD: 0.1 K/UL (ref 0–0.7)
EOSINOPHIL NFR BLD: 1.3 %
ERYTHROCYTE [DISTWIDTH] IN BLOOD BY AUTOMATED COUNT: 14.6 % (ref 11.5–14.5)
GLOBULIN SER CALC-MCNC: 3.4 G/DL (ref 2.3–3.5)
GLUCOSE FASTING: 87 MG/DL (ref 70–99)
HCT VFR BLD AUTO: 39.3 % (ref 37–47)
HDLC SERPL-MCNC: 42 MG/DL (ref 40–59)
HGB BLD-MCNC: 12 G/DL (ref 12–16)
LDL CHOLESTEROL: 91 MG/DL (ref 0–129)
LYMPHOCYTES # BLD: 2.1 K/UL (ref 1–4.8)
LYMPHOCYTES NFR BLD: 22.6 %
MCH RBC QN AUTO: 25.1 PG (ref 27–31.3)
MCHC RBC AUTO-ENTMCNC: 30.5 % (ref 33–37)
MCV RBC AUTO: 82 FL (ref 79.4–94.8)
MONOCYTES # BLD: 0.6 K/UL (ref 0.2–0.8)
MONOCYTES NFR BLD: 6 %
NEUTROPHILS # BLD: 6.4 K/UL (ref 1.4–6.5)
NEUTS SEG NFR BLD: 69.2 %
PLATELET # BLD AUTO: 514 K/UL (ref 130–400)
POTASSIUM SERPL-SCNC: 4.2 MEQ/L (ref 3.4–4.9)
PROT SERPL-MCNC: 7.5 G/DL (ref 6.3–8)
RBC # BLD AUTO: 4.79 M/UL (ref 4.2–5.4)
SODIUM SERPL-SCNC: 140 MEQ/L (ref 135–144)
TRIGLYCERIDE, FASTING: 181 MG/DL (ref 0–150)
TSH SERPL-MCNC: 3.61 UIU/ML (ref 0.44–3.86)
WBC # BLD AUTO: 9.3 K/UL (ref 4.8–10.8)

## 2024-06-14 PROCEDURE — 85025 COMPLETE CBC W/AUTO DIFF WBC: CPT

## 2024-06-14 PROCEDURE — 84443 ASSAY THYROID STIM HORMONE: CPT

## 2024-06-14 PROCEDURE — 36415 COLL VENOUS BLD VENIPUNCTURE: CPT

## 2024-06-14 PROCEDURE — 99214 OFFICE O/P EST MOD 30 MIN: CPT | Performed by: INTERNAL MEDICINE

## 2024-06-14 PROCEDURE — G8417 CALC BMI ABV UP PARAM F/U: HCPCS | Performed by: INTERNAL MEDICINE

## 2024-06-14 PROCEDURE — 80061 LIPID PANEL: CPT

## 2024-06-14 PROCEDURE — 80053 COMPREHEN METABOLIC PANEL: CPT

## 2024-06-14 PROCEDURE — 1036F TOBACCO NON-USER: CPT | Performed by: INTERNAL MEDICINE

## 2024-06-14 PROCEDURE — G8427 DOCREV CUR MEDS BY ELIG CLIN: HCPCS | Performed by: INTERNAL MEDICINE

## 2024-06-14 RX ORDER — DOXYCYCLINE HYCLATE 100 MG
100 TABLET ORAL 2 TIMES DAILY
Qty: 14 TABLET | Refills: 0 | Status: SHIPPED | OUTPATIENT
Start: 2024-06-14 | End: 2024-06-21

## 2024-06-14 RX ORDER — NYSTATIN 100000 [USP'U]/G
POWDER TOPICAL
Qty: 15 G | Refills: 0 | Status: SHIPPED | OUTPATIENT
Start: 2024-06-14

## 2024-06-14 ASSESSMENT — ENCOUNTER SYMPTOMS
DIARRHEA: 0
BLOOD IN STOOL: 0
WHEEZING: 0
CONSTIPATION: 0
ABDOMINAL PAIN: 0
COUGH: 0
NAUSEA: 0
SINUS PAIN: 0
VOICE CHANGE: 0
CHEST TIGHTNESS: 0
SHORTNESS OF BREATH: 0

## 2024-06-14 NOTE — PROGRESS NOTES
[facility]  [address]   [address]  [place] [address]  Dept: [phone]  Dept Fax: [phone]  Loc: [phone]     6/14/2024    Visit type: Acute care    Reason for Visit: Establish Care, Rash (States that the back on her RT knee is painful, it is all red.), and Health Maintenance (Declines mammo and colonoscopy.)       ASSESSMENT/PLAN   1. Ulcer of left lower extremity, limited to breakdown of skin (HCC)  2. Family history of breast cancer  -     [facility] GILLES DIGITAL SCREEN BILATERAL; Future  3. Migraine without aura and without status migrainosus, not intractable  4. Other specified hypothyroidism  -     TSH; Future  5. Moderate persistent asthma without complication  6. Screening for diabetes mellitus (DM)  -     Comprehensive Metabolic Panel, Fasting; Future  7. Screening for lipid disorders  -     Lipid, Fasting; Future  8. Encounter for screening mammogram for malignant neoplasm of breast  -     [facility] DIGITAL SCREEN BILATERAL; Future  9. Screening for deficiency anemia  -     CBC with Auto Differential; Future  10. Screening for colon cancer  -     Cologuard (Fecal DNA Colorectal Cancer Screening)  11. Pap smear of cervix declined    Return in about 1 week (around 6/21/2024).  Orders Placed This Encounter   Medications    doxycycline hyclate (VIBRA-TABS) 100 MG tablet     Sig: Take 1 tablet by mouth 2 times daily for 7 days     Dispense:  14 tablet     Refill:  0    nystatin (MYCOSTATIN) 778309 UNIT/GM powder     Sig: Apply 3 times daily.     Dispense:  15 g     Refill:  0      Follow-up in 1 week for physical with the blood work ordered mammogram strongly advised to keep that area behind the knee dry.  Subjective        Subjective    HPI:  Patient: Dahlia Myersman is a 49 y.o. female       Review of Systems   Constitutional:  Negative for appetite change, chills, diaphoresis, fatigue and unexpected weight change.   HENT:  Negative for

## 2024-06-17 DIAGNOSIS — J45.40 MODERATE PERSISTENT ASTHMA WITHOUT COMPLICATION: ICD-10-CM

## 2024-06-17 RX ORDER — ALBUTEROL SULFATE 90 UG/1
2 AEROSOL, METERED RESPIRATORY (INHALATION) EVERY 6 HOURS PRN
Qty: 8.5 EACH | Refills: 2 | Status: SHIPPED | OUTPATIENT
Start: 2024-06-17

## 2024-06-17 NOTE — TELEPHONE ENCOUNTER
Pharmacy is requesting medication refill. Please approve or deny this request.    Rx requested:  Requested Prescriptions     Pending Prescriptions Disp Refills    albuterol sulfate HFA (PROVENTIL;VENTOLIN;PROAIR) 108 (90 Base) MCG/ACT inhaler [Pharmacy Med Name: ALBUTEROL HFA (PROAIR) INHALER] 8.5 each 2     Sig: INHALE 2 PUFFS INTO THE LUNGS EVERY 6 HOURS AS NEEDED FOR WHEEZING OR SHORTNESS OF BREATH         Last Office Visit:   5/21/2024      Next Visit Date:  Future Appointments   Date Time Provider Department Center   6/21/2024 11:00 AM Nilson, Kamaleswary, MD [facility]

## 2024-06-25 ENCOUNTER — OFFICE VISIT (OUTPATIENT)
Dept: FAMILY MEDICINE CLINIC | Age: 49
End: 2024-06-25
Payer: COMMERCIAL

## 2024-06-25 VITALS — SYSTOLIC BLOOD PRESSURE: 132 MMHG | HEART RATE: 97 BPM | OXYGEN SATURATION: 98 % | DIASTOLIC BLOOD PRESSURE: 72 MMHG

## 2024-06-25 DIAGNOSIS — F31.81 BIPOLAR II DISORDER (HCC): Chronic | ICD-10-CM

## 2024-06-25 DIAGNOSIS — M76.61 ACHILLES TENDINITIS, RIGHT LEG: Primary | ICD-10-CM

## 2024-06-25 DIAGNOSIS — E66.01 MORBID OBESITY DUE TO EXCESS CALORIES (HCC): Chronic | ICD-10-CM

## 2024-06-25 DIAGNOSIS — F19.11 HISTORY OF SUBSTANCE ABUSE (HCC): Chronic | ICD-10-CM

## 2024-06-25 DIAGNOSIS — F32.A ANXIETY AND DEPRESSION: Chronic | ICD-10-CM

## 2024-06-25 DIAGNOSIS — F41.9 ANXIETY AND DEPRESSION: Chronic | ICD-10-CM

## 2024-06-25 PROCEDURE — G8427 DOCREV CUR MEDS BY ELIG CLIN: HCPCS | Performed by: INTERNAL MEDICINE

## 2024-06-25 PROCEDURE — 99214 OFFICE O/P EST MOD 30 MIN: CPT | Performed by: INTERNAL MEDICINE

## 2024-06-25 PROCEDURE — 1036F TOBACCO NON-USER: CPT | Performed by: INTERNAL MEDICINE

## 2024-06-25 PROCEDURE — G8417 CALC BMI ABV UP PARAM F/U: HCPCS | Performed by: INTERNAL MEDICINE

## 2024-06-25 RX ORDER — MELOXICAM 15 MG/1
15 TABLET ORAL DAILY
Qty: 14 TABLET | Refills: 0 | Status: SHIPPED | OUTPATIENT
Start: 2024-06-25 | End: 2024-07-09

## 2024-06-25 ASSESSMENT — ENCOUNTER SYMPTOMS
ABDOMINAL PAIN: 0
DIARRHEA: 0
NAUSEA: 0
VOICE CHANGE: 0
CHEST TIGHTNESS: 0
SHORTNESS OF BREATH: 0
BLOOD IN STOOL: 0
SINUS PAIN: 0
WHEEZING: 0
COUGH: 0
CONSTIPATION: 0

## 2024-06-25 NOTE — PROGRESS NOTES
person, place, and time. Mental status is at baseline.      Gait: Gait (Right leg is not swollen no redness no warmth slightly tender to touch at the insertion of Achilles tendon) normal.   Psychiatric:         Mood and Affect: Mood normal.         Behavior: Behavior normal.         Thought Content: Thought content normal.         Judgment: Judgment normal.        No results found for any visits on 06/25/24.   Allergies   Allergen Reactions    Beeswax     Gabapentin      AMS, suicidal     Hydroxyzine Hcl Hives    Iodinated Contrast Media Hives    Iv Contrast [Iodides]     Lamictal [Lamotrigine]     Oxcarbazepine     Pcn [Penicillins] Hives    Shellfish-Derived Products     Vancomycin Hives    Cephalexin Rash     Patient has tolerated cefdinir before per patient report    Methylphenidate Rash    Neosporin [Neomycin-Polymyxin-Gramicidin] Rash     Red lines that look infected and spreads    Tramadol Nausea And Vomiting    Voltaren [Diclofenac Sodium] Rash       Current Outpatient Medications:     meloxicam (MOBIC) 15 MG tablet, Take 1 tablet by mouth daily for 14 days, Disp: 14 tablet, Rfl: 0    albuterol sulfate HFA (PROVENTIL;VENTOLIN;PROAIR) 108 (90 Base) MCG/ACT inhaler, INHALE 2 PUFFS INTO THE LUNGS EVERY 6 HOURS AS NEEDED FOR WHEEZING OR SHORTNESS OF BREATH, Disp: 8.5 each, Rfl: 2    nystatin (MYCOSTATIN) 834657 UNIT/GM powder, Apply 3 times daily., Disp: 15 g, Rfl: 0    levothyroxine (SYNTHROID) 88 MCG tablet, TAKE 1 TABLET BY MOUTH EVERY DAY, Disp: 90 tablet, Rfl: 1    eszopiclone (LUNESTA) 2 MG TABS, Take 1 tablet by mouth nightly., Disp: , Rfl:     cyclobenzaprine (FLEXERIL) 10 MG tablet, Take 1 tablet by mouth 3 times daily as needed for Muscle spasms, Disp: 90 tablet, Rfl: 1    albuterol (PROVENTIL) (2.5 MG/3ML) 0.083% nebulizer solution, Take 3 mLs by nebulization 4 times daily as needed for Wheezing, Disp: 120 each, Rfl: 1    meloxicam (MOBIC) 15 MG tablet, TAKE 1 TABLET BY MOUTH EVERY DAY, Disp: 30

## 2024-07-10 ENCOUNTER — OFFICE VISIT (OUTPATIENT)
Dept: ORTHOPEDIC SURGERY | Age: 49
End: 2024-07-10
Payer: COMMERCIAL

## 2024-07-10 VITALS
WEIGHT: 293 LBS | HEIGHT: 65 IN | TEMPERATURE: 97.7 F | BODY MASS INDEX: 48.82 KG/M2 | OXYGEN SATURATION: 98 % | HEART RATE: 96 BPM

## 2024-07-10 DIAGNOSIS — M76.61 ACHILLES TENDINITIS, RIGHT LEG: Primary | ICD-10-CM

## 2024-07-10 PROCEDURE — G8417 CALC BMI ABV UP PARAM F/U: HCPCS | Performed by: PHYSICIAN ASSISTANT

## 2024-07-10 PROCEDURE — 99214 OFFICE O/P EST MOD 30 MIN: CPT | Performed by: PHYSICIAN ASSISTANT

## 2024-07-10 PROCEDURE — 1036F TOBACCO NON-USER: CPT | Performed by: PHYSICIAN ASSISTANT

## 2024-07-10 PROCEDURE — G8427 DOCREV CUR MEDS BY ELIG CLIN: HCPCS | Performed by: PHYSICIAN ASSISTANT

## 2024-07-10 ASSESSMENT — ENCOUNTER SYMPTOMS
EYES NEGATIVE: 1
GASTROINTESTINAL NEGATIVE: 1
RESPIRATORY NEGATIVE: 1

## 2024-07-10 NOTE — PROGRESS NOTES
Dahlia Quinn (:  1975) is a 49 y.o. female,Established patient, here for evaluation of the following chief complaint(s):  Ankle Pain (Right achilles pain. Present for approximately 2 weeks. Pain is currently /10 )      Assessment & Plan   1. Achilles tendinitis, right leg  -     Ambulatory referral to Physical Therapy      No follow-ups on file.       Subjective   This a 49-year-old female complaining of right posterior ankle pain.  Patient states she has had Achilles tendinitis previously.  She states several days ago she was just walking through her house and felt a pop and pulling sensation in her right heel.  She states she was having difficulty dorsiflexing and plantar flexing her right foot at the ankle.  States those symptoms are improving.  She attended physical therapy previously and has restarted her home exercise program.  She denies change in sensation of the foot.        Review of Systems   Constitutional: Negative.    HENT: Negative.     Eyes: Negative.    Respiratory: Negative.     Gastrointestinal: Negative.    Genitourinary: Negative.    Musculoskeletal: Negative.    Psychiatric/Behavioral: Negative.            Objective   Physical Exam  Musculoskeletal:      Comments: Right lower leg-no ecchymosis.  No swelling.  Tenderness with palpation at gastrocnemius Achilles musculotendinous junction.  No palpable divot.  Good dorsiflexion and plantarflexion range of motion.  Little pain with resisted plantarflexion of the right foot at the ankle.  Sensation is intact distally to light capillary refills brisk     Explained to the patient she does have Achilles tendinitis.  She would like to restart physical therapy.  I will place an order for physical therapy at [facility].  She will follow-up with me in a month as her symptoms dictate.       On this date 7/10/2024 I have spent 35 minutes reviewing previous notes, test results and face to face with the patient discussing the diagnosis and

## 2024-07-14 PROBLEM — Z12.31 ENCOUNTER FOR SCREENING MAMMOGRAM FOR MALIGNANT NEOPLASM OF BREAST: Status: RESOLVED | Noted: 2024-06-14 | Resolved: 2024-07-14

## 2024-07-26 ENCOUNTER — HOSPITAL ENCOUNTER (OUTPATIENT)
Dept: PHYSICAL THERAPY | Age: 49
Setting detail: THERAPIES SERIES
Discharge: HOME | End: 2024-07-26
Payer: COMMERCIAL

## 2024-07-26 PROCEDURE — 97140 MANUAL THERAPY 1/> REGIONS: CPT

## 2024-07-26 PROCEDURE — 97110 THERAPEUTIC EXERCISES: CPT

## 2024-07-26 PROCEDURE — 97162 PT EVAL MOD COMPLEX 30 MIN: CPT

## 2024-07-26 NOTE — PROGRESS NOTES
exercise program, Modalities  Modalities: Heat/Cold, Ultrasound, E-stim - unattended     Frequency / Duration:  Patient to be seen 1-2 for 4-6 weeks      Eval Complexity:    Decision Making: Medium Complexity    PT Treatment Completed:  Exercises:      Treatment Reasoning    Exercise 1: *PROM DF with strap x 3 H30  Exercise 2: *IV with strap x 3 H30  Exercise 3: *EV with strap x 3 H30                          Gait: (CPT 71063)  Treatment Reasoning    Gait Training 1: Pt ambulated with her rollator walker with decreased step length and stance time R LE. Pt demonstrated an antalgic gait pattern with decreased stance time R LE when she ambulated short distances in the tx room when walking without AD from chair to tx table and back to chair approx 5-6 steps each direction.                     Manual Therapy: (CPT 03899) Treatment Reasoning     Other: Applied KT tape with pt in prone with 1st piece anchored mid arch with pulled 25% proximally to midcalf; 2nd strip applied horizontally over achilles tendon with max tension in center for pain control; 3rd strip with center over arch with max tension and light tension on ends placed both medially and laterally for pain control and support.                      Therapy Time  Individual Time In:     9:30am     Individual Time Out:  10:30am  Minutes:  60 min         Therapist Signature: Rody Aparicio PT    Date: 7/26/2024     I certify that the above Therapy Services are being furnished while the patient is under my care. I agree with the treatment plan and certify that this therapy is necessary.      Physician's Signature:  ___________________________   Date:_______                                                                   Luke Gamble PA        Physician Comments: _______________________________________________    Please sign and return to [facility] PHYSICAL THERAPY.  Please fax to the location listed below. THANK YOU for this referral!    [facility]

## 2024-08-05 ENCOUNTER — HOSPITAL ENCOUNTER (OUTPATIENT)
Dept: PHYSICAL THERAPY | Age: 49
Setting detail: THERAPIES SERIES
Discharge: HOME | End: 2024-08-05
Payer: COMMERCIAL

## 2024-08-05 PROCEDURE — 97110 THERAPEUTIC EXERCISES: CPT

## 2024-08-05 PROCEDURE — 97140 MANUAL THERAPY 1/> REGIONS: CPT

## 2024-08-05 PROCEDURE — 97035 APP MDLTY 1+ULTRASOUND EA 15: CPT

## 2024-08-05 ASSESSMENT — PAIN DESCRIPTION - DESCRIPTORS: DESCRIPTORS: ACHING

## 2024-08-05 ASSESSMENT — PAIN DESCRIPTION - LOCATION: LOCATION: ANKLE

## 2024-08-05 ASSESSMENT — PAIN DESCRIPTION - PAIN TYPE: TYPE: ACUTE PAIN

## 2024-08-05 ASSESSMENT — PAIN DESCRIPTION - ORIENTATION: ORIENTATION: RIGHT

## 2024-08-05 NOTE — PROGRESS NOTES
Physical Therapy  Daily Treatment Note  Date: 2024  Patient Name: Dahlia Quinn  MRN: [id number]     :   1975    Referring Physician: Luke Gamble PA Bas[place]   PCP: Carlos Devine MD    Medical Diagnosis: No admission diagnoses are documented for this encounter.    Treatment Diagnosis: Right ankle achilles tendinitis      Insurance: Payor: [facility] / Plan: [facility] MEDICAID / Product Type: *No Product type* /   Insurance ID: [id number] - (Medicaid Managed)    Subjective:   General  Referring Provider (secondary): Mavis  PT Visit Information  Onset Date: 24  Total # of Visits Approved: 10  Total # of Visits to Date: 2  Plan of Care/Certification Expiration Date: 24  No Show: 0  Canceled Appointment: 0  Referring Provider (secondary): Mavis  Subjective  Subjective: Pt. states her pain is currently 4-5/10.  Pain when first getting out bed 8/10.  Pain Screening  Patient Currently in Pain: Yes  Pain Assessment: 0-10  Pain Level:  (4-5/10)  Pain Type: Acute pain  Pain Location: Ankle  Pain Orientation: Right  Pain Descriptors: Aching       Treatment Activities:  Exercises:      Treatment Reasoning    Exercise 1: *PROM DF with strap x 3 H30  Exercise 2: *IV with strap x 3 H30  Exercise 3: *EV with strap x 3 H30  Exercise 4: Ankle AROM DF/PF x 10 hold 3 sec  Exercise 5: Ankle AROM INV/EV x 10 hold 3 sec  Exercise 6: Ankle circles x 10 ea way                          Manual Therapy: (CPT 35863) Treatment Reasoning     Soft Tissue Mobilizaton: STM and IASTM to gastroc and PF along with cross friction at achilles for pain reduction.  Passive gastroc stretch low load long duration to inc mobility and dec pain  Other: KT tape in prone 2 I strip gastroc muscle belly with distal pull max tension through PF anchored met heads to dec pain and tightness.  I strip horizontal max tension over achilles to dec tightness. X PF max tension to dec tightness and pain

## 2024-08-06 ENCOUNTER — OFFICE VISIT (OUTPATIENT)
Dept: FAMILY MEDICINE CLINIC | Age: 49
End: 2024-08-06
Payer: COMMERCIAL

## 2024-08-06 VITALS
HEART RATE: 94 BPM | HEIGHT: 65 IN | BODY MASS INDEX: 48.82 KG/M2 | OXYGEN SATURATION: 98 % | SYSTOLIC BLOOD PRESSURE: 136 MMHG | WEIGHT: 293 LBS | DIASTOLIC BLOOD PRESSURE: 78 MMHG | TEMPERATURE: 97.6 F

## 2024-08-06 DIAGNOSIS — M54.42 CHRONIC BILATERAL LOW BACK PAIN WITH BILATERAL SCIATICA: ICD-10-CM

## 2024-08-06 DIAGNOSIS — E03.9 HYPOTHYROIDISM, UNSPECIFIED TYPE: Chronic | ICD-10-CM

## 2024-08-06 DIAGNOSIS — M54.41 CHRONIC BILATERAL LOW BACK PAIN WITH BILATERAL SCIATICA: ICD-10-CM

## 2024-08-06 DIAGNOSIS — K21.9 GASTROESOPHAGEAL REFLUX DISEASE, UNSPECIFIED WHETHER ESOPHAGITIS PRESENT: ICD-10-CM

## 2024-08-06 DIAGNOSIS — L21.0 DANDRUFF IN ADULT: Primary | ICD-10-CM

## 2024-08-06 DIAGNOSIS — G89.29 CHRONIC BILATERAL LOW BACK PAIN WITH BILATERAL SCIATICA: ICD-10-CM

## 2024-08-06 DIAGNOSIS — F41.9 ANXIETY AND DEPRESSION: ICD-10-CM

## 2024-08-06 DIAGNOSIS — F32.A ANXIETY AND DEPRESSION: ICD-10-CM

## 2024-08-06 DIAGNOSIS — M15.9 PRIMARY OSTEOARTHRITIS INVOLVING MULTIPLE JOINTS: Chronic | ICD-10-CM

## 2024-08-06 DIAGNOSIS — M62.830 BACK MUSCLE SPASM: ICD-10-CM

## 2024-08-06 DIAGNOSIS — J45.40 MODERATE PERSISTENT ASTHMA WITHOUT COMPLICATION: Chronic | ICD-10-CM

## 2024-08-06 PROBLEM — F31.81 BIPOLAR II DISORDER (HCC): Status: RESOLVED | Noted: 2024-05-01 | Resolved: 2024-08-06

## 2024-08-06 PROBLEM — M76.61 ACHILLES TENDINITIS, RIGHT LEG: Status: RESOLVED | Noted: 2024-06-25 | Resolved: 2024-08-06

## 2024-08-06 PROCEDURE — G8427 DOCREV CUR MEDS BY ELIG CLIN: HCPCS | Performed by: INTERNAL MEDICINE

## 2024-08-06 PROCEDURE — 99214 OFFICE O/P EST MOD 30 MIN: CPT | Performed by: INTERNAL MEDICINE

## 2024-08-06 PROCEDURE — G8417 CALC BMI ABV UP PARAM F/U: HCPCS | Performed by: INTERNAL MEDICINE

## 2024-08-06 PROCEDURE — 1036F TOBACCO NON-USER: CPT | Performed by: INTERNAL MEDICINE

## 2024-08-06 RX ORDER — MELOXICAM 15 MG/1
15 TABLET ORAL DAILY
Qty: 90 TABLET | Refills: 1 | Status: SHIPPED | OUTPATIENT
Start: 2024-08-06

## 2024-08-06 RX ORDER — DEXAMETHASONE 4 MG/1
2 TABLET ORAL 2 TIMES DAILY
Qty: 12 G | Refills: 2 | Status: SHIPPED | OUTPATIENT
Start: 2024-08-06

## 2024-08-06 RX ORDER — LEVOTHYROXINE SODIUM 88 UG/1
88 TABLET ORAL DAILY
Qty: 90 TABLET | Refills: 1 | Status: SHIPPED | OUTPATIENT
Start: 2024-08-06

## 2024-08-06 RX ORDER — ALBUTEROL SULFATE 90 UG/1
2 AEROSOL, METERED RESPIRATORY (INHALATION) EVERY 6 HOURS PRN
Qty: 8.5 EACH | Refills: 2 | Status: SHIPPED | OUTPATIENT
Start: 2024-08-06

## 2024-08-06 RX ORDER — CYCLOBENZAPRINE HCL 10 MG
10 TABLET ORAL 3 TIMES DAILY PRN
Qty: 90 TABLET | Refills: 1 | Status: SHIPPED | OUTPATIENT
Start: 2024-08-06

## 2024-08-06 RX ORDER — CETIRIZINE HYDROCHLORIDE 10 MG/1
10 TABLET ORAL DAILY
Qty: 90 TABLET | Refills: 1 | Status: SHIPPED | OUTPATIENT
Start: 2024-08-06

## 2024-08-06 RX ORDER — OMEPRAZOLE 40 MG/1
40 CAPSULE, DELAYED RELEASE ORAL
Qty: 90 CAPSULE | Refills: 1 | Status: SHIPPED | OUTPATIENT
Start: 2024-08-06

## 2024-08-06 RX ORDER — MONTELUKAST SODIUM 10 MG/1
10 TABLET ORAL NIGHTLY
Qty: 90 TABLET | Refills: 1 | Status: SHIPPED | OUTPATIENT
Start: 2024-08-06

## 2024-08-06 RX ORDER — CLOTRIMAZOLE 1 G/ML
SOLUTION TOPICAL
Qty: 100 ML | Refills: 1 | Status: SHIPPED | OUTPATIENT
Start: 2024-08-06

## 2024-08-06 ASSESSMENT — ENCOUNTER SYMPTOMS
ABDOMINAL PAIN: 0
WHEEZING: 0
CHEST TIGHTNESS: 0
CONSTIPATION: 0
SHORTNESS OF BREATH: 0
COUGH: 0
VOICE CHANGE: 0
SINUS PAIN: 0
BLOOD IN STOOL: 0
DIARRHEA: 0
NAUSEA: 0

## 2024-08-06 NOTE — PROGRESS NOTES
[facility] PRIMARY CARE  [address]   [address]  [facility] [address]  Dept: [phone]  Dept Fax: [phone]  Loc: [phone]     8/6/2024    Visit type: Acute care    Reason for Visit: 3 Month Follow-Up (Patient presents today for routine 3 month follow up) and Skin Problem (Patient notes flaky skin on face x1 week )       ASSESSMENT/PLAN   1. Dandruff in adult  2. Gastroesophageal reflux disease, unspecified whether esophagitis present  -     omeprazole (PRILOSEC) 40 MG delayed release capsule; Take 1 capsule by mouth every morning (before breakfast), Disp-90 capsule, R-1Normal  3. Moderate persistent asthma without complication  Comments:  Stable refill the medications  Orders:  -     montelukast (SINGULAIR) 10 MG tablet; Take 1 tablet by mouth nightly, Disp-90 tablet, R-1Normal  -     cetirizine (ZYRTEC) 10 MG tablet; Take 1 tablet by mouth daily, Disp-90 tablet, R-1Normal  -     albuterol sulfate HFA (PROVENTIL;VENTOLIN;PROAIR) 108 (90 Base) MCG/ACT inhaler; Inhale 2 puffs into the lungs every 6 hours as needed for Wheezing or Shortness of Breath, Disp-8.5 each, R-2Normal  4. Chronic bilateral low back pain with bilateral sciatica  Comments:  Pain management visit scheduled for further management.   Orders:  -     meloxicam (MOBIC) 15 MG tablet; Take 1 tablet by mouth daily, Disp-90 tablet, R-1DX Code Needed  .Normal  5. Primary osteoarthritis involving multiple joints  Comments:  Stable to refill the medications  Orders:  -     meloxicam (MOBIC) 15 MG tablet; Take 1 tablet by mouth daily, Disp-90 tablet, R-1DX Code Needed  .Normal  6. Hypothyroidism, unspecified type  Comments:  Stable refill of medications  Orders:  -     levothyroxine (SYNTHROID) 88 MCG tablet; Take 1 tablet by mouth daily, Disp-90 tablet, R-1Normal  7. Back muscle spasm  -     cyclobenzaprine (FLEXERIL) 10 MG tablet; Take 1 tablet by mouth 3 times daily as needed for Muscle spasms, Disp-90

## 2024-08-08 ENCOUNTER — HOSPITAL ENCOUNTER (OUTPATIENT)
Dept: PHYSICAL THERAPY | Age: 49
Setting detail: THERAPIES SERIES
Discharge: HOME | End: 2024-08-08
Payer: COMMERCIAL

## 2024-08-08 PROCEDURE — 97035 APP MDLTY 1+ULTRASOUND EA 15: CPT

## 2024-08-08 PROCEDURE — 97140 MANUAL THERAPY 1/> REGIONS: CPT

## 2024-08-08 PROCEDURE — 97110 THERAPEUTIC EXERCISES: CPT

## 2024-08-08 ASSESSMENT — PAIN DESCRIPTION - ORIENTATION: ORIENTATION: RIGHT

## 2024-08-08 ASSESSMENT — PAIN DESCRIPTION - DESCRIPTORS: DESCRIPTORS: TIGHTNESS;ACHING

## 2024-08-08 ASSESSMENT — PAIN SCALES - GENERAL: PAINLEVEL_OUTOF10: 5

## 2024-08-08 ASSESSMENT — PAIN DESCRIPTION - PAIN TYPE: TYPE: ACUTE PAIN

## 2024-08-08 ASSESSMENT — PAIN DESCRIPTION - LOCATION: LOCATION: ANKLE

## 2024-08-08 NOTE — PROGRESS NOTES
improve pts mobiltiy  Long Term Goal 3: Improve pts right ankle strength to 4+/5 or > so that pt can ambulate >300'x 1 with AD and equal step length B LE  Long Term Goal 4: Improve pts Mod LEFS from 64% disability score to <25% by time of discharge  Long Term Goal 5: Pt indep with an advanced HEP to progress with her mobility and decrease her pain  Patient Goals   Patient Goals : To get rid of her right ankle pain and be able to walk like normal again    Plan:    Physical Therapy Plan  Plan weeks: 4-6  Current Treatment Recommendations: Strengthening, ROM, Balance training, Functional mobility training, Neuromuscular re-education, Gait training, Endurance training, Manual, Pain management, Home exercise program, Modalities  Additional Comments: KT tape        Therapy Time:   Individual Concurrent Group Co-treatment   Time In  930         Time Out  1015         Minutes  45                 Angi Montemayor PTA

## 2024-08-12 ENCOUNTER — HOSPITAL ENCOUNTER (OUTPATIENT)
Dept: PHYSICAL THERAPY | Age: 49
Setting detail: THERAPIES SERIES
Discharge: HOME | End: 2024-08-12
Payer: COMMERCIAL

## 2024-08-12 PROCEDURE — 97110 THERAPEUTIC EXERCISES: CPT

## 2024-08-12 PROCEDURE — 97035 APP MDLTY 1+ULTRASOUND EA 15: CPT

## 2024-08-12 PROCEDURE — 97140 MANUAL THERAPY 1/> REGIONS: CPT

## 2024-08-12 ASSESSMENT — PAIN SCALES - GENERAL: PAINLEVEL_OUTOF10: 4

## 2024-08-12 ASSESSMENT — PAIN DESCRIPTION - PAIN TYPE: TYPE: ACUTE PAIN

## 2024-08-12 ASSESSMENT — PAIN DESCRIPTION - ORIENTATION: ORIENTATION: RIGHT

## 2024-08-12 NOTE — PROGRESS NOTES
Physical Therapy  Daily Treatment Note  Date: 2024  Patient Name: Dahlia Quinn  MRN: [id number]     :   1975    Referring Physician: Luke Gamble PA Basinski   PCP: Carlos Devine MD    Medical Diagnosis: No admission diagnoses are documented for this encounter.    Treatment Diagnosis: Right ankle achilles tendinitis      Insurance: Payor: [facility] / Plan: [facility] MEDICAID / Product Type: *No Product type* /   Insurance ID: [id number] - (Medicaid Managed)    Subjective:   General  Referring Provider (secondary): Mavis  PT Visit Information  Onset Date: 24  Total # of Visits Approved: 10  Total # of Visits to Date: 4  Plan of Care/Certification Expiration Date: 24  No Show: 0  Canceled Appointment: 0  Referring Provider (secondary): Mavis  Subjective  Subjective: Pt. states her pain is getting better today pain 5/10.  Pain Screening  Patient Currently in Pain: Yes  Pain Assessment: 0-10  Pain Level: 4  Pain Type: Acute pain  Pain Location: Ankle  Pain Orientation: Right       Treatment Activities:  Exercises:      Treatment Reasoning    Exercise 1: Long sit gastroc stretch with MH in place 30 sec x 3 VC for inc hold time  Exercise 2: Long sit INV stretch with MH hold 30 sec x 3  Exercise 3: Long sit EV stretch with MH hold 30 sec x 3  Exercise 6: Standing Gastroc and soleus stretch  at Rollator 20 sec x2 ea                          Gait: (CPT 40358)  Treatment Reasoning    Gait Training 1: Pt. ambulated with Rollator with slow cautious pattern dec stance time R fosusing on heel and toe pattern  120'  no inc in pain                     Manual Therapy: (CPT 70528) Treatment Reasoning     Soft Tissue Mobilizaton: STM  to gastroc, peroneals, and PF along with cross friction at achilles for pain reduction.  Passive gastroc stretch low load long duration to inc mobility and dec pain  Other: KT tape in prone 2 I strip gastroc muscle belly with distal pull max

## 2024-08-14 ENCOUNTER — OFFICE VISIT (OUTPATIENT)
Dept: ORTHOPEDIC SURGERY | Age: 49
End: 2024-08-14
Payer: COMMERCIAL

## 2024-08-14 VITALS
HEIGHT: 65 IN | TEMPERATURE: 98.7 F | OXYGEN SATURATION: 96 % | HEART RATE: 92 BPM | WEIGHT: 293 LBS | BODY MASS INDEX: 48.82 KG/M2

## 2024-08-14 DIAGNOSIS — G56.02 LEFT CARPAL TUNNEL SYNDROME: ICD-10-CM

## 2024-08-14 DIAGNOSIS — G56.02 CARPAL TUNNEL SYNDROME, LEFT: Primary | ICD-10-CM

## 2024-08-14 PROCEDURE — 20526 THER INJECTION CARP TUNNEL: CPT | Performed by: PHYSICIAN ASSISTANT

## 2024-08-14 PROCEDURE — G8417 CALC BMI ABV UP PARAM F/U: HCPCS | Performed by: PHYSICIAN ASSISTANT

## 2024-08-14 PROCEDURE — G8427 DOCREV CUR MEDS BY ELIG CLIN: HCPCS | Performed by: PHYSICIAN ASSISTANT

## 2024-08-14 PROCEDURE — 99214 OFFICE O/P EST MOD 30 MIN: CPT | Performed by: PHYSICIAN ASSISTANT

## 2024-08-14 PROCEDURE — 1036F TOBACCO NON-USER: CPT | Performed by: PHYSICIAN ASSISTANT

## 2024-08-14 RX ORDER — TRIAMCINOLONE ACETONIDE 40 MG/ML
40 INJECTION, SUSPENSION INTRA-ARTICULAR; INTRAMUSCULAR ONCE
Status: COMPLETED | OUTPATIENT
Start: 2024-08-14 | End: 2024-08-14

## 2024-08-14 RX ORDER — LIDOCAINE HYDROCHLORIDE 10 MG/ML
1 INJECTION, SOLUTION INFILTRATION; PERINEURAL ONCE
Status: COMPLETED | OUTPATIENT
Start: 2024-08-14 | End: 2024-08-14

## 2024-08-14 RX ADMIN — TRIAMCINOLONE ACETONIDE 40 MG: 40 INJECTION, SUSPENSION INTRA-ARTICULAR; INTRAMUSCULAR at 15:59

## 2024-08-14 RX ADMIN — LIDOCAINE HYDROCHLORIDE 1 ML: 10 INJECTION, SOLUTION INFILTRATION; PERINEURAL at 16:00

## 2024-08-14 ASSESSMENT — ENCOUNTER SYMPTOMS
EYES NEGATIVE: 1
RESPIRATORY NEGATIVE: 1
GASTROINTESTINAL NEGATIVE: 1

## 2024-08-14 NOTE — PROGRESS NOTES
negative.  Decree sensation to light touch at the distal aspect of the index and middle fingers.  Capillary refill is brisk.     Explained to the patient she does have carpal tunnel syndrome.  She does not want to proceed with surgery.  We proceeded today with bilateral carpal tunnel cortisone injections.  She will continue wearing her wrist braces at bedtime.  She will follow-up as her symptoms dictate.     Procedure-after sterile prep and under aseptic technique 1 cc 1% lidocaine and 1 cc 40 mg/mL Kenalog are injected into bilateral carpal tunnels.  Patient tolerated procedure well.  On this date 8/14/2024 I have spent 35 minutes reviewing previous notes, test results and face to face with the patient discussing the diagnosis and importance of compliance with the treatment plan as well as documenting on the day of the visit.  Duration of time required for the bilateral carpal tunnel cortisone injections is not included in the aforementioned 35 minutes.      An electronic signature was used to authenticate this note.    --NOEMI Gauthier

## 2024-08-15 ENCOUNTER — HOSPITAL ENCOUNTER (OUTPATIENT)
Dept: PHYSICAL THERAPY | Age: 49
Setting detail: THERAPIES SERIES
Discharge: HOME | End: 2024-08-15
Payer: COMMERCIAL

## 2024-08-15 PROCEDURE — 97110 THERAPEUTIC EXERCISES: CPT

## 2024-08-15 PROCEDURE — 97140 MANUAL THERAPY 1/> REGIONS: CPT

## 2024-08-15 PROCEDURE — 97035 APP MDLTY 1+ULTRASOUND EA 15: CPT

## 2024-08-15 ASSESSMENT — PAIN SCALES - GENERAL: PAINLEVEL_OUTOF10: 7

## 2024-08-15 ASSESSMENT — PAIN DESCRIPTION - ORIENTATION: ORIENTATION: RIGHT

## 2024-08-15 ASSESSMENT — PAIN DESCRIPTION - LOCATION: LOCATION: LEG;ANKLE

## 2024-08-15 ASSESSMENT — PAIN DESCRIPTION - DESCRIPTORS: DESCRIPTORS: TIGHTNESS

## 2024-08-15 NOTE — PROGRESS NOTES
Physical Therapy  Daily Treatment Note  Date: 8/15/2024  Patient Name: Dahlia Quinn  MRN: [id number]     :   1975    Referring Physician: Luke Gamble PA Basinski   PCP: Carlos Devine MD    Medical Diagnosis: No admission diagnoses are documented for this encounter.    Treatment Diagnosis: Right ankle achilles tendinitis      Insurance: Payor: [facility] / Plan: [facility] MEDICAID / Product Type: *No Product type* /   Insurance ID: [id number] - (Medicaid Managed)    Subjective:   General  Referring Provider (secondary): Mavis  PT Visit Information  Onset Date: 24  Total # of Visits Approved: 10  Plan of Care/Certification Expiration Date: 24  Referring Provider (secondary): Mavis  Subjective  Subjective: Pt. reports she had a bad night with restless legs and was unable to get to sleep until 2:00 P.M.  Pt. states she woke up with her pain like a 10 and the whole leg feels tight with alot of tightness reported in her ant R thigh.  Pain Screening  Patient Currently in Pain: Yes  Pain Assessment: 0-10  Pain Level: 7  Pain Type: Acute pain  Pain Location: Leg, Ankle  Pain Orientation: Right  Pain Descriptors: Tightness       Treatment Activities:  Exercises:      Treatment Reasoning    Exercise 1: Long sit gastroc stretch with MH in place 30 sec x 3 VC for inc hold time  Exercise 2: Long sit INV stretch with MH hold 30 sec x 3  Exercise 3: Long sit EV stretch with MH hold 30 sec x 3  Exercise 4: Supine HS stretch R hold 1 min x 2  Exercise 5: Supine Hip flexor stretch R hold 60 sec x 2  Exercise 6: L sidelying R IT band stretch hold 60 sec x 2                          Manual Therapy: (CPT 79200) Treatment Reasoning     Soft Tissue Mobilizaton: Passive stretch to R hip and leg HS with OP at foot for gastroc stretch, adductors, IT band and in sidelying for hip flexor and qauds, MFR to R gastroc, and PF with crossfriction at achilles to dec tightness and pain  Other:

## 2024-08-19 ENCOUNTER — HOSPITAL ENCOUNTER (OUTPATIENT)
Dept: PHYSICAL THERAPY | Age: 49
Setting detail: THERAPIES SERIES
Discharge: HOME | End: 2024-08-19
Payer: COMMERCIAL

## 2024-08-19 PROCEDURE — 97140 MANUAL THERAPY 1/> REGIONS: CPT

## 2024-08-19 PROCEDURE — 97110 THERAPEUTIC EXERCISES: CPT

## 2024-08-19 PROCEDURE — 97035 APP MDLTY 1+ULTRASOUND EA 15: CPT

## 2024-08-19 ASSESSMENT — PAIN DESCRIPTION - LOCATION: LOCATION: ANKLE

## 2024-08-19 ASSESSMENT — PAIN DESCRIPTION - ORIENTATION: ORIENTATION: RIGHT

## 2024-08-19 ASSESSMENT — PAIN DESCRIPTION - PAIN TYPE: TYPE: ACUTE PAIN

## 2024-08-19 ASSESSMENT — PAIN DESCRIPTION - DESCRIPTORS: DESCRIPTORS: TIGHTNESS

## 2024-08-19 NOTE — PROGRESS NOTES
Physical Therapy  Daily Treatment Note  Date: 2024  Patient Name: Dahlia Quinn  MRN: [id number]     :   1975    Referring Physician: Luke Gamble PA Basinski   PCP: Carlos Devine MD    Medical Diagnosis: No admission diagnoses are documented for this encounter.    Treatment Diagnosis: Right ankle achilles tendinitis      Insurance: Payor: [facility] / Plan: [facility] MEDICAID / Product Type: *No Product type* /   Insurance ID: [id number] - (Medicaid Managed)    Subjective:   General  Referring Provider (secondary): Mavis  PT Visit Information  Onset Date: 24  Total # of Visits Approved: 10  Total # of Visits to Date: 5  Plan of Care/Certification Expiration Date: 24  No Show: 0  Canceled Appointment: 0  Referring Provider (secondary): Mavis  Subjective  Subjective: Pt. states pain today 6.5/10 upon arrival.  Pt. states pain at most is 8.5/10.  Reports her legs cont to be restless but using heat and stretches for hip and ankle are helping.  Pain Screening  Patient Currently in Pain: Yes  Pain Assessment: 0-10  Pain Level:  (6.5/10)  Pain Type: Acute pain  Pain Location: Ankle  Pain Orientation: Right  Pain Descriptors: Tightness       Treatment Activities:  Exercises:      Treatment Reasoning    Exercise 1: Long sit gastroc stretch with MH in place 45 sec x 3 VC for inc hold time  Exercise 2: Long sit INV stretch with MH hold  45 sec x 3  Exercise 3: Long sit EV stretch with MH hold  45 sec x 3  Exercise 4: seated heel and toe raises 2 x10 hold 5 sec  Exercise 5: toe curls x 20  Exercise 6: towel swishes x 20                          Gait: (CPT 73179)  Treatment Reasoning    Gait Training 1: Pt. amublated with Rollator slow pacing with focus  on heel toe pattern dec DF R was able to amb 150' then had to rest as pain inc from 6.5/10 to 7.5/10.                     Manual Therapy: (CPT 34534) Treatment Reasoning     Soft Tissue Mobilizaton: STM and IASTM to R

## 2024-08-22 ENCOUNTER — HOSPITAL ENCOUNTER (OUTPATIENT)
Dept: PHYSICAL THERAPY | Age: 49
Setting detail: THERAPIES SERIES
Discharge: HOME | End: 2024-08-22
Payer: COMMERCIAL

## 2024-08-22 PROCEDURE — 97110 THERAPEUTIC EXERCISES: CPT

## 2024-08-22 PROCEDURE — 97140 MANUAL THERAPY 1/> REGIONS: CPT

## 2024-08-22 PROCEDURE — 97035 APP MDLTY 1+ULTRASOUND EA 15: CPT

## 2024-08-22 ASSESSMENT — PAIN DESCRIPTION - ORIENTATION: ORIENTATION: RIGHT

## 2024-08-22 ASSESSMENT — PAIN DESCRIPTION - DESCRIPTORS: DESCRIPTORS: TIGHTNESS;SORE

## 2024-08-22 ASSESSMENT — PAIN DESCRIPTION - LOCATION: LOCATION: ANKLE

## 2024-08-22 ASSESSMENT — PAIN DESCRIPTION - PAIN TYPE: TYPE: ACUTE PAIN

## 2024-08-22 ASSESSMENT — PAIN SCALES - GENERAL: PAINLEVEL_OUTOF10: 6

## 2024-08-22 NOTE — PROGRESS NOTES
Physical Therapy  Daily Treatment Note  Date: 2024  Patient Name: Dahlia Quinn  MRN: [id number]     :   1975    Referring Physician: Luke Gamble PA Basinski   PCP: Carlos Devine MD    Medical Diagnosis: No admission diagnoses are documented for this encounter.    Treatment Diagnosis: Right ankle achilles tendinitis      Insurance: Payor: [facility] / Plan: [facility] MEDICAID / Product Type: *No Product type* /   Insurance ID: [id number] - (Medicaid Managed)    Subjective:  General  Referring Provider (secondary): Mavis  PT Visit Information  Onset Date: 24  Total # of Visits Approved: 10  Total # of Visits to Date: 7  Plan of Care/Certification Expiration Date: 24  No Show: 0  Canceled Appointment: 0  Referring Provider (secondary): Mavis  Subjective  Subjective: Pain today is more in lateral lower leg.  6/10.  Pain Screening  Patient Currently in Pain: Yes  Pain Assessment: 0-10  Pain Level: 6  Pain Type: Acute pain  Pain Location: Ankle  Pain Orientation: Right  Pain Descriptors: Tightness, Sore (peroneals and achilles)       Treatment Activities:  Exercises:      Treatment Reasoning    Exercise 4: seated heel and toe raises 2 x10 hold 5 sec  Exercise 9: PF GTB x 10 hold 5 sec  Exercise 10: DF GTB x 8 hold 5 sec  Exercise 11: INV x 10 RTB  Exercise 12: EV x 5 RTB inc pain inner ankle  Exercise 13: standing gastroc and soleus stretch hold 30 sec x 2 ea                          Manual Therapy: (CPT 11993) Treatment Reasoning     Soft Tissue Mobilizaton: STM to R gastroc and peroneals to dec tihgness with mod knots noted and inc tension along peroneals and achilles.  Other: KT tape to R gastroc muscle belly to met heads with 80% tension to dec tightness in calf, X over PF max tension and horizontal strip to R achilles max tension for pain control and dec tightness Limitations addressed: Tissue extensibility, Joint motion                    Modalities  Moist

## 2024-08-23 ENCOUNTER — TELEPHONE (OUTPATIENT)
Dept: ORTHOPEDIC SURGERY | Age: 49
End: 2024-08-23

## 2024-08-23 DIAGNOSIS — G56.02 CARPAL TUNNEL SYNDROME, LEFT: Primary | ICD-10-CM

## 2024-08-26 ENCOUNTER — HOSPITAL ENCOUNTER (OUTPATIENT)
Dept: PHYSICAL THERAPY | Age: 49
Setting detail: THERAPIES SERIES
Discharge: HOME | End: 2024-08-26
Payer: COMMERCIAL

## 2024-08-26 PROCEDURE — 97530 THERAPEUTIC ACTIVITIES: CPT

## 2024-08-26 PROCEDURE — 97140 MANUAL THERAPY 1/> REGIONS: CPT

## 2024-08-26 ASSESSMENT — PAIN DESCRIPTION - DESCRIPTORS: DESCRIPTORS: TIGHTNESS

## 2024-08-26 ASSESSMENT — PAIN DESCRIPTION - PAIN TYPE: TYPE: ACUTE PAIN

## 2024-08-26 ASSESSMENT — PAIN DESCRIPTION - ORIENTATION: ORIENTATION: RIGHT

## 2024-08-26 ASSESSMENT — PAIN SCALES - GENERAL: PAINLEVEL_OUTOF10: 6

## 2024-08-26 ASSESSMENT — PAIN DESCRIPTION - LOCATION: LOCATION: ANKLE

## 2024-08-26 NOTE — PROGRESS NOTES
Physical Therapy: Recheck Note   Patient: Dahlia DUVALL Workman (49 y.o. female)   Examination Date: 2024  Plan of Care/Certification Expiration Date: 24    Progress Note Counter: Pt getting twitching in the muscle in the right LE and spasming, the feeling right before it cramps up. S/S get worse when flexing her toes upward.     :  1975 # of Visits since SOC:   8   MRN: [id number]  CSN: [id number] Start of Care Date:   2024   Insurance: Payor: [employer]ARMESH / Plan: [employer] / Product Type: *No Product type* /   Insurance ID: [id number] - (Commercial) Secondary Insurance (if applicable): [facility]   Referring Physician: Luke Gamble PA Basinski   PCP: Carlos Devine MD Visits to Date/Visits Approved:     No Show/Cancelled Appts: 0 / 0     Medical Diagnosis: No admission diagnoses are documented for this encounter.    Treatment Diagnosis: Right ankle achilles tendinitis        SUBJECTIVE EXAMINATION   Pain Level: Pain Screening  Patient Currently in Pain: Yes  Pain Assessment: 0-10  Pain Level: 6  Pain Type: Acute pain  Pain Location: Ankle  Pain Orientation: Right  Pain Descriptors: Tightness    Patient Comments: Subjective: Pt reports having 5 1/2 ot 6/10 right ankle pain \" dull squeeze\"    HEP Compliance: Good        OBJECTIVE EXAMINATION   Restrictions:  Restrictions/Precautions: -- (Fell in October- in bedroom hit R knee,L wrist, R hip, walking from bed to doorway without AD)   No data recorded No data recorded        Left AROM  Right AROM        AROM RLE (degrees)  R Ankle Dorsiflexion (0-20): 0-5 degrees in supine (-10 degrees from neutral on eval)  R Ankle Plantar Flexion (0-45): 0-50 degrees  R Ankle Forefoot Inversion (0-40): 0-18 degrees       Left Strength  Right Strength              Strength RLE  R Knee Flexion: 4/5  R Knee Extension: 4/5  R Ankle Dorsiflexion: 4/5  R Ankle Plantar flexion: 4/5  R Ankle Inversion: 4-/5, 4/5  R Ankle Eversion: 4-/5, 4/5

## 2024-08-29 ENCOUNTER — HOSPITAL ENCOUNTER (OUTPATIENT)
Dept: PHYSICAL THERAPY | Age: 49
Setting detail: THERAPIES SERIES
Discharge: HOME | End: 2024-08-29
Payer: COMMERCIAL

## 2024-08-29 ENCOUNTER — HOSPITAL ENCOUNTER (OUTPATIENT)
Dept: OCCUPATIONAL THERAPY | Age: 49
Setting detail: THERAPIES SERIES
Discharge: HOME | End: 2024-08-29
Payer: COMMERCIAL

## 2024-08-29 PROCEDURE — 97110 THERAPEUTIC EXERCISES: CPT

## 2024-08-29 PROCEDURE — 97166 OT EVAL MOD COMPLEX 45 MIN: CPT

## 2024-08-29 PROCEDURE — 97035 APP MDLTY 1+ULTRASOUND EA 15: CPT

## 2024-08-29 PROCEDURE — 97140 MANUAL THERAPY 1/> REGIONS: CPT

## 2024-08-29 PROCEDURE — 97530 THERAPEUTIC ACTIVITIES: CPT

## 2024-08-29 ASSESSMENT — PAIN DESCRIPTION - ORIENTATION: ORIENTATION: RIGHT

## 2024-08-29 ASSESSMENT — 9 HOLE PEG TEST
TEST_RESULT: IMPAIRED
TESTTIME_SECONDS: 26.73
TESTTIME_SECONDS: 30.69
TEST_RESULT: IMPAIRED

## 2024-08-29 ASSESSMENT — PAIN DESCRIPTION - DESCRIPTORS: DESCRIPTORS: TIGHTNESS;ACHING

## 2024-08-29 ASSESSMENT — PAIN DESCRIPTION - LOCATION: LOCATION: ANKLE

## 2024-08-29 ASSESSMENT — PAIN DESCRIPTION - PAIN TYPE: TYPE: ACUTE PAIN

## 2024-08-29 ASSESSMENT — PAIN SCALES - GENERAL
PAINLEVEL_OUTOF10: 7
PAINLEVEL_OUTOF10: 8

## 2024-08-29 NOTE — PROGRESS NOTES
Occupational Therapy: Initial Evaluation   Patient: Dahlia DUVALL Workman (49 y.o. female)   Examination Date: 2024  Plan of Care Certification Period: 2024 to 24      :  1975  MRN: [id number]  CSN: [id number]   Insurance: Payor: [facility] / Plan: [facility] MEDICAID / Product Type: *No Product type* /   Insurance ID: [id number] - (Medicaid Managed) Secondary Insurance (if applicable):    Insurance Information:     Referring Physician: Luke Gamble PA [place]   PCP: Carlos Devine MD Visits to Date/Visits Approved:   1 / 10    No Show/Cancelled Appts:      Medical Diagnosis: Carpal tunnel syndrome, left [G56.02] L carpal tunnel  Treatment Diagnosis: Decreased IADL performance and QOL d/t L CTS         PERTINENT MEDICAL HISTORY   Patient assessed for rehabilitation services?: Yes  Self reported health status:: Fair    Medical History: Chart Reviewed: Yes   Past Medical History:   Diagnosis Date    Anxiety     Fibromyalgia 2023    GERD (gastroesophageal reflux disease) 2023    History of substance abuse (HCC) 10/10/2019    Perennial allergic rhinitis 10/10/2019    Primary osteoarthritis involving multiple joints 10/10/2019    Rupture of Achilles tendon 2018    Rupture of gastrocnemius muscle 2018     Surgical History:   Past Surgical History:   Procedure Laterality Date    TUBAL LIGATION         Medications:   Current Outpatient Medications:     omeprazole (PRILOSEC) 40 MG delayed release capsule, Take 1 capsule by mouth every morning (before breakfast), Disp: 90 capsule, Rfl: 1    montelukast (SINGULAIR) 10 MG tablet, Take 1 tablet by mouth nightly, Disp: 90 tablet, Rfl: 1    meloxicam (MOBIC) 15 MG tablet, Take 1 tablet by mouth daily, Disp: 90 tablet, Rfl: 1    levothyroxine (SYNTHROID) 88 MCG tablet, Take 1 tablet by mouth daily, Disp: 90 tablet, Rfl: 1    cyclobenzaprine (FLEXERIL) 10 MG tablet, Take 1 tablet by mouth 3 times daily as

## 2024-08-29 NOTE — PROGRESS NOTES
Physical Therapy: Daily Note   Patient: Dahlia DUVALL Workman (49 y.o. female)   Examination Date: 2024  Plan of Care/Certification Expiration Date: 24    Progress Note Counter: Pt getting twitching in the muscle in the right LE and spasming, the feeling right before it cramps up. S/S get worse when flexing her toes upward.     :  1975 # of Visits since SOC:   9   MRN: [id number]  CSN: [id number] Start of Care Date:   2024   Insurance: Payor: [facility] / Plan: [facility] MEDICAID / Product Type: *No Product type* /   Insurance ID: [id number] - (Medicaid Managed) Secondary Insurance (if applicable):    Referring Physician: Luke Gamble PA Basinski   PCP: Carlos Devine MD Visits to Date/Visits Approved:     No Show/Cancelled Appts: 0 / 0     Medical Diagnosis: No admission diagnoses are documented for this encounter.    Treatment Diagnosis: Right ankle achilles tendinitis        SUBJECTIVE EXAMINATION   Pain Level: Pain Screening  Patient Currently in Pain: Yes  Pain Assessment: 0-10  Pain Level: 7  Pain Type: Acute pain  Pain Location: Ankle  Pain Orientation: Right  Pain Descriptors: Tightness, Aching    Patient Comments: Subjective: 7/10 R ankle pain up into lateral RLE    HEP Compliance: Good     TREATMENT     Exercises:  Therapeutic exercise (CPT 19570)   Treatment Reasoning    Exercise 1: Long sit gastroc stretch with MHP in place 45 sec x 3 VC for inc hold time  Exercise 2: Long sit INV stretch with MHP hold  45 sec x 3  Exercise 3: Long sit EV stretch with MHP hold  45 sec x 3  Exercise 9: R ankle PF GTB x 10 hold x 5 reps  Exercise 10: R ankle DF GTB x 10 hold x 5 reps  Exercise 11: R ankle INV x 10 hold x 5 reps RTB  Exercise 12: R ankle EV 5 hold x x 6 reps RTB inc pain inner ankle, pt assisting to keep leg neutral mid lateral R calf to decreae use of hip  Exercise 14: longsit R hip int rot for stretch and lubricatin of joints- pt reports some dec in pain     10:24 AM   POC NOTE

## 2024-08-30 ENCOUNTER — APPOINTMENT (OUTPATIENT)
Dept: OCCUPATIONAL THERAPY | Age: 49
End: 2024-08-30
Payer: COMMERCIAL

## 2024-08-30 DIAGNOSIS — J45.40 MODERATE PERSISTENT ASTHMA WITHOUT COMPLICATION: Chronic | ICD-10-CM

## 2024-08-30 RX ORDER — ALBUTEROL SULFATE 90 UG/1
2 AEROSOL, METERED RESPIRATORY (INHALATION) EVERY 6 HOURS PRN
Qty: 8.5 EACH | Refills: 2 | Status: SHIPPED | OUTPATIENT
Start: 2024-08-30

## 2024-08-30 NOTE — TELEPHONE ENCOUNTER
Comments:     Last Office Visit (last PCP visit):   8/6/2024    Next Visit Date:  Future Appointments   Date Time Provider Department Center   8/30/2024  2:00 PM Em Horn OT [facility]  [facility]   9/4/2024 11:00 AM Rody Mendenhall, ODALYS [facility]   9/4/2024 12:00 PM Rody Aparicio, PT [facility]   9/10/2024 10:15 AM Em Horn OT [facility]   9/10/2024 11:00 AM Angi Montemayor, PTA [facility]   9/11/2024  1:30 PM Luke Gamble, NOEMI [facility]   9/13/2024 10:15 AM Em Horn OT [facility]   9/13/2024 11:00 AM Angi Montemayor, PTA [facility]       **If hasn't been seen in over a year OR hasn't followed up according to last diabetes/ADHD visit, make appointment for patient before sending refill to provider.    Rx requested:  Requested Prescriptions     Pending Prescriptions Disp Refills    albuterol sulfate HFA (PROVENTIL;VENTOLIN;PROAIR) 108 (90 Base) MCG/ACT inhaler [Pharmacy Med Name: ALBUTEROL HFA (PROAIR) INHALER] 8.5 each 2     Sig: INHALE 2 PUFFS INTO THE LUNGS EVERY 6 HOURS AS NEEDED FOR WHEEZING OR SHORTNESS OF BREATH

## 2024-09-04 ENCOUNTER — HOSPITAL ENCOUNTER (OUTPATIENT)
Dept: OCCUPATIONAL THERAPY | Age: 49
Setting detail: THERAPIES SERIES
Discharge: HOME | End: 2024-09-04
Payer: COMMERCIAL

## 2024-09-04 ENCOUNTER — HOSPITAL ENCOUNTER (OUTPATIENT)
Dept: PHYSICAL THERAPY | Age: 49
Setting detail: THERAPIES SERIES
Discharge: HOME | End: 2024-09-04
Payer: COMMERCIAL

## 2024-09-04 PROCEDURE — 97530 THERAPEUTIC ACTIVITIES: CPT

## 2024-09-04 PROCEDURE — 97140 MANUAL THERAPY 1/> REGIONS: CPT

## 2024-09-04 PROCEDURE — 97110 THERAPEUTIC EXERCISES: CPT

## 2024-09-04 PROCEDURE — 97035 APP MDLTY 1+ULTRASOUND EA 15: CPT

## 2024-09-04 ASSESSMENT — PAIN DESCRIPTION - DESCRIPTORS: DESCRIPTORS: SQUEEZING

## 2024-09-04 ASSESSMENT — PAIN DESCRIPTION - LOCATION: LOCATION: ANKLE

## 2024-09-04 ASSESSMENT — PAIN DESCRIPTION - ORIENTATION: ORIENTATION: RIGHT

## 2024-09-04 ASSESSMENT — PAIN SCALES - GENERAL: PAINLEVEL_OUTOF10: 7

## 2024-09-04 ASSESSMENT — PAIN DESCRIPTION - PAIN TYPE: TYPE: ACUTE PAIN

## 2024-09-04 NOTE — PROGRESS NOTES
Occupational Therapy  Daily Treatment Note  Date: 2024  Patient Name: Dahlia Myersman  :  1975  MRN: [id number]       Subjective   General  Diagnosis: L carpal tunnel  Referring Provider (secondary): Tenzin  OT Visit Information  Onset Date: 24  Total # of Visits Approved: 10  Total # of Visits to Date: 2  Certification Period Expiration Date: 24  No Show: 0  Progress Note Due Date: 24  Canceled Appointment: 0    Pt. Stated her right wrist is worse than her left.  Treatment Activities:        Paraffin on both hands and wrists to decrease stiffness and tightness  US- 3 MHZ, 50%, small head, 1.6 intensity on both thumb areas and wrists to decrease pain and swelling, for 15 minutes  Trained and educated pt. In radial and ulnar deviations on both hands 10 x holds of 5 seconds each to increase ROM and give a stretch  Trained and educated pt. In wrist flexion and extension 10 x holds of 5 seconds each to decrease stiffness and increase ROM  Trained and educated pt. In thumb stretches for adduction and abduction and flexion and extension 10 x each way holds of 5 seconds to decrease stiffness and increase ROM on both hands.  Pt. Demonstrated tendon and nerve glides with correct positioning 2 x through and holds of 3 seconds each to decrease stiffness and increase ROM  Measured, cut, and applied KT to D1 with 25% tension to give thumb a stretch, across both wrists with no tension for stability and to decrease pain, and a strip on D1 side on both with 10% tension to decrease pain,swelling, and give stability.  Remove before next visit to KT again next session.  Answered pt.'s questions and concerns.                                                                                                Assessment    OT follow up:yes  Pt. Reported she is here for both carpal tunnels. Pt. C/o of right wrist hurts more than the left. The right hand is weaker than the right as well. Pt. Tolerated paraffin and

## 2024-09-04 NOTE — PROGRESS NOTES
Physical Therapy: Daily Note   Patient: Dahlia DUVALL Workman (49 y.o. female)   Examination Date: 2024  Plan of Care/Certification Expiration Date: 24    Progress Note Counter: Pt getting twitching in the muscle in the right LE and spasming, the feeling right before it cramps up. S/S get worse when flexing her toes upward.     :  1975 # of Visits since SOC:   10   MRN: [id number]  CSN: [id number] Start of Care Date:   2024   Insurance: Payor: [facility] / Plan: [facility] MEDICAID / Product Type: *No Product type* /   Insurance ID: [id number] - (Medicaid Managed) Secondary Insurance (if applicable):    Referring Physician: Luke Gamble PA [place]   PCP: Carlos Devine MD Visits to Date/Visits Approved: 10 / 16    No Show/Cancelled Appts: 0 / 0     Medical Diagnosis: No admission diagnoses are documented for this encounter.    Treatment Diagnosis: Right ankle achilles tendinitis        SUBJECTIVE EXAMINATION   Pain Level: Pain Screening  Patient Currently in Pain: Yes  Pain Assessment: 0-10  Pain Level: 7  Pain Type: Acute pain  Pain Location: Ankle  Pain Orientation: Right  Pain Descriptors: Squeezing    Patient Comments: Subjective: 7/10 R ankle pain up into lateral/posterior  RLE    HEP Compliance: Good        OBJECTIVE EXAMINATION   Restrictions:  Restrictions/Precautions: -- (Fell in October- in bedroom hit R knee,L wrist, R hip, walking from bed to doorway without AD)   Required Braces or Orthoses?: Yes (Pt has B wrist braces)   No data recorded      TREATMENT     Exercises:      Treatment Reasoning    Exercise 7: Long Sit HS stretch R LE  Exercise 9: R ankle PF GTB x 10 hold x 5 reps  Exercise 10: R ankle DF GTB x 10 hold x 5 reps  Exercise 11: R ankle INV x 10 hold x 5 reps RTB  Exercise 12: R ankle EV 5 hold x 5 reps RTB                          Manual Therapy: (CPT 31630) Treatment Reasoning     Joint Mobilization: ankle jt mobs PA Mobs grade 2 ; PROM R ankle

## 2024-09-10 ENCOUNTER — HOSPITAL ENCOUNTER (OUTPATIENT)
Dept: PHYSICAL THERAPY | Age: 49
Setting detail: THERAPIES SERIES
Discharge: HOME | End: 2024-09-10
Payer: COMMERCIAL

## 2024-09-10 ENCOUNTER — HOSPITAL ENCOUNTER (OUTPATIENT)
Dept: OCCUPATIONAL THERAPY | Age: 49
Setting detail: THERAPIES SERIES
Discharge: HOME | End: 2024-09-10
Payer: COMMERCIAL

## 2024-09-10 PROCEDURE — 97140 MANUAL THERAPY 1/> REGIONS: CPT

## 2024-09-10 PROCEDURE — 97035 APP MDLTY 1+ULTRASOUND EA 15: CPT

## 2024-09-10 PROCEDURE — 97110 THERAPEUTIC EXERCISES: CPT

## 2024-09-10 ASSESSMENT — PAIN SCALES - GENERAL: PAINLEVEL_OUTOF10: 8

## 2024-09-13 ENCOUNTER — HOSPITAL ENCOUNTER (OUTPATIENT)
Dept: PHYSICAL THERAPY | Age: 49
Setting detail: THERAPIES SERIES
Discharge: HOME | End: 2024-09-13
Payer: COMMERCIAL

## 2024-09-13 ENCOUNTER — HOSPITAL ENCOUNTER (OUTPATIENT)
Dept: OCCUPATIONAL THERAPY | Age: 49
Setting detail: THERAPIES SERIES
Discharge: HOME | End: 2024-09-13
Payer: COMMERCIAL

## 2024-09-13 PROCEDURE — 97140 MANUAL THERAPY 1/> REGIONS: CPT

## 2024-09-13 PROCEDURE — 97110 THERAPEUTIC EXERCISES: CPT

## 2024-09-13 PROCEDURE — 97035 APP MDLTY 1+ULTRASOUND EA 15: CPT

## 2024-09-13 ASSESSMENT — PAIN SCALES - GENERAL: PAINLEVEL_OUTOF10: 7

## 2024-09-17 ENCOUNTER — HOSPITAL ENCOUNTER (OUTPATIENT)
Dept: OCCUPATIONAL THERAPY | Age: 49
Setting detail: THERAPIES SERIES
Discharge: HOME | End: 2024-09-17
Payer: COMMERCIAL

## 2024-09-17 PROCEDURE — 97530 THERAPEUTIC ACTIVITIES: CPT

## 2024-09-17 PROCEDURE — 97035 APP MDLTY 1+ULTRASOUND EA 15: CPT

## 2024-09-20 ENCOUNTER — HOSPITAL ENCOUNTER (OUTPATIENT)
Dept: OCCUPATIONAL THERAPY | Age: 49
Setting detail: THERAPIES SERIES
Discharge: HOME | End: 2024-09-20
Payer: COMMERCIAL

## 2024-09-20 PROCEDURE — 97530 THERAPEUTIC ACTIVITIES: CPT

## 2024-09-20 PROCEDURE — 97035 APP MDLTY 1+ULTRASOUND EA 15: CPT

## 2024-09-20 PROCEDURE — 97110 THERAPEUTIC EXERCISES: CPT

## 2024-09-24 ENCOUNTER — HOSPITAL ENCOUNTER (OUTPATIENT)
Dept: OCCUPATIONAL THERAPY | Age: 49
Setting detail: THERAPIES SERIES
Discharge: HOME | End: 2024-09-24
Payer: COMMERCIAL

## 2024-09-24 PROCEDURE — 97530 THERAPEUTIC ACTIVITIES: CPT

## 2024-09-24 PROCEDURE — 97110 THERAPEUTIC EXERCISES: CPT

## 2024-09-24 PROCEDURE — 97035 APP MDLTY 1+ULTRASOUND EA 15: CPT

## 2024-09-25 ENCOUNTER — OFFICE VISIT (OUTPATIENT)
Dept: ORTHOPEDIC SURGERY | Age: 49
End: 2024-09-25

## 2024-09-25 VITALS
OXYGEN SATURATION: 100 % | WEIGHT: 293 LBS | TEMPERATURE: 97.7 F | HEART RATE: 84 BPM | BODY MASS INDEX: 48.82 KG/M2 | HEIGHT: 65 IN

## 2024-09-25 DIAGNOSIS — G56.03 CARPAL TUNNEL SYNDROME, BILATERAL: Primary | ICD-10-CM

## 2024-09-25 ASSESSMENT — ENCOUNTER SYMPTOMS
RESPIRATORY NEGATIVE: 1
EYES NEGATIVE: 1
GASTROINTESTINAL NEGATIVE: 1

## 2024-09-27 ENCOUNTER — HOSPITAL ENCOUNTER (OUTPATIENT)
Dept: PHYSICAL THERAPY | Age: 49
Setting detail: THERAPIES SERIES
Discharge: HOME | End: 2024-09-27
Payer: COMMERCIAL

## 2024-09-27 ENCOUNTER — HOSPITAL ENCOUNTER (OUTPATIENT)
Dept: OCCUPATIONAL THERAPY | Age: 49
Setting detail: THERAPIES SERIES
Discharge: HOME | End: 2024-09-27
Payer: COMMERCIAL

## 2024-09-27 PROCEDURE — 97110 THERAPEUTIC EXERCISES: CPT

## 2024-09-27 PROCEDURE — 97535 SELF CARE MNGMENT TRAINING: CPT

## 2024-09-27 PROCEDURE — 97140 MANUAL THERAPY 1/> REGIONS: CPT

## 2024-09-27 ASSESSMENT — 9 HOLE PEG TEST
TESTTIME_SECONDS: 23.23
TESTTIME_SECONDS: 20.77
TEST_RESULT: IMPAIRED
TEST_RESULT: IMPAIRED

## 2024-09-27 ASSESSMENT — PAIN SCALES - GENERAL
PAINLEVEL_OUTOF10: 6
PAINLEVEL_OUTOF10: 7

## 2024-09-27 ASSESSMENT — PAIN DESCRIPTION - DESCRIPTORS: DESCRIPTORS: DULL;PRESSURE

## 2024-09-27 ASSESSMENT — PAIN DESCRIPTION - PAIN TYPE: TYPE: ACUTE PAIN

## 2024-09-27 ASSESSMENT — PAIN DESCRIPTION - ORIENTATION: ORIENTATION: RIGHT

## 2024-09-27 ASSESSMENT — PAIN DESCRIPTION - LOCATION: LOCATION: ANKLE

## 2024-10-01 ENCOUNTER — HOSPITAL ENCOUNTER (OUTPATIENT)
Dept: PHYSICAL THERAPY | Age: 49
Setting detail: THERAPIES SERIES
Discharge: HOME | End: 2024-10-01
Payer: COMMERCIAL

## 2024-10-01 ENCOUNTER — HOSPITAL ENCOUNTER (OUTPATIENT)
Dept: OCCUPATIONAL THERAPY | Age: 49
Setting detail: THERAPIES SERIES
Discharge: HOME | End: 2024-10-01
Payer: COMMERCIAL

## 2024-10-01 PROCEDURE — 97110 THERAPEUTIC EXERCISES: CPT

## 2024-10-01 PROCEDURE — 97530 THERAPEUTIC ACTIVITIES: CPT

## 2024-10-01 PROCEDURE — 97140 MANUAL THERAPY 1/> REGIONS: CPT

## 2024-10-01 PROCEDURE — 97035 APP MDLTY 1+ULTRASOUND EA 15: CPT

## 2024-10-01 PROCEDURE — 97032 APPL MODALITY 1+ESTIM EA 15: CPT

## 2024-10-01 NOTE — PROGRESS NOTES
Occupational Therapy  Daily Treatment Note  Date: 10/1/2024  Patient Name: Dahlia Quinn  :  1975  MRN: [id number]       Subjective   General  Referring Provider (secondary): Tenzin  OT Visit Information  Onset Date: 24  Total # of Visits Approved: 25  Total # of Visits to Date:  (9 from first visit, but 2nd from re-check)  Certification Period Expiration Date: 10/25/24  No Show: 0  Progress Note Due Date: 10/25/24  Canceled Appointment: 0    Pt. Stated she is in 6-7/10 pain in both wrists.  Treatment Activities:        Paraffin on both hands and wrists to decrease stiffness and pain.  US- 3 MHZ, 50%, small head, 2.0 intensity on both D1 areas and wrists to decrease pain and swelling, for 15 minutes for both- on new US with added heat, pt. Reported it felt good  PROM of all digits and wrist in flexion and extension 10 reps each holds of 5 seconds to decrease stiffness and increase ROM  Gave recommendations on compensation techniques for home for tyrone and for everyday tasks.  Recommended ice and elevate to decrease swelling and pain.  Tens unit on both wrists to decrease pain settings on 2.0 on right and 2.5 on left with 2 electrodes on each wrist. (15 minutes)  Retro- grade massage deep pressure to break up tissue and increase blood flow in wrists.  Measured, cut, and applied KT to D5 with 25% tension to give a stretch, long ways on both wrists with no tension for stability and to decrease pain, and a strip across wrists with no tension to decrease pain,swelling, and give stability on both wrists. Remove in 5 days.  Answered pt.'s questions and concerns.                                                                                              Assessment    OT follow up:yes  Pt. Had re-check last visit and has met all goals except to decrease pain. Pt. Tolerated paraffin, US with heat, tens unit, retrograde massage, and KT this visit to try to decrease pain and swelling. Pt. Brought her braces in

## 2024-10-01 NOTE — PROGRESS NOTES
Physical Therapy: Monthly Recheck   Patient: Dahlia Myersman (49 y.o. female)   Examination Date: 10/01/2024  Plan of Care/Certification Expiration Date: 24    Progress Note Counter: Pt getting twitching in the muscle in the right LE and spasming, the feeling right before it cramps up. S/S get worse when flexing her toes upward.     :  1975 # of Visits since SOC:   14   MRN: [id number]  CSN: [id number] Start of Care Date:   2024   Insurance: Payor: [facility] / Plan: [facility] MEDICAID / Product Type: *No Product type* /   Insurance ID: [id number] - (Medicaid Managed) Secondary Insurance (if applicable):    Referring Physician: Luke Gamble PA [place]   PCP: Carlos Devine MD Visits to Date/Visits Approved:     No Show/Cancelled Appts: 0 / 0     Medical Diagnosis: No admission diagnoses are documented for this encounter. Right foot achilles tendintis  Treatment Diagnosis: Right ankle achilles tendinitis        SUBJECTIVE EXAMINATION   Pain Level:  5/10 ankle pain    Patient Comments: Subjective: Pt reports 6.5/10 walking ave 7-8/10 ankle pain with walking, 4-5/10 at rest sitting, using crutches in the house and rollator outside of house, overall some improvement since initiating therapy    HEP Compliance: Good        OBJECTIVE EXAMINATION   Restrictions:  Restrictions/Precautions: -- (Fell in October- in bedroom hit R knee,L wrist, R hip, walking from bed to doorway without AD)    Ambulation/Gait (if applicable):   Ambulation  Surface: Level tile  Device: Rollator  Assistance: Modified Independent  Quality of Gait: slow pace, wide ALIVIA, short step length improved heel strike and improved upright posture with exception of last 50 feet slight flexed posture due to increased back pain and fatigue- able to keep hands on rollator at all times (pt has history of leaning on rollator with forearms due to increased back pain with upright posture) Iniital ankle pain 6/10

## 2024-10-04 ENCOUNTER — HOSPITAL ENCOUNTER (OUTPATIENT)
Dept: OCCUPATIONAL THERAPY | Age: 49
Setting detail: THERAPIES SERIES
Discharge: HOME | End: 2024-10-04
Payer: COMMERCIAL

## 2024-10-04 ENCOUNTER — HOSPITAL ENCOUNTER (OUTPATIENT)
Dept: PHYSICAL THERAPY | Age: 49
Setting detail: THERAPIES SERIES
Discharge: HOME | End: 2024-10-04
Payer: COMMERCIAL

## 2024-10-04 PROCEDURE — 97035 APP MDLTY 1+ULTRASOUND EA 15: CPT

## 2024-10-04 PROCEDURE — 97032 APPL MODALITY 1+ESTIM EA 15: CPT

## 2024-10-04 PROCEDURE — 97110 THERAPEUTIC EXERCISES: CPT

## 2024-10-04 PROCEDURE — 97140 MANUAL THERAPY 1/> REGIONS: CPT

## 2024-10-04 PROCEDURE — 97530 THERAPEUTIC ACTIVITIES: CPT

## 2024-10-04 ASSESSMENT — PAIN DESCRIPTION - PAIN TYPE: TYPE: ACUTE PAIN

## 2024-10-04 ASSESSMENT — PAIN DESCRIPTION - LOCATION: LOCATION: ANKLE

## 2024-10-04 ASSESSMENT — PAIN DESCRIPTION - DESCRIPTORS: DESCRIPTORS: THROBBING

## 2024-10-04 ASSESSMENT — PAIN DESCRIPTION - ORIENTATION: ORIENTATION: RIGHT

## 2024-10-04 ASSESSMENT — PAIN SCALES - GENERAL: PAINLEVEL_OUTOF10: 7

## 2024-10-04 NOTE — PROGRESS NOTES
Physical Therapy: Daily Note   Patient: Dahlia DUVALL Workman (49 y.o. female)   Examination Date: 10/04/2024  Plan of Care/Certification Expiration Date: 24    Progress Note Counter: Pt getting twitching in the muscle in the right LE and spasming, the feeling right before it cramps up. S/S get worse when flexing her toes upward.     :  1975 # of Visits since SOC:   15   MRN: [id number]  CSN: [id number] Start of Care Date:   2024   Insurance: Payor: [facility] / Plan: [facility] MEDICAID / Product Type: *No Product type* /   Insurance ID: [id number] - (Medicaid Managed) Secondary Insurance (if applicable):    Referring Physician: Luke Gamble PA [place]   PCP: Carlos Devine MD Visits to Date/Visits Approved: 15 / 24    No Show/Cancelled Appts: 0 / 0     Medical Diagnosis: No admission diagnoses are documented for this encounter.    Treatment Diagnosis: Right ankle achilles tendinitis        SUBJECTIVE EXAMINATION   Pain Level: Pain Screening  Patient Currently in Pain: Yes  Pain Assessment: 0-10  Pain Level: 7  Pain Type: Acute pain  Pain Location: Ankle  Pain Orientation: Right  Pain Descriptors: Throbbing    Patient Comments: Subjective: Pt states 4.5-5/10 R achillies pain at rest and 7/10 with ambulation. Pt states taking 2 different pain meds for morning and night.    HEP Compliance: Good  Any changes to allergies, medications, or have you had any medical procedures/ER visits since your last visit?: No     OBJECTIVE EXAMINATION   Restrictions:  Restrictions/Precautions: -- (Fell in October- in bedroom hit R knee,L wrist, R hip, walking from bed to doorway without AD)   Required Braces or Orthoses?: Yes (B wrist braces, new pair provided by ortho this week)   No data recorded          TREATMENT     Exercises:      Treatment Reasoning    Exercise 1: long sit gastroc stretch with sheet x3, 30 sec h  Exercise 2: AROM ankle all planes, cw, ccw x 10-15 each way  Exercise 9: GTB

## 2024-10-04 NOTE — PROGRESS NOTES
Occupational Therapy  Daily Treatment Note  Date: 10/4/2024  Patient Name: Dahlia Quinn  :  1975  MRN: [id number]       Subjective   General  Diagnosis: B carpal tunnel  Referring Provider (secondary): Tenzin  OT Visit Information  Onset Date: 24  Total # of Visits Approved: 25  Total # of Visits to Date:  (10 visits total, visit #3 from re-check)  Certification Period Expiration Date: 10/25/24  No Show: 0  Progress Note Due Date: 10/25/24  Canceled Appointment: 0    Pt. Stated she is in 6/10 pain in both wrists.  Treatment Activities:           Paraffin on both hands and wrists to decrease stiffness and pain.  US- 3 MHZ, 50%, small head, 1.7 intensity on both D1 areas and wrists to decrease pain and swelling, for 15 minutes for both- on new US with added heat, pt. Reported it felt good  PROM of all digits and wrist in flexion and extension 10 reps each holds of 5 seconds to decrease stiffness and increase ROM  Recommended ice and elevate to decrease swelling and pain at home.  ESTIM [place] on both wrists to decrease pain settings on intensity 5 on right and intensity 5 on left with 2 electrodes on each wrist, continuous,  (15 minutes), 50 bps, 2 seconds to decrease pain and swelling  Retro- grade massage deep pressure to break up tissue and increase blood flow in wrists.  Measured, cut, and applied KT to D5 with 25% tension to give a stretch, long ways on both wrists with no tension for stability and to decrease pain, and a strip across wrists with no tension to decrease pain,swelling, and give stability on both wrists. Remove in 5 days.  Answered pt.'s questions and concerns.                                                                                               Assessment    OT follow up:yes      Pt. Reported 6/10 pain in both wrists. This OT session was primary focused on decreasing pain and swelling in both wrists. Pt. Tolerated new US well with increased intensity to 1.7 this day. Pt.

## 2024-10-08 ENCOUNTER — HOSPITAL ENCOUNTER (OUTPATIENT)
Dept: OCCUPATIONAL THERAPY | Age: 49
Setting detail: THERAPIES SERIES
Discharge: HOME | End: 2024-10-08
Payer: COMMERCIAL

## 2024-10-08 ENCOUNTER — HOSPITAL ENCOUNTER (OUTPATIENT)
Dept: PHYSICAL THERAPY | Age: 49
Setting detail: THERAPIES SERIES
Discharge: HOME | End: 2024-10-08
Payer: COMMERCIAL

## 2024-10-08 PROCEDURE — 97110 THERAPEUTIC EXERCISES: CPT

## 2024-10-08 PROCEDURE — 97140 MANUAL THERAPY 1/> REGIONS: CPT

## 2024-10-08 PROCEDURE — 97035 APP MDLTY 1+ULTRASOUND EA 15: CPT

## 2024-10-08 PROCEDURE — 97530 THERAPEUTIC ACTIVITIES: CPT

## 2024-10-08 PROCEDURE — 97032 APPL MODALITY 1+ESTIM EA 15: CPT

## 2024-10-08 ASSESSMENT — PAIN SCALES - GENERAL: PAINLEVEL_OUTOF10: 7

## 2024-10-08 ASSESSMENT — PAIN DESCRIPTION - DESCRIPTORS: DESCRIPTORS: THROBBING

## 2024-10-08 ASSESSMENT — PAIN DESCRIPTION - ORIENTATION: ORIENTATION: RIGHT

## 2024-10-08 ASSESSMENT — PAIN DESCRIPTION - LOCATION: LOCATION: ANKLE

## 2024-10-08 ASSESSMENT — PAIN DESCRIPTION - PAIN TYPE: TYPE: ACUTE PAIN

## 2024-10-08 NOTE — PROGRESS NOTES
Mod LEFS from 64% disability score to <25% by time of discharge Recheck 10/01/24: Mod LEFS= 69% Not Met   Pt indep with an advanced HEP to progress with her mobility and decrease her pain Progressing HEP as tolerated In progress                                                TREATMENT PLAN   Plan Frequency: 1-2  Plan weeks: total of 24 visits approved (10 visits remain as of recheck on 10/01/24)  Current Treatment Recommendations: Strengthening, ROM, Balance training, Functional mobility training, Neuromuscular re-education, Gait training, Endurance training, Manual, Pain management, Home exercise program, Modalities, Therapeutic activities  Modalities: Heat/Cold, Ultrasound, E-stim - unattended   Additional Comments: KT tape       Therapy Time  Individual Time In:   1100      Individual Time Out:  1145  Minutes:  45        Electronically signed by Angi Montemayor PTA  on 10/8/2024 at 3:45 PM   POC NOTE

## 2024-10-08 NOTE — PROGRESS NOTES
doing all her stretches and exercises at home everyday.Pt. Tolerated new machine of US well with increased intensity to 1.8 this day with heat. Pt. Tolerated paraffin well, ESTIM with settings listed above, and KT. Remove in 5 days. Pt. Was instructed to continue stretches and exercises at home everyday. Recommended ice and elevate this day. Remove KT in 5 days. Pt. Reported she still wears both braces at night time for comfort and stability.  Answered pt.'s questions and concerns.               Plan         Pt. and/or family actively involved in establishing Plan of Care and Goals: Yes   Patient/ Caregiver education and instruction: OT Role, Plan of Care, Lifting mechanics, Home Exercise Program, Posture, Anatomy of condition, Energy Conservation, IADL Safety, Body mechanics, Equipment, Evaluative findings, Goal setting          Treatment may include any combination of the following: Current Treatment Recommendations: Strengthening, ROM, Balance training, Safety education & training, Functional mobility training, Patient/Caregiver education & training, Pain management, Self-Care / ADL, Positioning, Modalities, Equipment evaluation, education, & procurement, Coordination training, Neuromuscular re-education, Endurance training      Frequency / Duration:  Patient to be seen 2x week for 4 weeks                                                        Goals   Short Term Goals  Time Frame for Short Term Goals: 1-3 weeks from OT eval  Short Term Goal 1: Improve pain levels to <6/10 B wrist to maximize engagement in desired IADL activity  STG Goal 1 Status:: Met  Short Term Goal 2: Improve hamilton and pincer grasp by 2# to be able to hold coffee cup SBA  STG Goal 2 Status:: Met  Short Term Goal 3: Increase B wrist flexion/extension by 3 degrees to hold video game controller SBA  STG Goal 3 Status:: Met  Short Term Goal 4: Demo BUE HEP with min vc  STG Goal 4 Status:: Met  Short Term Goal 5: Tolerate modalities such as

## 2024-10-10 DIAGNOSIS — M62.830 BACK MUSCLE SPASM: ICD-10-CM

## 2024-10-10 RX ORDER — CYCLOBENZAPRINE HCL 10 MG
TABLET ORAL
Qty: 90 TABLET | Refills: 1 | Status: SHIPPED | OUTPATIENT
Start: 2024-10-10

## 2024-10-10 NOTE — TELEPHONE ENCOUNTER
Pharmacy is requesting medication refill. Please approve or deny this request.    Rx requested:  Requested Prescriptions     Pending Prescriptions Disp Refills    cyclobenzaprine (FLEXERIL) 10 MG tablet [Pharmacy Med Name: CYCLOBENZAPRINE 10 MG TABLET] 90 tablet 1     Sig: TAKE 1 TABLET BY MOUTH THREE TIMES A DAY AS NEEDED FOR MUSCLE SPASM         Last Office Visit:   8/6/2024      Next Visit Date:  Future Appointments   Date Time Provider Department Center   10/11/2024 10:15 AM Em Horn OT [facility]  OT [facility] [place]   10/11/2024 11:00 AM Angi Montemayor PTA [facility]  PT [facility] [place]   10/15/2024  2:15 PM Angi Montemayor PTA [facility]  PT [facility] [place]   10/18/2024  3:00 PM Angi Montemayor PTA [facility]  PT [facility] [place]

## 2024-10-11 ENCOUNTER — HOSPITAL ENCOUNTER (OUTPATIENT)
Dept: PHYSICAL THERAPY | Age: 49
Setting detail: THERAPIES SERIES
Discharge: HOME | End: 2024-10-11
Payer: COMMERCIAL

## 2024-10-11 ENCOUNTER — HOSPITAL ENCOUNTER (OUTPATIENT)
Dept: OCCUPATIONAL THERAPY | Age: 49
Setting detail: THERAPIES SERIES
Discharge: HOME | End: 2024-10-11
Payer: COMMERCIAL

## 2024-10-11 PROCEDURE — 97140 MANUAL THERAPY 1/> REGIONS: CPT

## 2024-10-11 PROCEDURE — 97035 APP MDLTY 1+ULTRASOUND EA 15: CPT

## 2024-10-11 PROCEDURE — 97110 THERAPEUTIC EXERCISES: CPT

## 2024-10-11 ASSESSMENT — PAIN SCALES - GENERAL: PAINLEVEL_OUTOF10: 7

## 2024-10-11 ASSESSMENT — 9 HOLE PEG TEST
TESTTIME_SECONDS: 23.31
TEST_RESULT: IMPAIRED
TEST_RESULT: IMPAIRED
TESTTIME_SECONDS: 24.65

## 2024-10-11 ASSESSMENT — PAIN DESCRIPTION - ORIENTATION: ORIENTATION: RIGHT

## 2024-10-11 ASSESSMENT — PAIN DESCRIPTION - PAIN TYPE: TYPE: ACUTE PAIN

## 2024-10-11 ASSESSMENT — PAIN DESCRIPTION - DESCRIPTORS: DESCRIPTORS: TIGHTNESS;THROBBING

## 2024-10-11 ASSESSMENT — PAIN DESCRIPTION - LOCATION: LOCATION: ANKLE

## 2024-10-11 NOTE — PROGRESS NOTES
Completed:  Exercises: Therapeutic exercise (CPT 13546)   Treatment Reasoning    OT Exercise 1: Reviewed HEP with pt who reports she uses the putty every morning to help with stiffness. Pt completes wrist hand finger stretches throughout the day. Educated pt on doorway stretch, pec stretch, and ER for nerve pressure relief on brachial plexus.    Limitations addressed: Mobility, Strength, Coordination, Flexibility, Activity tolerance, Pain modulation  Therapist provided: Assistance, Verbal cuing  Progressed: Resistance, Repetitions, Complexity of movement  Functional ability(s) targeted: Tolerance to age appropriate activities   1:1 Time (minutes): 30                 Manual Therapy: (CPT 86494) Treatment Reasoning     Other: Functional and objective measurements taken for OT recheck and d/c. Limitations addressed: Joint motion, Tissue extensibility, Painful spasm, Edema  Therapist provided: Assistance, Verbal cuing  Progressed: Complexity of movement  Functional ability(s) targeted: Tolerance to age appropriate activities   1:1 Time (minutes): 15                Treatment Summary:   Treatment Summary  1:1 Time : 45    Therapy Time  Individual Time In:  10:15am  Individual Time Out:  11:00am  Minutes:  45        Therapist Signature: Em Horn, [id number]    Date: 10/11/2024     I certify that the above Occupational Therapy Services are being furnished while the patient is under my care. I agree with the treatment plan and certify that this therapy is necessary.      Physician's Signature:  ___________________________   Date:_______                                                                   Luke Gamble PA        Physician Comments: _______________________________________________    Please sign and return to [facility] OCCUPATION THERAPY.  Please fax to the location listed below. THANK YOU for this referral!    [facility] OCCUPATION THERAPY  [address] [facility] [address]  Dept:

## 2024-10-11 NOTE — PROGRESS NOTES
Physical Therapy  Physical Therapy: Daily Note   Patient: Dahlia DUVALL Workman (49 y.o. female)   Examination Date: 10/11/2024  Plan of Care/Certification Expiration Date: 24    Progress Note Counter: Pt getting twitching in the muscle in the right LE and spasming, the feeling right before it cramps up. S/S get worse when flexing her toes upward.     :  1975 # of Visits since SOC:   17   MRN: [id number]  CSN: [id number] Start of Care Date:   2024   Insurance: Payor: [facility] / Plan: [facility] MEDICAID / Product Type: *No Product type* /   Insurance ID: [id number] - (Medicaid Managed) Secondary Insurance (if applicable):    Referring Physician: Luke Gamble PA [place]   PCP: Carlos Devine MD Visits to Date/Visits Approved:      No Show/Cancelled Appts:  0 /  0     Medical Diagnosis: No admission diagnoses are documented for this encounter.    Treatment Diagnosis: Right ankle achilles tendinitis        SUBJECTIVE EXAMINATION   Pain Level: Pain Screening  Patient Currently in Pain: Yes  Pain Assessment: 0-10  Pain Level:  (6-7/10)  Pain Type: Acute pain  Pain Location: Ankle (Heel)  Pain Orientation: Right  Pain Descriptors: Tightness, Throbbing    Patient Comments: Subjective: Pt. states the cold weather affects her and pain 6-7/10 tightness and inc pain WB'ing.    HEP Compliance: Good        OBJECTIVE EXAMINATION   Restrictions:  Restrictions/Precautions: -- (Fell in October- in bedroom hit R knee,L wrist, R hip, walking from bed to doorway without AD)   Required Braces or Orthoses?: Yes (B wrist braces for night)   No data recorded              TREATMENT     Exercises:      Treatment Reasoning    Exercise 1: long sit gastroc stretch with sheet x3, 30 sec h  Exercise 2: long sit soleus stretch with sheet 30 sec x 3  Exercise 13: standing gastroc stretch hold 30 sec x 2 ea  Exercise 14: standing soleus stretch hold 30 sec x 2  Exercise 15: *Standing heel/toe raises x 10 B

## 2024-10-15 ENCOUNTER — HOSPITAL ENCOUNTER (OUTPATIENT)
Dept: PHYSICAL THERAPY | Age: 49
Setting detail: THERAPIES SERIES
Discharge: HOME | End: 2024-10-15
Payer: COMMERCIAL

## 2024-10-15 NOTE — PROGRESS NOTES
Physical Therapy  Physical Therapy: Daily Note   Patient: Dahlia DUVALL Workman (49 y.o. female)   Examination Date: 10/15/2024  Plan of Care/Certification Expiration Date: 24    Progress Note Counter: Pt getting twitching in the muscle in the right LE and spasming, the feeling right before it cramps up. S/S get worse when flexing her toes upward.     :  1975 # of Visits since SOC:   18   MRN: [id number]  CSN: [id number] Start of Care Date:   2024   Insurance: Payor: [facility] / Plan: [facility] MEDICAID / Product Type: *No Product type* /   Insurance ID: [id number] - (Medicaid Managed) Secondary Insurance (if applicable):    Referring Physician: Luke Gamble PA [place]   PCP: Carlos Devine MD Visits to Date/Visits Approved:     No Show/Cancelled Appts: 0      Medical Diagnosis: No admission diagnoses are documented for this encounter.    Treatment Diagnosis: Right ankle achilles tendinitis            Patient Comments: Subjective: Pt. cancelled due to the flu.         ASSESSMENT     Assessment:    Body Structures, Functions, Activity Limitations Requiring Skilled Therapeutic Intervention: Decreased functional mobility , Decreased ROM, Decreased strength, Decreased endurance, Decreased balance, Increased pain    Post-Treatment Pain Level:      Activity Tolerance: Patient limited by pain    Therapy Prognosis: Good       GOALS   Patient Goals : To get rid of her right ankle pain and be able to walk like normal again  Short Term Goals Completed by 1-2 weeks Current Status Goal Status   Pt reports able to complete her HEP 3-5x per week Pt reports that she exercises approx 5 days per wk Met   Pt reports having 4/10 or less right achilles/right ankle pain in am Pt reports 8/10 in am but 5 1/2 to 6/10 after stretching Not Met                                                                       Long Term Goals Completed by 8-10 weeks changed at recheck 24 Current Status

## 2024-10-18 ENCOUNTER — HOSPITAL ENCOUNTER (OUTPATIENT)
Dept: PHYSICAL THERAPY | Age: 49
Setting detail: THERAPIES SERIES
Discharge: HOME | End: 2024-10-18
Payer: COMMERCIAL

## 2024-10-18 NOTE — PROGRESS NOTES
Physical Therapy  Physical Therapy: Daily Note   Patient: Dahlia DUVALL Workman (49 y.o. female)   Examination Date: 10/18/2024  Plan of Care/Certification Expiration Date: 24    Progress Note Counter: Pt getting twitching in the muscle in the right LE and spasming, the feeling right before it cramps up. S/S get worse when flexing her toes upward.     :  1975 # of Visits since SOC:   18   MRN: [id number]  CSN: [id number] Start of Care Date:   2024   Insurance: Payor: [facility] / Plan: [facility] MEDICAID / Product Type: *No Product type* /   Insurance ID: [id number] - (Medicaid Managed) Secondary Insurance (if applicable):    Referring Physician: Luke Gamble PA     PCP: Carlos Devine MD Visits to Date/Visits Approved:      No Show/Cancelled Appts: 0  /   2    Medical Diagnosis: No admission diagnoses are documented for this encounter.    Treatment Diagnosis: Right ankle achilles tendinitis        Pt. Cancelled ill.              Therapy Time  Individual Time In:         Individual Time Out:    Minutes:  0        Electronically signed by Angi Montemayor PTA  on 10/18/2024 at 1:22 PM   POC NOTE

## 2024-10-22 ENCOUNTER — HOSPITAL ENCOUNTER (OUTPATIENT)
Dept: PHYSICAL THERAPY | Age: 49
Setting detail: THERAPIES SERIES
Discharge: HOME | End: 2024-10-22
Payer: COMMERCIAL

## 2024-10-22 PROCEDURE — 97035 APP MDLTY 1+ULTRASOUND EA 15: CPT

## 2024-10-22 PROCEDURE — 97110 THERAPEUTIC EXERCISES: CPT

## 2024-10-22 PROCEDURE — 97140 MANUAL THERAPY 1/> REGIONS: CPT

## 2024-10-22 ASSESSMENT — PAIN DESCRIPTION - ORIENTATION: ORIENTATION: RIGHT

## 2024-10-22 ASSESSMENT — PAIN SCALES - GENERAL: PAINLEVEL_OUTOF10: 5

## 2024-10-22 ASSESSMENT — PAIN DESCRIPTION - PAIN TYPE: TYPE: ACUTE PAIN

## 2024-10-22 ASSESSMENT — PAIN DESCRIPTION - DESCRIPTORS: DESCRIPTORS: TIGHTNESS

## 2024-10-22 ASSESSMENT — PAIN DESCRIPTION - LOCATION: LOCATION: ANKLE

## 2024-10-22 NOTE — PROGRESS NOTES
Physical Therapy  Physical Therapy: Daily Note   Patient: Dahlia DUVALL Workman (49 y.o. female)   Examination Date: 10/22/2024  Plan of Care/Certification Expiration Date: 24    Progress Note Counter: Pt getting twitching in the muscle in the right LE and spasming, the feeling right before it cramps up. S/S get worse when flexing her toes upward.     :  1975 # of Visits since SOC:   18   MRN: [id number]  CSN: [id number] Start of Care Date:   2024   Insurance: Payor: [facility] / Plan: [facility] MEDICAID / Product Type: *No Product type* /   Insurance ID: [id number] - (Medicaid Managed) Secondary Insurance (if applicable):    Referring Physician: Luke Gamble PA [place]   PCP: Carlos Devine MD Visits to Date/Visits Approved:     No Show/Cancelled Appts: 0 / 1     Medical Diagnosis: No admission diagnoses are documented for this encounter.    Treatment Diagnosis: Right ankle achilles tendinitis        SUBJECTIVE EXAMINATION   Pain Level: Pain Screening  Patient Currently in Pain: Yes  Pain Level: 5  Pain Type: Acute pain  Pain Location: Ankle (heel)  Pain Orientation: Right  Pain Descriptors: Tightness    Patient Comments: Subjective: Pt. states her pain is 5/10 currently.  \"Not as bad in the afternoons.\"    HEP Compliance: Good  Any changes to allergies, medications, or have you had any medical procedures/ER visits since your last visit?: No     OBJECTIVE EXAMINATION   Restrictions:  Restrictions/Precautions: -- (Fell in October- in bedroom hit R knee,L wrist, R hip, walking from bed to doorway without AD)   Required Braces or Orthoses?: Yes (B wrist braces for night)   No data recorded              TREATMENT     Exercises:      Treatment Reasoning    Exercise 1: long sit gastroc stretch with sheet x3, 30 sec h  Exercise 2: long sit soleus stretch with sheet 30 sec x 3  Exercise 3: Long sit EV stretch with MHP hold  45 sec x 3  Exercise 10: R ankle DF GTB x 10 hold x 5

## 2024-10-24 ENCOUNTER — HOSPITAL ENCOUNTER (OUTPATIENT)
Dept: PHYSICAL THERAPY | Age: 49
Setting detail: THERAPIES SERIES
Discharge: HOME | End: 2024-10-24
Payer: COMMERCIAL

## 2024-10-24 NOTE — PROGRESS NOTES
Physical Therapy  Daily Treatment Note  Date: 10/24/2024  Patient Name: Dahlia Quinn  MRN: [id number]     :   1975    Referring Physician: Luke Gamble PA [place]   PCP: Carlos Devine MD    Medical Diagnosis: No admission diagnoses are documented for this encounter.    Treatment Diagnosis: Right ankle achilles tendinitis      Insurance: Payor: [facility] / Plan: [facility] MEDICAID / Product Type: *No Product type* /   Insurance ID: [id number] - (Medicaid Managed)    Subjective:  General  Referring Provider (secondary): Tenzin  PT Visit Information  Onset Date: 24  Total # of Visits Approved: 24  Total # of Visits to Date: 18  Plan of Care/Certification Expiration Date: 24  No Show: 0  Canceled Appointment: 2  Referring Provider (secondary): Tenzin  Subjective  Subjective: Pt. cancelled perfsonal reasons.              Therapy Time:   Individual Concurrent Group Co-treatment   Time In           Time Out           Minutes  0                 Angi Montemayor PTA

## 2024-10-29 ENCOUNTER — HOSPITAL ENCOUNTER (OUTPATIENT)
Dept: PHYSICAL THERAPY | Age: 49
Setting detail: THERAPIES SERIES
Discharge: HOME | End: 2024-10-29
Payer: COMMERCIAL

## 2024-10-29 NOTE — PROGRESS NOTES
Physical Therapy  Daily Treatment Note  Date: 10/29/2024  Patient Name: Dahlia Quinn  MRN: [id number]     :   1975    Referring Physician: Luke Gamble PA [place]   PCP: Carlos Devine MD    Medical Diagnosis: No admission diagnoses are documented for this encounter.    Treatment Diagnosis: Right ankle achilles tendinitis      Insurance: Payor: [facility] / Plan: [facility] MEDICAID / Product Type: *No Product type* /   Insurance ID: [id number] - (Medicaid Managed)    Subjective:  General  Referring Provider (secondary): Tenzin  PT Visit Information  Onset Date: 24  Total # of Visits Approved: 24  Total # of Visits to Date: 18  Plan of Care/Certification Expiration Date: 24  No Show: 0  Canceled Appointment: 3  Referring Provider (secondary): Tenzin  Subjective  Subjective: Pt. cancelled stuck in [place].         Therapy Time:   Individual Concurrent Group Co-treatment   Time In           Time Out           Minutes  0                 Angi Montemayor PTA

## 2024-10-31 ENCOUNTER — HOSPITAL ENCOUNTER (OUTPATIENT)
Dept: PHYSICAL THERAPY | Age: 49
Setting detail: THERAPIES SERIES
Discharge: HOME | End: 2024-10-31
Payer: COMMERCIAL

## 2024-10-31 PROCEDURE — 97035 APP MDLTY 1+ULTRASOUND EA 15: CPT

## 2024-10-31 PROCEDURE — 97140 MANUAL THERAPY 1/> REGIONS: CPT

## 2024-10-31 PROCEDURE — 97110 THERAPEUTIC EXERCISES: CPT

## 2024-10-31 ASSESSMENT — PAIN DESCRIPTION - ORIENTATION: ORIENTATION: RIGHT

## 2024-10-31 ASSESSMENT — PAIN DESCRIPTION - LOCATION: LOCATION: ANKLE

## 2024-10-31 ASSESSMENT — PAIN DESCRIPTION - PAIN TYPE: TYPE: ACUTE PAIN

## 2024-10-31 ASSESSMENT — PAIN SCALES - GENERAL: PAINLEVEL_OUTOF10: 8

## 2024-10-31 ASSESSMENT — PAIN DESCRIPTION - DESCRIPTORS: DESCRIPTORS: TIGHTNESS;CRAMPING

## 2024-10-31 NOTE — PROGRESS NOTES
Physical Therapy  Physical Therapy: Daily Note   Patient: Dahlia DUVALL Workman (49 y.o. female)   Examination Date: 10/31/2024  Plan of Care/Certification Expiration Date: 24    Progress Note Counter: Pt getting twitching in the muscle in the right LE and spasming, the feeling right before it cramps up. S/S get worse when flexing her toes upward.     :  1975 # of Visits since SOC:   19   MRN: [id number]  CSN: [id number] Start of Care Date:   2024   Insurance: Payor: [facility] / Plan: [facility] MEDICAID / Product Type: *No Product type* /   Insurance ID: [id number] - (Medicaid Managed) Secondary Insurance (if applicable):    Referring Physician: Luke Gamble PA [place]   PCP: Carlos Devine MD Visits to Date/Visits Approved:     No Show/Cancelled Appts: 0 / 3     Medical Diagnosis: No admission diagnoses are documented for this encounter.    Treatment Diagnosis: Right ankle achilles tendinitis        SUBJECTIVE EXAMINATION   Pain Level: Pain Screening  Patient Currently in Pain: Yes  Pain Assessment: 0-10  Pain Level: 8  Pain Type: Acute pain  Pain Location: Ankle  Pain Orientation: Right  Pain Descriptors: Tightness, Cramping (tightness, cramps on PF)    Patient Comments: Subjective: Pt. states she has been doing \"so so\".  Pt. states she notices when she does not have therapy and reports her ankle continues to tighten up on her.  Pt. states she is going to try and talk with Ortho about constant tightness despite PT and HEP as well as taking muscle relaxer 3 x's per day.    HEP Compliance: Good        OBJECTIVE EXAMINATION   Restrictions:  Restrictions/Precautions: -- (Fell in October- in bedroom hit R knee,L wrist, R hip, walking from bed to doorway without AD)   Required Braces or Orthoses?: Yes (B wrist braces for night)   No data recorded              TREATMENT     Exercises:      Treatment Reasoning    Exercise 1: long sit gastroc stretch with sheet x3, 30 sec

## 2024-11-05 ENCOUNTER — HOSPITAL ENCOUNTER (OUTPATIENT)
Dept: PHYSICAL THERAPY | Age: 49
Setting detail: THERAPIES SERIES
Discharge: HOME | End: 2024-11-05
Payer: COMMERCIAL

## 2024-11-05 PROCEDURE — 97110 THERAPEUTIC EXERCISES: CPT

## 2024-11-05 PROCEDURE — 97140 MANUAL THERAPY 1/> REGIONS: CPT

## 2024-11-05 PROCEDURE — 97530 THERAPEUTIC ACTIVITIES: CPT

## 2024-11-05 ASSESSMENT — PAIN DESCRIPTION - DESCRIPTORS: DESCRIPTORS: ACHING;TIGHTNESS

## 2024-11-05 ASSESSMENT — PAIN DESCRIPTION - ORIENTATION: ORIENTATION: RIGHT

## 2024-11-05 ASSESSMENT — PAIN SCALES - GENERAL: PAINLEVEL_OUTOF10: 6

## 2024-11-05 ASSESSMENT — PAIN DESCRIPTION - LOCATION: LOCATION: ANKLE

## 2024-11-05 NOTE — PROGRESS NOTES
Physical Therapy: Monthly Recheck/ Discharge Summary   Patient: Dahlia Myersman (49 y.o. female)   Examination Date: 2024  Plan of Care/Certification Expiration Date: 11/15/24    Progress Note Counter: Pt getting twitching in the muscle in the right LE and spasming, the feeling right before it cramps up. S/S get worse when flexing her toes upward.     :  1975 # of Visits since SOC:   20   MRN: [id number]  CSN: [id number] Start of Care Date:   2024   Insurance: Payor: [facility] / Plan: [facility] MEDICAID / Product Type: *No Product type* /   Insurance ID: [id number] - (Medicaid Managed) Secondary Insurance (if applicable):    Referring Physician: Luke Gamble PA     PCP: Carlos Devine MD Visits to Date/Visits Approved:   ()    No Show/Cancelled Appts: 0 / 3     Medical Diagnosis: No admission diagnoses are documented for this encounter.    Treatment Diagnosis: Right ankle achilles tendinitis        SUBJECTIVE EXAMINATION   Pain Level: Pain Screening  Patient Currently in Pain: Yes  Pain Assessment: 0-10  Pain Level: 6 (5-6)  Pain Location: Ankle  Pain Orientation: Right  Pain Descriptors: Aching, Tightness    Patient Comments: Subjective: Pt reports some improement since initiating therapy especially short term relief following manual therapy and applicationof KT. Continued complaints of pain 8-9/10 R foot first thing in the morning then 5-6/10 rest of day after stretching. 3-4/10 at rest approx 50% of the day. Pt also reporting walking shor household distances without device (20 feet or less) and using crutches remaining time in the house, rollator outside of home.    HEP Compliance: Good  Any changes to allergies, medications, or have you had any medical procedures/ER visits since your last visit?: No     OBJECTIVE EXAMINATION   Restrictions:  Restrictions/Precautions: -- (Fell in October- in bedroom hit R knee,L wrist, R hip, walking from bed to doorway without

## 2024-11-07 ENCOUNTER — HOSPITAL ENCOUNTER (OUTPATIENT)
Dept: PHYSICAL THERAPY | Age: 49
Setting detail: THERAPIES SERIES
Discharge: HOME | End: 2024-11-07
Payer: COMMERCIAL

## 2024-11-07 PROCEDURE — 97110 THERAPEUTIC EXERCISES: CPT

## 2024-11-07 PROCEDURE — 97140 MANUAL THERAPY 1/> REGIONS: CPT

## 2024-11-07 PROCEDURE — 97035 APP MDLTY 1+ULTRASOUND EA 15: CPT

## 2024-11-07 ASSESSMENT — PAIN DESCRIPTION - PAIN TYPE: TYPE: ACUTE PAIN

## 2024-11-07 ASSESSMENT — PAIN SCALES - GENERAL: PAINLEVEL_OUTOF10: 6

## 2024-11-07 ASSESSMENT — PAIN DESCRIPTION - LOCATION: LOCATION: ANKLE

## 2024-11-07 ASSESSMENT — PAIN DESCRIPTION - ORIENTATION: ORIENTATION: RIGHT

## 2024-11-07 ASSESSMENT — PAIN DESCRIPTION - DESCRIPTORS: DESCRIPTORS: ACHING

## 2024-11-07 NOTE — PROGRESS NOTES
through.  Pt. discharged to HEP.  Body Structures, Functions, Activity Limitations Requiring Skilled Therapeutic Intervention: Decreased functional mobility , Decreased ROM, Decreased strength, Decreased endurance, Decreased balance, Increased pain    Post-Treatment Pain Level:  4    Activity Tolerance: Patient limited by pain    Therapy Prognosis: Good       GOALS   Patient Goals : To get rid of her right ankle pain and be able to walk like normal again  Short Term Goals Completed by 1-2 weeks Current Status Goal Status   Pt reports able to complete her HEP 3-5x per week Pt reports performing HEP every day-stretches, TBand resistance exs 3-4 days per wk Met   Pt reports having 4/10 or less right achilles/right ankle pain in am Pt reports 8-9/10 in am but 5-6/10  after stretching during mid and later day, 3-4/10 at rest Not Met                                                                       Long Term Goals Completed by 8-10 weeks changed at recheck 8/26/24 Current Status Goal Status   Pt reports having 2/10 or less right ankle pain for 75% of her day or better Pt reports ankle pain 3-4/10 at rest which is approx 50% of the day 5-6/10 when standing/walking mid day and later in the day, 8-9 first thing in the AM Not Met, In progress   Increase right ankle AROM DF 0-20 degrees and PF=0-60 degrees IV=0-30 degrees to improve pts mobiltiy Recheck 10/01/24: AROM df 8 deg, Pf WFL, inv 30 deg ev 20 deg In progress, Partially met   Improve pts right ankle strength to 4+/5 or > so that pt can ambulate >300'x 1 with AD and equal step length B LE Ankle strength goal met 4+/5 however amb distance limited to 240 feet with rollator limited due to back pain and increased Achilles/lower leg pain last 60 feet (back 8/10 R post lat lower leg 5-6/10 initially up to 7/10 last 60 feet, In progress, Partially met   Improve pts Mod LEFS from 64% disability score to <25% by time of discharge Recheck 11/05/24: Mod LEFS= 69%- no change

## 2024-11-09 DIAGNOSIS — J45.40 MODERATE PERSISTENT ASTHMA WITHOUT COMPLICATION: Chronic | ICD-10-CM

## 2024-11-10 NOTE — TELEPHONE ENCOUNTER
Pharmacy is requesting medication refill. Please approve or deny this request.    Rx requested:  Requested Prescriptions     Pending Prescriptions Disp Refills    albuterol sulfate HFA (PROVENTIL;VENTOLIN;PROAIR) 108 (90 Base) MCG/ACT inhaler [Pharmacy Med Name: ALBUTEROL HFA (PROAIR) INHALER] 8.5 each 2     Sig: INHALE 2 PUFFS INTO THE LUNGS EVERY 6 HOURS AS NEEDED FOR WHEEZING OR SHORTNESS OF BREATH         Last Office Visit:   8/6/2024      Next Visit Date:  No future appointments.

## 2024-11-11 RX ORDER — ALBUTEROL SULFATE 90 UG/1
2 INHALANT RESPIRATORY (INHALATION) EVERY 6 HOURS PRN
Qty: 8.5 EACH | Refills: 2 | Status: SHIPPED | OUTPATIENT
Start: 2024-11-11

## 2024-11-23 DIAGNOSIS — M62.830 BACK MUSCLE SPASM: ICD-10-CM

## 2024-11-25 RX ORDER — CYCLOBENZAPRINE HCL 10 MG
TABLET ORAL
Qty: 90 TABLET | Refills: 1 | Status: SHIPPED | OUTPATIENT
Start: 2024-11-25

## 2024-11-25 NOTE — TELEPHONE ENCOUNTER
Comments:     Last Office Visit (last PCP visit):   8/6/2024    Next Visit Date:  No future appointments.    **If hasn't been seen in over a year OR hasn't followed up according to last diabetes/ADHD visit, make appointment for patient before sending refill to provider.    Rx requested:  Requested Prescriptions     Pending Prescriptions Disp Refills    cyclobenzaprine (FLEXERIL) 10 MG tablet [Pharmacy Med Name: CYCLOBENZAPRINE 10 MG TABLET] 90 tablet 1     Sig: TAKE 1 TABLET BY MOUTH THREE TIMES A DAY AS NEEDED FOR MUSCLE SPASM

## 2025-02-15 DIAGNOSIS — M62.830 BACK MUSCLE SPASM: ICD-10-CM

## 2025-02-17 RX ORDER — CYCLOBENZAPRINE HCL 10 MG
TABLET ORAL
Qty: 90 TABLET | Refills: 1 | Status: SHIPPED | OUTPATIENT
Start: 2025-02-17

## 2025-02-17 NOTE — TELEPHONE ENCOUNTER
Pharmacy is requesting medication refill. Please approve or deny this request.    Rx requested:  Requested Prescriptions     Pending Prescriptions Disp Refills    cyclobenzaprine (FLEXERIL) 10 MG tablet [Pharmacy Med Name: CYCLOBENZAPRINE 10 MG TABLET] 90 tablet 1     Sig: TAKE 1 TABLET BY MOUTH THREE TIMES A DAY AS NEEDED FOR MUSCLE SPASM         Last Office Visit:   8/6/2024      Next Visit Date:  No future appointments.

## 2025-03-11 RX ORDER — MELOXICAM 15 MG/1
15 TABLET ORAL DAILY
Qty: 30 TABLET | Refills: 8 | Status: SHIPPED | OUTPATIENT
Start: 2025-03-11

## 2025-03-11 NOTE — TELEPHONE ENCOUNTER
Pharmacy is requesting medication refill. Please approve or deny this request.    Rx requested:  Requested Prescriptions     Pending Prescriptions Disp Refills    meloxicam (MOBIC) 15 MG tablet [Pharmacy Med Name: MELOXICAM 15 MG TABLET] 30 tablet 8     Sig: TAKE 1 TABLET BY MOUTH EVERY DAY         Last Office Visit:   8/6/2024      Next Visit Date:  No future appointments.

## 2025-03-25 ENCOUNTER — TELEPHONE (OUTPATIENT)
Dept: FAMILY MEDICINE CLINIC | Age: 50
End: 2025-03-25

## 2025-03-25 DIAGNOSIS — J45.40 MODERATE PERSISTENT ASTHMA WITHOUT COMPLICATION: Chronic | ICD-10-CM

## 2025-03-25 RX ORDER — MONTELUKAST SODIUM 10 MG/1
10 TABLET ORAL NIGHTLY
Qty: 90 TABLET | Refills: 1 | Status: SHIPPED | OUTPATIENT
Start: 2025-03-25

## 2025-03-25 RX ORDER — MELOXICAM 15 MG/1
15 TABLET ORAL DAILY
Qty: 30 TABLET | Refills: 8 | Status: SHIPPED | OUTPATIENT
Start: 2025-03-25

## 2025-03-25 RX ORDER — MONTELUKAST SODIUM 10 MG/1
10 TABLET ORAL NIGHTLY
Qty: 90 TABLET | Refills: 1 | OUTPATIENT
Start: 2025-03-25

## 2025-03-25 NOTE — TELEPHONE ENCOUNTER
[facility] Discharge Summary





- Hospital Course


Free Text/Narrative: 


Term  male delivered to a 24 year old G1 at 39w3d based on LMP and 11 

week US. Mom was induced for mild pre-eclampsia. Fetal heart tracing was 

category 2 during labor. Delivered via vacuum assisted vaginal delivery on  at 0933. Apgars were 8 and 9 with stimulation and bulb suctioning. Birth 

weight 3355 g. Baby is breastfeeding well. 








- Discharge Data


Date of Birth: 19


Delivery Time: 09:33


Discharge Disposition: Home, Self-Care 01


Condition: Good





- Discharge Plan


Instructions:  Jaundice, [facility], Keeping Your  Safe and Healthy, Easy-to

-Read, Well , 





[facility] Discharge Instructions





- Discharge 


Diet: Breastfeeding


Activity: Don't Co-Sleep w/Infant, Keep Away-Large Crowds, Keep Away-Sick People

, Place on Back to Sleep


Notify Provider of: Fever Over 100.4 Rectally, No Wet Diaper Over 18 Hrs


OAE Results Left Ear: Pass


OAE Results Right Ear: Pass





[facility] History





-  Admission Detail


Date of Service: 19


Infant Delivery Method: Spontaneous Vaginal Delivery-Single


Infant Delivery Mode: Vacuum Extraction





- Maternal History


: 1


Term: 0


: 0


Abortions: 0


Live Births: 0


Mother's Blood Type: AB


Mother's Rh: Positive


Maternal Hepatitis B: Negative


Maternal STD: Negative


Maternal HIV: Negative


Maternal Group Beta Strep/GBS: Negative


Maternal VDRL: Negative


Maternal Urine Toxicology: Negative


Prenatal Care Received: Yes


MD Office Called for Prenatal Records: No


Labs Drawn if Required: Yes





- Delivery Data


APGAR Total Score 1 Minute: 8


APGAR Total Score 5 Minutes: 9


Resuscitation Effort: Bulb Suction, Dried and Stimulated, Place in Radiant 

Warmer


 Support Required: [facility] Nursery





[facility] Nursery Info & Exam





- Exam


Exam: See Below





- Vital Signs


Vital Signs: 


 Last Vital Signs











Temp  98.7 F   19 08:00


 


Pulse  132   19 08:00


 


Resp  34   19 08:00


 


BP  77/40   19 08:00


 


Pulse Ox      











[facility] Birth Weight: 3.355 kg


Current Weight: 3.175 kg (down 5.4%)


Height: 1 ft 8.25 in





- Nursery Information


Sex, Infant: Male


Cry Description: Normal Pitch


[place] Reflex: Normal Response


Suck Reflex: Normal Response


Head Circumference: 1 ft 1.25 in


Bed Type: Open Crib


Birth Complications: None





- General/Neuro


Activity: Sleeping


Resting Posture: Flexion





- [place] Scoring


Neuro Posture, NB: Flexion All Limbs


Neuro Square Window: Wrist 30 Degrees


Neuro Arm Recoil: Arm Recoil  Degrees


Neuro Popliteal Angle: Popliteal Angle 90 Degrees


Neuro Scarf Sign: Elbow at Same Side


Neuro Heel to Ear: Knee Bent to 90 Heel Reaches 90 Degrees from Prone


Neuro Maturity Score: 19


Physical Skin: [place], Deep Cracking, No Vessels


Physical Lanugo: Bald Areas


Physical Plantar Surface: Creases Anterior 2/3


Physical Breast: Raised Areola, 3-4 mm Bud


Physical Eye/Ear: Formed and Firm, Instant Recoil


Physical Genitals - Male: Testes Pendulous, Deep Rugae


Physical Maturity Score: 20


Maturity Ratin





- Physical Exam


Head: Face Symmetrical, Atraumatic, Normocephalic


Eyes: Bilateral: Normal Inspection


Ears: Normal Appearance, Symmetrical


Nose: Normal Inspection, Normal Mucosa


Mouth: Nnormal Inspection, Palate Intact


Neck: Normal Inspection, Supple, Trachea Midline


Chest/Cardiovascular: Normal Appearance, Normal Peripheral Pulses, Regular 

Heart Rate


Respiratory: Lungs Clear, Normal Breath Sounds, No Respiratoy Distress


Abdomen/GI: Normal Bowel Sounds, No Mass, Symmetrical, Soft


Rectal: Normal Exam


Genitalia (Male): Normal Inspection


Spine/Skeletal: Normal Inspection, Normal Range of Motion


Extremities: Normal Inspection, Normal Capillary Refill, Normal Range of Motion


Skin: Dry, Intact, Normal Color, Warm





[facility] POC Testing





- Congenital Heart Disease Screening


CCHD O2 Saturation, Right Hand: 100


CCHD O2 Saturation, Left Foot: 98


CCHD Screen Result: Pass





- Bilirubin Screening


POC Bilirubin Transcutaneous: 9.2


Delivery Date: 19


Delivery Time: 09:33


Bili Age in Days/Hours: 1 Days  19 Hours Pt calling requesting  a new pharmacy   Please send scripts to [facility] in [place] .      Pharmacy has been changed to [facility] [place] .    LOV 8/6/24  FOV 4/3/25

## 2025-03-25 NOTE — TELEPHONE ENCOUNTER
Comments:     Last Office Visit (last PCP visit):   8/6/2024    Next Visit Date:  No future appointments.    **If hasn't been seen in over a year OR hasn't followed up according to last diabetes/ADHD visit, make appointment for patient before sending refill to provider.    Rx requested:  Requested Prescriptions     Pending Prescriptions Disp Refills    montelukast (SINGULAIR) 10 MG tablet 90 tablet 1     Sig: Take 1 tablet by mouth nightly

## 2025-03-25 NOTE — TELEPHONE ENCOUNTER
Comments:     Last Office Visit (last PCP visit):   8/6/2024    Next Visit Date:  No future appointments.    **If hasn't been seen in over a year OR hasn't followed up according to last diabetes/ADHD visit, make appointment for patient before sending refill to provider.    Rx requested:  Requested Prescriptions     Pending Prescriptions Disp Refills    meloxicam (MOBIC) 15 MG tablet 30 tablet 8     Sig: Take 1 tablet by mouth daily

## 2025-03-27 RX ORDER — FLUTICASONE PROPIONATE 110 UG/1
AEROSOL, METERED RESPIRATORY (INHALATION)
Qty: 12 G | Refills: 2 | Status: SHIPPED | OUTPATIENT
Start: 2025-03-27

## 2025-03-27 NOTE — TELEPHONE ENCOUNTER
"Jamie Gonzalez (82 y.o. Female)     Date of Birth Social Security Number Address Home Phone MRN    1938  [address] [place] [address]  [id number]    Taoist Marital Status          Sikhism        Admission Date Admission Type Admitting Provider Attending Provider Department, Room/Bed    12/28/20 Emergency John Irvin MD Kalantar, Masoud, MD [facility] 3F, S327/1    Discharge Date Discharge Disposition Discharge Destination         Skilled Nursing Facility (DC - External)              Attending Provider: John Irvin MD    Allergies: Dilaudid [Hydromorphone Hcl], Beta Adrenergic Blockers, Lactose Intolerance (Gi)    Isolation: None   Infection: None   Code Status: No CPR    Ht: 165.1 cm (65\")   Wt: 63.2 kg (139 lb 5.3 oz)    Admission Cmt: None   Principal Problem: Hypertensive encephalopathy [I67.4]                 Active Insurance as of 12/28/2020     Primary Coverage     Payor Plan Insurance Group Employer/Plan Group    MEDICARE MEDICARE A & B      Payor Plan Address Payor Plan Phone Number Payor Plan Fax Number Effective Dates    [address] [phone]  11/1/2003 - None Entered    [facility] [id number]       Subscriber Name Subscriber Birth Date Member ID       JAMIE GONZALEZ 1938 [id number]           Secondary Coverage     Payor Plan Insurance Group Employer/Plan Group    AARP MC SUP AAR HEALTH CARE OPTIONS      Payor Plan Address Payor Plan Phone Number Payor Plan Fax Number Effective Dates    [facility] [phone]  1/1/2016 - None Entered    [address]       [facility] [address]       Subscriber Name Subscriber Birth Date Member ID       JAMIE GONZALEZ 1938 [id number]                 Emergency Contacts      (Rel.) Home Phone Work Phone Mobile Phone    Simón Diaz (Son) [phone] -- --    Joey Diaz (Grandchild) -- -- [phone]            Insurance Information                MEDICARE/MEDICARE " Comments:     Last Office Visit (last PCP visit):   8/6/2024    Next Visit Date:  Future Appointments   Date Time Provider Department Center   4/3/2025  9:00 AM Carlos Devine MD [facility] DEP       **If hasn't been seen in over a year OR hasn't followed up according to last diabetes/ADHD visit, make appointment for patient before sending refill to provider.    Rx requested:  Requested Prescriptions     Pending Prescriptions Disp Refills    fluticasone (FLOVENT HFA) 110 MCG/ACT inhaler [Pharmacy Med Name: FLUTICASONE PROP  MCG] 12 each 2     Sig: INHALE 2 PUFFS INTO THE LUNGS IN THE MORNING AND AT BEDTIME                    "A & B Phone: [phone]    Subscriber: Zohra Hall Subscriber#: [id number]    Group#:  Precert#:         [facility] HE[facility] C[facility] OPT[facility] Phone: [phone]    Subscriber: Zohra Hall Subscriber#: [id number]    Group#:  Precert#:         Linked Results    Procedure Abnormality Status   COVID-19 and FLU A/B PCR - Swab, Nasopharynx Normal Final result   Reprint Order Requisition    COVID PRE-OP / PRE-PROCEDURE SCREENING ORDER (NO ISOLATION) - Swab, Nasopharynx (Order #[id number]) on 21          History & Physical      Sunni Rider MD at 20 [address] [place] [address] Medicine Services  HISTORY AND PHYSICAL    Patient Name: Zohra Hall  : 1938  MRN: [id number]  Primary Care Physician: Aleah Regan MD  Date of admission: 2020      Subjective   Subjective     Chief Complaint:  Confusion     HPI:  Zohra Hall is a 82 y.o. female With past medical history of hypertension who presents to the ED for confusion.  Patient has been confused she says \"for a while\", she went to see a doctor, they measured her blood pressure and told her to come to the ED. She is also having an associated right-sided, pressure-like headache, nonradiating, associated with blurry vision, and she says some left-sided weakness.  Denies any trouble swallowing, slurred speech,  or imbalance.      Current COVID Risks are:  [] Fever []  Cough [] Shortness of breath [] Fatigue [] Change in taste or smell    [] Exposure to COVID positive patient  [] High risk facility   [x]  NONE    Review of Systems   Constitutional: Negative for appetite change, fatigue and fever.   HENT: Negative for congestion, sneezing and sore throat.    Respiratory: Negative for cough and shortness of breath.    Cardiovascular: Negative for chest pain, palpitations and leg swelling.   Gastrointestinal: Negative for abdominal distention and abdominal pain.   Genitourinary: Positive for frequency. " "Negative for difficulty urinating and dysuria.   Musculoskeletal: Negative for back pain and gait problem.   Skin: Negative for color change and pallor.   Neurological: Negative for dizziness and numbness.   Hematological: Negative for adenopathy. Does not bruise/bleed easily.   Psychiatric/Behavioral: Negative for agitation and behavioral problems.      All other systems reviewed and are negative.     Personal History     Past Medical History:   Diagnosis Date   • Breast cancer (CMS/HCC) 1987    left- pt states \"in situ\", no radiation, Tamoxifen for 5 years   • Cellulitis    • Cervical lymphadenopathy    • Chest pain    • Convulsions (CMS/HCC)    • GERD (gastroesophageal reflux disease)    • Glossitis    • Grief reaction     · Last Impression: 29 Jan 2015  counselled, given ativan for prn use.  Aleah Regan   • Hypertension    • Lower back pain    • Oral thrush    • Papillary adenocarcinoma (CMS/HCC)     Papillary adenocarcinoma of thyroid   • Papillary adenocarcinoma of thyroid (CMS/HCC)     · resection Ramirez- 1/13,  2 x 2 x 1.5 cm  T3 N1 MXpost op low calcium and diminished  voiceablation Ain- 3/7 metastatic nodes and invasion to strap muscles · Last Impression: 29 Oct 2014  s/p Eval by berry Méndez.  Aleah Regan (Internal   • Piriformis syndrome    • Tingling    • Xerostomia        Past Surgical History:   Procedure Laterality Date   • BREAST BIOPSY Right 10/10/2013    stereo bx   • BREAST BIOPSY Left 10/19/2015    stereo bx   • BREAST CYST EXCISION      right   • BREAST EXCISIONAL BIOPSY Right     affirm bx and loc 2016.   • BREAST LUMPECTOMY Left 1987   • HYSTERECTOMY  1979   • OTHER SURGICAL HISTORY Right     right arm surgery-steel roger in place   • TOTAL HIP ARTHROPLASTY     • TOTAL THYROIDECTOMY      Thyroid Surgery Total Thyroidectomy       Family History: family history includes Breast cancer (age of onset: 84) in her mother; Cancer in her mother. Otherwise pertinent FHx was reviewed and " unremarkable.     Social History:  reports that she quit smoking about 28 years ago. Her smoking use included cigarettes. She has a 30.00 pack-year smoking history. She has never used smokeless tobacco. She reports that she does not drink alcohol or use drugs.  Social History     Social History Narrative   • Not on file       Medications:  Available home medication information reviewed.  (Not in a hospital admission)      Allergies   Allergen Reactions   • Dilaudid [Hydromorphone Hcl] Hallucinations     TABS   • Lactose Intolerance (Gi) Diarrhea       Objective   Objective     Vital Signs:   Temp:  [98.8 °F (37.1 °C)-98.9 °F (37.2 °C)] 98.8 °F (37.1 °C)  Heart Rate:  [68-92] 79  Resp:  [14-16] 14  BP: (181-220)/(100-118) 200/102       Physical Exam   Constitutional: Awake, alert  Eyes: PERRLA, sclerae anicteric, no conjunctival injection  HENT: NCAT, mucous membranes moist  Neck: Supple, no thyromegaly, no lymphadenopathy, trachea midline  Respiratory: Clear to auscultation bilaterally, nonlabored respirations on room air  Cardiovascular: RRR, no murmurs, no lower extremity edema   Gastrointestinal: Positive bowel sounds, soft, nontender, nondistended  Psychiatric: Appropriate affect, cooperative, appears to have good insight into medical condition   Neurologic: Oriented to person and place, unable to tell me the date or president, Cranial Nerves grossly intact to confrontation, speech clear  Skin: No rashes on exposed skin     Results Reviewed:  I have personally reviewed most recent indicated data and agree with findings including:  [x]  Laboratory  [x]  Radiology  []  EKG/Telemetry  []  Pathology  []  Cardiac/Vascular Studies  []  Old records  []  Other:  Most pertinent findings include:   TSH 34  WBC 4.76  MRI brain with stable appearance of brain, fairly advanced volume loss and periventricular white matter with chronic small vessel ischemia without infarct, hemorrhage, mass, or mass effect    LAB RESULTS:       Lab 12/28/20  1153   WBC 4.76   HEMOGLOBIN 13.7   HEMATOCRIT 42.8   PLATELETS 280   NEUTROS ABS 3.31   IMMATURE GRANS (ABS) 0.02   LYMPHS ABS 0.89   MONOS ABS 0.42   EOS ABS 0.07   MCV 92.0         Lab 12/28/20  1151   SODIUM 137   POTASSIUM 3.8   CHLORIDE 95*   CO2 28.0   ANION GAP 14.0   BUN 9   CREATININE 0.92   GLUCOSE 90   CALCIUM 9.8   MAGNESIUM 1.8   TSH 34.000*         Lab 12/28/20  1151   TOTAL PROTEIN 7.5   ALBUMIN 4.40   GLOBULIN 3.1   ALT (SGPT) 7   AST (SGOT) 18   BILIRUBIN 0.3   ALK PHOS 97         Lab 12/28/20  1151   TROPONIN T <0.010                 Brief Urine Lab Results  (Last result in the past 365 days)      Color   Clarity   Blood   Leuk Est   Nitrite   Protein   CREAT   Urine HCG        11/01/20 1342 Yellow Clear Negative Negative Negative 30 mg/dL (1+)             Microbiology Results (last 10 days)     ** No results found for the last 240 hours. **        Imaging Results (Last 24 Hours)     Procedure Component Value Units Date/Time    MRI Brain Without Contrast [[id number]] Collected: 12/28/20 1454     Updated: 12/28/20 1502    Narrative:      EXAMINATION: MRI BRAIN WO CONTRAST-      INDICATION: confusion, hypertensive crisis; I67.4-Hypertensive  encephalopathy; I16.9-Hypertensive crisis, unspecified     TECHNIQUE: Multiplanar multisequence MRI of the brain performed without  contrast     COMPARISON: 10/31/2020     FINDINGS: No acute infarct noted on diffusion weighted sequences.  Midline structures are unremarkable and the craniocervical junction is  satisfactory in appearance. Redemonstrated advanced T2 hyperintense  periventricular white matter changes likely related to chronic small  vessel ischemia. Unchanged degree of diffuse volume loss, without lobar  predilection. There is associated prominence of the ventricles and sulci  which are unchanged. There is otherwise no evidence of intracranial  hemorrhage, mass or mass effect. The orbits are normal and the paranasal  sinuses are  "grossly clear.       Impression:      Stable appearance of the brain with fairly advanced volume  loss and periventricular white matter sequela chronic small vessel  ischemia. There is otherwise no evidence of infarct, hemorrhage, mass or  mass effect.        This report was finalized on 12/28/2020 2:58 PM by Chuy Jaime.       XR Chest 1 View [[id number]] Collected: 12/28/20 1220     Updated: 12/28/20 1224    Narrative:      EXAMINATION: XR CHEST 1 VW-      INDICATION: Weak/Dizzy/AMS triage protocol      COMPARISON: 11/12/2013     FINDINGS: No focal airspace opacity. No pleural effusion or  pneumothorax. Normal heart and mediastinal contours.       Impression:      No evidence of acute disease in the chest.      This report was finalized on 12/28/2020 12:21 PM by Chuy Jaime.           Results for orders placed during the hospital encounter of 10/30/20   Adult Transthoracic Echo Complete W/ Cont if Necessary Per Protocol    Narrative · Calculated left ventricular EF = 55%  · Left ventricular systolic function is normal.  · Left ventricular diastolic function is consistent with (grade Ia w/high   LAP) impaired relaxation.  · The left atrial cavity is mild to moderately dilated.  · No significant structural or functional valvular disease.          Assessment/Plan   Assessment & Plan     Active Hospital Problems    Diagnosis POA   • Hypertensive encephalopathy [I67.4] Yes   • Dyslipidemia [E78.5] Yes   • GERD without esophagitis [K21.9] Yes       Zohra Hall is a 82 y.o. female With past medical history of hypertension who presents to the ED for confusion.  Patient has been confused she says \"for a while\", she went to see a doctor, they measured her blood pressure and told her to come to the ED. She is also having an associated right-sided, pressure-like headache, nonradiating, associated with blurry vision, and she says some left-sided weakness.    Hypertensive encephalopathy   -MRI brain without acute " ischemia, or hemorrhage   -patient apparently not taking home medications   -resume home meds-amlodipine, atenolol, enalapril, and hytrin  -continue cardene drip to maintain -180   -PT/OT    Hypothyroidism  -TSH 34, check free t4   -previous admit in 2020 - TSH 18, no documented free t4  - at that admit she also received cytomel x4 doses   -patient has history of non adherence to medications   -continue levothyroxine 125 mcg daily    Urinary frequency   -no dysuria   -UA pending     Anxiety/depression-continue cymbalta  GERD- continue famotidine     DVT prophylaxis:  lovenox      CODE STATUS:    Code Status and Medical Interventions:   Ordered at: 20 1502     Level Of Support Discussed With:    Patient     Code Status:    No CPR     Medical Interventions (Level of Support Prior to Arrest):    Full       Admission Status:  I believe this patient meets INPATIENT status due to hypertensive encephalopathy.  I feel patient’s risk for adverse outcomes and need for care warrant INPATIENT evaluation and I predict the patient’s care encounter to likely last beyond 2 midnights.        Sunni Rider MD  20      Electronically signed by Sunni Rider MD at 20 1516          Physical Therapy Notes (most recent note)      Gama Fernandez, PT at 21 1316  Version 1 of 1         Patient Name: Zohra Hall  : 1938    MRN: [id number]                              Today's Date: 2021       Admit Date: 2020    Visit Dx:     ICD-10-CM ICD-9-CM   1. Hypertensive encephalopathy  I67.4 437.2   2. Hypertensive crisis  I16.9 401.9     Patient Active Problem List   Diagnosis   • Generalized osteoarthritis   • Iron deficiency   • Vitamin D deficiency   • Skin neoplasm   • Sialadenitis   • Restless leg syndrome   • Reaction to chronic stress   • Piriformis syndrome   • Papillary adenocarcinoma of thyroid (CMS/HCC)   • Hypothyroidism (acquired)   • Hypertension   • GERD without esophagitis  "  • Dyslipidemia   • Cervical lymphadenopathy   • Cellulitis   • Atherosclerosis of aorta (CMS/HCC)   • Breast mass   • Aortic valve stenosis   • Incontinence of bowel   • Chronic diastolic heart failure (CMS/HCC)   • Acute right-sided low back pain without sciatica   • Benign essential tremor   • Pain of right hip joint   • Thyroid cancer (CMS/HCC)   • Lacunar stroke (CMS/HCC)   • Nontraumatic rupture of extensor tendons of right hand and wrist   • Hyponatremia   • Actinic keratosis   • Transient ischemic attack   • Ataxia   • PMR (polymyalgia rheumatica) (CMS/HCC)   • Osteoporosis   • Hypertensive encephalopathy   • Bradycardia   • Hypotension due to drugs   • Post-surgical hypothyroidism     Past Medical History:   Diagnosis Date   • Breast cancer (CMS/HCC) 1987    left- pt states \"in situ\", no radiation, Tamoxifen for 5 years   • Cellulitis    • Cervical lymphadenopathy    • Chest pain    • Convulsions (CMS/HCC)    • GERD (gastroesophageal reflux disease)    • Glossitis    • Grief reaction     · Last Impression: 29 Jan 2015  counselled, given ativan for prn use.  Aleah Regan   • Hypertension    • Lower back pain    • Oral thrush    • Papillary adenocarcinoma (CMS/HCC)     Papillary adenocarcinoma of thyroid   • Papillary adenocarcinoma of thyroid (CMS/HCC)     · resection Ramirez- 1/13,  2 x 2 x 1.5 cm  T3 N1 MXpost op low calcium and diminished  voiceablation Ain- 3/7 metastatic nodes and invasion to strap muscles · Last Impression: 29 Oct 2014  s/p Eval by berry Méndez.  Aleah Regan (Internal   • Piriformis syndrome    • Tingling    • Xerostomia      Past Surgical History:   Procedure Laterality Date   • BREAST BIOPSY Right 10/10/2013    stereo bx   • BREAST BIOPSY Left 10/19/2015    stereo bx   • BREAST CYST EXCISION      right   • BREAST EXCISIONAL BIOPSY Right     affirm bx and loc 2016.   • BREAST LUMPECTOMY Left 1987   • HYSTERECTOMY  1979   • OTHER SURGICAL HISTORY Right     right arm " surgery-steel roger in place   • TOTAL HIP ARTHROPLASTY     • TOTAL THYROIDECTOMY      Thyroid Surgery Total Thyroidectomy     General Information     [facility] Name 01/01/21 1349          Physical Therapy Time and Intention    Document Type  therapy note (daily note)  -     Mode of Treatment  physical therapy  -[facility] Name 01/01/21 1349          General Information    Patient Profile Reviewed  yes  -     Existing Precautions/Restrictions  fall  -[facility] Name 01/01/21 1349          Cognition    Orientation Status (Cognition)  oriented to;person;verbal cues/prompts needed for orientation;disoriented to;time;situation  -[facility] Name 01/01/21 1349          Safety Issues, Functional Mobility    Safety Issues Affecting Function (Mobility)  ability to follow commands;awareness of need for assistance;insight into deficits/self-awareness;judgment;positioning of assistive device;problem-solving;sequencing abilities  -     Impairments Affecting Function (Mobility)  balance;cognition;endurance/activity tolerance;strength  -     Cognitive Impairments, Mobility Safety/Performance  awareness, need for assistance;safety precaution awareness;insight into deficits/self-awareness;safety precaution follow-through;sequencing abilities  -       User Key  (r) = Recorded By, (t) = Taken By, (c) = Cosigned By    Initials Name Provider Type     Gama Fernandez, PT Physical Therapist        Mobility     Row Name 01/01/21 1346          Bed Mobility    Supine-Sit [place] (Bed Mobility)  minimum assist (75% patient effort);verbal cues  -     Assistive Device (Bed Mobility)  bed rails;head of bed elevated  -     Comment (Bed Mobility)  Verbal cues for correct sequencing and HHA to pull into sitting EOB  -[facility] Name 01/01/21 1349          Transfers    Comment (Transfers)  VCs for hand placement  -[facility] Name 01/01/21 1349          Bed-Chair Transfer    Bed-Chair [place] (Transfers)  contact guard;verbal cues   -     Assistive Device (Bed-Chair Transfers)  walker, front-wheeled  -     Row Name 01/01/21 1349          Sit-Stand Transfer    Sit-Stand [place] (Transfers)  verbal cues;contact guard  -     Assistive Device (Sit-Stand Transfers)  walker, front-wheeled  -     Row Name 01/01/21 1349          Gait/Stairs (Locomotion)    [place] Level (Gait)  verbal cues;contact guard  -     Assistive Device (Gait)  walker, front-wheeled  -     Distance in Feet (Gait)  12 ft + 12 ft  -     Deviations/Abnormal Patterns (Gait)  bilateral deviations;radha decreased;stride length decreased  -     Bilateral Gait Deviations  forward flexed posture  -     Comment (Gait/Stairs)  Pt improved ambulation distance to 12 ft + 12 feet limited by fatigue. Slow gait speed and weak LEs noted.  -       User Key  (r) = Recorded By, (t) = Taken By, (c) = Cosigned By    Initials Name Provider Type     Gama Fernandez PT Physical Therapist        Obj/Interventions     [facility] Name 01/01/21 1352          Motor Skills    Therapeutic Exercise  hip;knee;ankle  -[facility] Name 01/01/21 1352          Hip (Therapeutic Exercise)    Hip (Therapeutic Exercise)  AROM (active range of motion)  -     Hip AROM (Therapeutic Exercise)  bilateral;flexion;10 repetitions  -[facility] Name 01/01/21 1352          Knee (Therapeutic Exercise)    Knee (Therapeutic Exercise)  strengthening exercise  -     Knee Strengthening (Therapeutic Exercise)  SLR (straight leg raise);heel slides;10 repetitions;bilateral;LAQ (long arc quad);3 sets  -[facility] Name 01/01/21 1352          Ankle (Therapeutic Exercise)    Ankle (Therapeutic Exercise)  AROM (active range of motion)  -     Ankle AROM (Therapeutic Exercise)  bilateral;dorsiflexion;plantarflexion;10 repetitions;2 sets  -[facility] Name 01/01/21 1352          Balance    Balance Assessment  sitting static balance;sitting dynamic balance;standing static balance;standing dynamic balance  -      Static Sitting Balance  WFL;sitting, edge of bed  -     Dynamic Sitting Balance  sitting, edge of bed;mild impairment  -     Static Standing Balance  mild impairment;supported;standing  -     Dynamic Standing Balance  mild impairment;supported;standing  -     Comment, Balance  right lateral lean occsionally in sitting, pt able to regain balance IND  -       User Key  (r) = Recorded By, (t) = Taken By, (c) = Cosigned By    Initials Name Provider Type     Gama Fernandez, PT Physical Therapist        Goals/Plan    No documentation.       Clinical Impression     Row Name 01/01/21 135          Pain    Additional Documentation  Pain Scale: Numbers Pre/Post-Treatment (Group)  -[facility] Name 01/01/21 4630          Pain Scale: Numbers Pre/Post-Treatment    Pretreatment Pain Rating  0/10 - no pain  -     Posttreatment Pain Rating  0/10 - no pain  -     Pre/Posttreatment Pain Comment  tolerated  -     Pain Intervention(s)  Repositioned;Ambulation/increased activity  -[facility] Name 01/01/21 4498          Plan of Care Review    Plan of Care Reviewed With  patient  -     Progress  improving  -     Outcome Summary  Pt alert to self only this session but pleasant and cooperative with therapy. Increased ambulation distance to 12 ft + 12 ft with CGA and RW limited by weakness and fatigue. BLE ther ex performed and pt quick to fatigue needing rest breaks. Plan to d/c to IRF.  -[facility] Name 01/01/21 9461          Therapy Assessment/Plan (PT)    Rehab Potential (PT)  good, to achieve stated therapy goals  -     Criteria for Skilled Interventions Met (PT)  yes;skilled treatment is necessary;meets criteria  -     Row Name 01/01/21 6143          Vital Signs    Pre Systolic BP Rehab  137  -     Pre Treatment Diastolic BP  75  -     Pretreatment Heart Rate (beats/min)  94  -     Intratreatment Heart Rate (beats/min)  120  -     Posttreatment Heart Rate (beats/min)  97  -     O2 Delivery Pre  Treatment  room air  -     O2 Delivery Intra Treatment  room air  -     O2 Delivery Post Treatment  room air  -     Pre Patient Position  Supine  -     Intra Patient Position  Standing  -     Post Patient Position  Sitting  -     Row Name 01/01/21 1353          Positioning and Restraints    Pre-Treatment Position  in bed  -     Post Treatment Position  chair  -     In Chair  notified nsg;reclined;call light within reach;encouraged to call for assist;exit alarm on;legs elevated  -       User Key  (r) = Recorded By, (t) = Taken By, (c) = Cosigned By    Initials Name Provider Type    Gama Felder, PT Physical Therapist        Outcome Measures     Row Name 01/01/21 1355          How much help from another person do you currently need...    Turning from your back to your side while in flat bed without using bedrails?  3  -JH     Moving from lying on back to sitting on the side of a flat bed without bedrails?  3  -JH     Moving to and from a bed to a chair (including a wheelchair)?  3  -JH     Standing up from a chair using your arms (e.g., wheelchair, bedside chair)?  3  -JH     Climbing 3-5 steps with a railing?  2  -JH     To walk in hospital room?  3  -     AM-PAC 6 Clicks Score (PT)  17  -     Row Name 01/01/21 1355          Functional Assessment    Outcome Measure Options  AM-PAC 6 Clicks Basic Mobility (PT)  -       User Key  (r) = Recorded By, (t) = Taken By, (c) = Cosigned By    Initials Name Provider Type    Gama Felder, PT Physical Therapist        Physical Therapy Education                 Title: PT OT SLP Therapies (In Progress)     Topic: Physical Therapy (Done)     Point: Mobility training (Done)     Learning Progress Summary           Patient Acceptance, E, VU,NR by  at 1/1/2021 1355    Comment: edu on safety during mobility and HEP    Acceptance, E,TB, VU by KG at 12/31/2020 1519    Acceptance, E,D, NR by AA at 12/30/2020 0943                   Point: Home exercise  program (Done)     Learning Progress Summary           Patient Acceptance, E, VU,NR by  at 1/1/2021 1355    Comment: edu on safety during mobility and HEP    Acceptance, E,TB, VU by  at 12/31/2020 1519                   Point: Body mechanics (Done)     Learning Progress Summary           Patient Acceptance, E, VU,NR by  at 1/1/2021 1355    Comment: edu on safety during mobility and HEP    Acceptance, E,TB, VU by  at 12/31/2020 1519    Acceptance, E,D, NR by  at 12/30/2020 0943                   Point: Precautions (Done)     Learning Progress Summary           Patient Acceptance, E, VU,NR by  at 1/1/2021 1355    Comment: edu on safety during mobility and HEP    Acceptance, E,TB, VU by  at 12/31/2020 1519    Acceptance, E,D, NR by  at 12/30/2020 0943                               User Key     Initials Effective Dates Name Provider Type Discipline     04/02/18 -  Diana Myrick, PT Physical Therapist PT     08/28/19 -  Raiza Pedraza Physical Therapist PT     09/22/20 -  Gama Fernandez PT Physical Therapist PT              PT Recommendation and Plan     Plan of Care Reviewed With: patient  Progress: improving  Outcome Summary: Pt alert to self only this session but pleasant and cooperative with therapy. Increased ambulation distance to 12 ft + 12 ft with CGA and RW limited by weakness and fatigue. BLE ther ex performed and pt quick to fatigue needing rest breaks. Plan to d/c to IRF.     Time Calculation:   PT Charges     Row Name 01/01/21 1356             Time Calculation    Start Time  1316  -      PT Received On  01/01/21  [facility]      PT Goal Re-Cert Due Date  01/09/21  -         Time Calculation- PT    Total Timed Code Minutes- PT  29 minute(s)  -         Timed Charges    80007 - PT Therapeutic Exercise Minutes  10  -      54271 - Gait Training Minutes   6  -      25361 - PT Therapeutic Activity Minutes  13  -        User Key  (r) = Recorded By, (t) = Taken By, (c) = Cosigned By     Initials Name Provider Type     Gama Fernandez PT Physical Therapist        Therapy Charges for Today     Code Description Service Date Service Provider Modifiers Qty    73794204229 HC PT THER PROC EA 15 MIN 2021 Gama Fernandez, PT GP 1    13143718200 HC PT THERAPEUTIC ACT EA 15 MIN 2021 Gama Fernandez, PT GP 1          PT G-Codes  Outcome Measure Options: AM-PAC 6 Clicks Basic Mobility (PT)  AM-PAC 6 Clicks Score (PT): 17  AM-PAC 6 Clicks Score (OT): 19    Gama Fernandez PT  2021      Electronically signed by Gama Fernandez PT at 21 1357          Occupational Therapy Notes (most recent note)      Gris Montoya, OT at 21 1338          Patient Name: Zohra Hall  : 1938    MRN: [id number]                              Today's Date: 2021       Admit Date: 2020    Visit Dx:     ICD-10-CM ICD-9-CM   1. Hypertensive encephalopathy  I67.4 437.2   2. Hypertensive crisis  I16.9 401.9     Patient Active Problem List   Diagnosis   • Generalized osteoarthritis   • Iron deficiency   • Vitamin D deficiency   • Skin neoplasm   • Sialadenitis   • Restless leg syndrome   • Reaction to chronic stress   • Piriformis syndrome   • Papillary adenocarcinoma of thyroid (CMS/HCC)   • Hypothyroid   • Hypertension   • GERD without esophagitis   • Dyslipidemia   • Cervical lymphadenopathy   • Cellulitis   • Atherosclerosis of aorta (CMS/HCC)   • Breast mass   • Aortic valve stenosis   • Incontinence of bowel   • Chronic diastolic heart failure (CMS/HCC)   • Acute right-sided low back pain without sciatica   • Benign essential tremor   • Pain of right hip joint   • Thyroid cancer (CMS/HCC)   • Lacunar stroke (CMS/HCC)   • Nontraumatic rupture of extensor tendons of right hand and wrist   • Hyponatremia   • Actinic keratosis   • Transient ischemic attack   • Ataxia   • PMR (polymyalgia rheumatica) (CMS/HCC)   • Osteoporosis   • Hypertensive encephalopathy   • Post-surgical hypothyroidism   •  "Uncontrolled hypertension     Past Medical History:   Diagnosis Date   • Breast cancer (CMS/HCC) 1987    left- pt states \"in situ\", no radiation, Tamoxifen for 5 years   • Cellulitis    • Cervical lymphadenopathy    • Chest pain    • Convulsions (CMS/HCC)    • GERD (gastroesophageal reflux disease)    • Glossitis    • Grief reaction     · Last Impression: 29 Jan 2015  counselled, given ativan for prn use.  Aleah Regan   • Hypertension    • Lower back pain    • Oral thrush    • Papillary adenocarcinoma (CMS/HCC)     Papillary adenocarcinoma of thyroid   • Papillary adenocarcinoma of thyroid (CMS/HCC)     · resection Ramirez- 1/13,  2 x 2 x 1.5 cm  T3 N1 MXpost op low calcium and diminished  voiceablation Ain- 3/7 metastatic nodes and invasion to strap muscles · Last Impression: 29 Oct 2014  s/p Eval by berry Méndez.  Aleah Regan (Internal   • Piriformis syndrome    • Tingling    • Xerostomia      Past Surgical History:   Procedure Laterality Date   • BREAST BIOPSY Right 10/10/2013    stereo bx   • BREAST BIOPSY Left 10/19/2015    stereo bx   • BREAST CYST EXCISION      right   • BREAST EXCISIONAL BIOPSY Right     affirm bx and loc 2016.   • BREAST LUMPECTOMY Left 1987   • HYSTERECTOMY  1979   • OTHER SURGICAL HISTORY Right     right arm surgery-steel roger in place   • TOTAL HIP ARTHROPLASTY     • TOTAL THYROIDECTOMY      Thyroid Surgery Total Thyroidectomy     General Information     Row Name 01/04/21 1518          OT Time and Intention    Document Type  therapy note (daily note)  -     Mode of Treatment  occupational therapy  -     Row Name 01/04/21 1518          General Information    Existing Precautions/Restrictions  fall  -HK     Barriers to Rehab  cognitive status;previous functional deficit;medically complex  -HK     Row Name 01/04/21 1518          Cognition    Orientation Status (Cognition)  oriented to;person;disoriented to;place;situation;time  -HK     Row Name 01/04/21 1518          " Safety Issues, Functional Mobility    Safety Issues Affecting Function (Mobility)  ability to follow commands;awareness of need for assistance;impulsivity;at risk behavior observed;insight into deficits/self-awareness;judgment;problem-solving;safety precaution awareness;safety precautions follow-through/compliance;sequencing abilities  -     Impairments Affecting Function (Mobility)  balance;cognition;endurance/activity tolerance;strength  -HK     Cognitive Impairments, Mobility Safety/Performance  attention;awareness, need for assistance;insight into deficits/self-awareness;judgment;problem-solving/reasoning;safety precaution awareness;safety precaution follow-through;sequencing abilities  -       User Key  (r) = Recorded By, (t) = Taken By, (c) = Cosigned By    Initials Name Provider Type     Gris Montoya, OT Occupational Therapist          Mobility/ADL's     Row Name 01/04/21 1521          Bed Mobility    Bed Mobility  scooting/bridging;supine-sit  -HK     Scooting/Bridging [place] (Bed Mobility)  contact guard;verbal cues  -     Supine-Sit [place] (Bed Mobility)  contact guard;verbal cues  -     Bed Mobility, Safety Issues  decreased use of arms for pushing/pulling  -     Assistive Device (Bed Mobility)  bed rails;head of bed elevated  -     Comment (Bed Mobility)  Pt advanced to EOB with CGA and extra time/effort.  -     Row Name 01/04/21 1521          Transfers    Transfers  sit-stand transfer;bed-chair transfer  -     Bed-Chair [place] (Transfers)  minimum assist (75% patient effort);1 person assist;verbal cues  -     Assistive Device (Bed-Chair Transfers)  -- L UE support  -     Sit-Stand [place] (Transfers)  minimum assist (75% patient effort);1 person assist  -     Row Name 01/04/21 1521          Sit-Stand Transfer    Assistive Device (Sit-Stand Transfers)  -- L UE support  -     Row Name 01/04/21 1521          Activities of Daily Living    BADL  "Assessment/Intervention  grooming  -     Row Name 01/04/21 1521          Grooming Assessment/Training    [place] Level (Grooming)  dependent (less than 25% patient effort);oral care regimen;moderate assist (50% patient effort);hair care, combing/brushing;wash face, hands;set up  -HK     Position (Grooming)  supported sitting  -HK     Comment (Grooming)  Pt adamantly declined to complete oral care stating she is \"shaking like a leaf\". OT provided dependence. Pt required modA for hair care due to excessive knotting in back.  -       User Key  (r) = Recorded By, (t) = Taken By, (c) = Cosigned By    Initials Name Provider Type    Gris Juarez, ANA MARÍA Occupational Therapist        Obj/Interventions     Row Name 01/04/21 1524          Sensory Assessment (Somatosensory)    Sensory Assessment (Somatosensory)  sensation intact  -     Row Name 01/04/21 1524          Balance    Balance Assessment  sitting static balance;sitting dynamic balance;standing static balance  -     Static Sitting Balance  WFL;sitting, edge of bed;unsupported  -     Dynamic Sitting Balance  WFL;unsupported;sitting, edge of bed  -HK     Static Standing Balance  mild impairment;supported;standing  -HK       User Key  (r) = Recorded By, (t) = Taken By, (c) = Cosigned By    Initials Name Provider Type    Gris Juarez, OT Occupational Therapist        Goals/Plan    No documentation.       Clinical Impression     Row Name 01/04/21 1524          Pain Assessment    Additional Documentation  Pain Scale: Numbers Pre/Post-Treatment (Group)  -[facility] Name 01/04/21 1524          Pain Scale: Numbers Pre/Post-Treatment    Pretreatment Pain Rating  0/10 - no pain  -     Posttreatment Pain Rating  0/10 - no pain  -     Row Name 01/04/21 1524          Plan of Care Review    Plan of Care Reviewed With  patient  -HK     Progress  improving  -HK     Outcome Summary  Pt received in bed wiping teeth with napkin. Pt completes bed mobility with CGA " and transfers with minAx1. Pt slightly impulsive and stands before cued. Pt sat supported in chair to complete hair care with modA due to excessive knotting. Pt washed face with setup. Pt adamantly decliend to complete oral care and stated teeth were clean. OT provided dependence for oral care and brushing dentures. At this time pt will require 24/7 assist. If family unable to provide recommend d/c to SNF. Pt is not safe to be alone at home.  -     Row Name 01/04/21 1524          Therapy Plan Review/Discharge Plan (OT)    Anticipated Discharge Disposition (OT)  skilled nursing facility  -     Row Name 01/04/21 1524          Vital Signs    Pre Systolic BP Rehab  -- RN cleared for tx; VSS  -HK     O2 Delivery Pre Treatment  room air  -HK     O2 Delivery Intra Treatment  room air  -HK     O2 Delivery Post Treatment  room air  -HK     Pre Patient Position  Supine  -HK     Intra Patient Position  Standing  -HK     Post Patient Position  Sitting  -     Row Name 01/04/21 1524          Positioning and Restraints    Pre-Treatment Position  in bed  -HK     Post Treatment Position  chair  -HK     In Chair  notified nsg;reclined;call light within reach;encouraged to call for assist;exit alarm on  -HK       User Key  (r) = Recorded By, (t) = Taken By, (c) = Cosigned By    Initials Name Provider Type     Gris Montoya, OT Occupational Therapist        Outcome Measures     Row Name 01/04/21 1529          How much help from another is currently needed...    Putting on and taking off regular lower body clothing?  2  -HK     Bathing (including washing, rinsing, and drying)  2  -HK     Toileting (which includes using toilet bed pan or urinal)  3  -HK     Putting on and taking off regular upper body clothing  4  -HK     Taking care of personal grooming (such as brushing teeth)  2  -HK     Eating meals  3  -HK     AM-PAC 6 Clicks Score (OT)  16  -     Row Name 01/04/21 1524          Functional Assessment    Outcome Measure  Options  AM-PAC 6 Clicks Daily Activity (OT)  -       User Key  (r) = Recorded By, (t) = Taken By, (c) = Cosigned By    Initials Name Provider Type     Gris Montoya OT Occupational Therapist        Occupational Therapy Education                 Title: PT OT SLP Therapies (In Progress)     Topic: Occupational Therapy (In Progress)     Point: ADL training (Done)     Description:   Instruct learner(s) on proper safety adaptation and remediation techniques during self care or transfers.   Instruct in proper use of assistive devices.              Learning Progress Summary           Patient Acceptance, E,TB,D,H, VU,NR by  at 1/4/2021 1528    Acceptance, E, VU,NR by  at 12/31/2020 1430    Comment: Benefits of daily therapeutic ex/activities.    Acceptance, E, VU by  at 12/29/2020 1159                   Point: Home exercise program (Not Started)     Description:   Instruct learner(s) on appropriate technique for monitoring, assisting and/or progressing therapeutic exercises/activities.              Learner Progress:  Not documented in this visit.          Point: Precautions (Done)     Description:   Instruct learner(s) on prescribed precautions during self-care and functional transfers.              Learning Progress Summary           Patient Acceptance, E,TB,D,H, VU,NR by  at 1/4/2021 1528    Acceptance, E, VU by  at 12/29/2020 1159                   Point: Body mechanics (Done)     Description:   Instruct learner(s) on proper positioning and spine alignment during self-care, functional mobility activities and/or exercises.              Learning Progress Summary           Patient Acceptance, E,TB,D,H, VU,NR by  at 1/4/2021 1528    Acceptance, E, VU by  at 12/29/2020 1159                               User Key     Initials Effective Dates Name Provider Type Discipline     06/22/15 -  Zuri Spicer OT Occupational Therapist OT     03/07/18 -  Gris Montoya, OT Occupational Therapist OT      10/21/20 -  Karel Vu OT Occupational Therapist OT              OT Recommendation and Plan     Plan of Care Review  Plan of Care Reviewed With: patient  Progress: improving  Outcome Summary: Pt received in bed wiping teeth with napkin. Pt completes bed mobility with CGA and transfers with minAx1. Pt slightly impulsive and stands before cued. Pt sat supported in chair to complete hair care with modA due to excessive knotting. Pt washed face with setup. Pt adamantly decliend to complete oral care and stated teeth were clean. OT provided dependence for oral care and brushing dentures. At this time pt will require / assist. If family unable to provide recommend d/c to SNF. Pt is not safe to be alone at home.     Time Calculation:   Time Calculation- OT     Row Name 218             Time Calculation- OT    OT Start Time  1338  -HK      OT Received On  21  -         Timed Charges    38908 - OT Self Care/Mgmt Minutes  24  -HK        User Key  (r) = Recorded By, (t) = Taken By, (c) = Cosigned By    Initials Name Provider Type    HK Gris Montoya OT Occupational Therapist        Therapy Charges for Today     Code Description Service Date Service Provider Modifiers Qty    76421803889 HC OT SELF CARE/MGMT/TRAIN EA 15 MIN 2021 Gris Montoya OT GO 2               Gris Montoya OT  2021    Electronically signed by Gris Montoya OT at 21 1531            Discharge Summary      Lauren Stone PA-C at 21 0738                [facility] Medicine Services  DISCHARGE SUMMARY    Patient Name: Zohra Hall  : 1938  MRN: [id number]    Date of Admission: 2020 11:46 AM  Date of Discharge: 2021  Primary Care Physician: Aleah Regan MD    Hospital Course     Presenting Problem: Hypertensive encephalopathy [I67.4]    Active Hospital Problems    Diagnosis  POA   • **Hypertensive encephalopathy [I67.4]  Yes   • Uncontrolled hypertension  "[I10]  Yes   • Post-surgical hypothyroidism [E89.0]  Yes   • Hyponatremia [E87.1]  Yes   • Benign essential tremor [G25.0]  Yes   • Chronic diastolic heart failure (CMS/HCC) [I50.32]  Yes   • Dyslipidemia [E78.5]  Yes   • GERD without esophagitis [K21.9]  Yes   • Hypothyroid [E03.9]  Yes      Resolved Hospital Problems    Diagnosis Date Resolved POA   • Bradycardia [R00.1] 01/04/2021 No   • Hypotension due to drugs [I95.2] 01/04/2021 No      Hospital Course:  Zohra Hall is an 82 y.o. female with PMH significant for HTN, HLD, chronic diastolic CHF, aortic valve stenosis, polymyalgia rheumatica, post-surgical hypothyroidism, prior lacunar strokes, benign essential tremor, RLS and GERD. She was admitted to [facility] 10/30-11/5/20 for accelerated hypertension with associated mental status change and gait ataxia. She was evaluated by neurology. Her TSH was 18. She received 4 doses of Cytomel and her synthroid was increased from 112 mcg to 125 mcg. She discharged to rehab. It is not clear when she was discharged to home from rehab.       Patient had reportedly been confused for \"a while.\" She also developed headache, blurred vision, left-sided weakness and tremors of her jaw. She had run out of her medications and had not been taking her BP or thyroid medications. She tried to call her PCP but the office was closed. She talked to a nurse who recommended she go to urgent care. She presented to urgent care on 12/28/20. Her BP was 205/105. She was instructed to go to an ER and she presented to [facility] ED. She was admitted to the hospital medicine service. Later that evening, she appeared to have had a vagal response, followed by extreme hypotension and bradycardia. She was given a dose of atropine, which improved hemodynamics. She was transferred to the ICU for epinephrine gtt. She returned to telemetry floor on 12/30.       Hypertensive encephalopathy  Chronic diastolic CHF  - MRI brain " "without acute ischemia or hemorrhage  - Home meds (amlodipine, atenolol, enalapril, terazosin) restarted on admission. Patient then had an episode of vasovagal syncope with severe hypotension and bradycardia. Home meds held and have slowly been re-introduced  - Avoid beta blockers (atenolol) due to bradycardia / vagal episode  - Continue Amlodipine 10mg  - Stopped chlorthalidone on 1/3 due to hyponatremia and rising BUN/creatinine. Enalapril increased 40mg daily. Good BP control   - PT/OT following   - Attend scheduled neurology follow up on 1/25/21. Her POA (Joey arizmendi) is aware of this      Post-surgical hypothyroidism  History of papillary thyroid adenocarcinoma s/p thyroidectomy   - TSH 34 on admission, free T4 0.74   - Likely in the setting of medication non-compliance  - Continue Levothyroxine 125 mcg - will need repeat TSH / free T4 in 4-6 weeks     Hyponatremia  - Likely secondary to medications. Improved      Renal dysfunction / probable CKD   - Baseline creatinine appears to be 0.8-1.0   - Creatinine 0.97 today today, monitor      Urinary frequency  UTI   - 12/28 UA WNL   - 1/4 UA with 4+ bacteria. No IV access. StartedOmnicef 300mg BID  - Culture results still pending. Will notify facility if antibioticis inappropriate based on culture / susceptibility are no     Anxiety/depression, continue Cymbalta   GERD, Pepcid     Discharge Follow Up Recommendations for outpatient labs/diagnostics:  - PT/OT recommend rehab. [facility] accepted. She will transfer in stable condition on 1/5/20  - Please repeat BMP in 1 week to follow up on creatinine / sodium with recent medication changes  - Needs repeat TSH in 4-6 weeks  - Follow up with PCP 1 week after DC from rehab     Day of Discharge     HPI:   Sitting up in chair, getting started with breakfast. She says she slept well last night and is feeling \"pretty good\" this morning. No complaints aside from loose stools, she says she does not want more stool " softeners.     Review of Systems  Gen- No fevers, chills  CV- No chest pain, palpitations  Resp- No cough, dyspnea  GI- (+) loose stools, no abdominal pain, nausea or vomiting     Vital Signs:   Temp:  [97.4 °F (36.3 °C)-98.2 °F (36.8 °C)] 97.4 °F (36.3 °C)  Heart Rate:  [59-77] 60  Resp:  [16-18] 18  BP: (115-161)/(60-75) 127/66     Physical Exam:  Constitutional: No acute distress, awake, alert and conversant. Sitting upright in bed   HENT: NCAT, mucous membranes moist  Respiratory: Clear to auscultation bilaterally, respiratory effort normal on room air   Cardiovascular: Rate 70's, regular rhythm without m/r/g   Gastrointestinal: Positive bowel sounds, soft, nontender, nondistended  Musculoskeletal: No bilateral ankle edema  Psychiatric: Appropriate affect, cooperative  Neurologic: Did not ask orientation questions today. No focal deficits. Speech slowed but clear   Skin: No rashes to exposed skin     Pertinent  and/or Most Recent Results     Results from last 7 days   Lab Units 01/04/21  1918 01/04/21  0519 01/03/21  0626 01/02/21  0518 12/31/20  0949 12/30/20  0842 12/29/20  1326   WBC 10*3/mm3  --   --   --   --   --  5.67  --    HEMOGLOBIN g/dL  --   --   --   --   --  14.4  --    HEMATOCRIT %  --   --   --   --   --  44.5  --    PLATELETS 10*3/mm3  --   --   --   --   --  287  --    SODIUM mmol/L  --  131* 129* 133* 135* 134*  --    POTASSIUM mmol/L 4.3 3.4* 3.7 3.8 3.7 3.6 4.5   CHLORIDE mmol/L  --  94* 93* 96* 95* 95*  --    CO2 mmol/L  --  26.0 26.0 25.0 27.0 28.0  --    BUN mg/dL  --  23 24* 21 12 10  --    CREATININE mg/dL  --  0.97 1.05* 1.04* 0.76 0.72  --    GLUCOSE mg/dL  --  95 95 103* 132* 127*  --    CALCIUM mg/dL  --  8.9 9.1 9.4 9.5 9.1  --      Results from last 7 days   Lab Units 12/31/20  0949   BILIRUBIN mg/dL 0.4   ALK PHOS U/L 93   ALT (SGPT) U/L 11   AST (SGOT) U/L 16     Results for JAMIE GONZALEZ (MRN [id number]) as of 1/5/2021 08:16   Ref. Range 1/4/2021 08:11   Color, UA Latest  Ref Range: Yellow, Straw  Yellow   Appearance, UA Latest Ref Range: Clear  Cloudy (A)   Specific [place], UA Latest Ref Range: 1.001 - 1.030  1.020   pH, UA Latest Ref Range: 5.0 - 8.0  6.0   Glucose Latest Ref Range: Negative  Negative   Ketones, UA Latest Ref Range: Negative  Trace (A)   Blood, UA Latest Ref Range: Negative  Negative   Nitrite, UA Latest Ref Range: Negative  Negative   Leukocytes, UA Latest Ref Range: Negative  Small (1+) (A)   Protein, UA Latest Ref Range: Negative  Negative   Bilirubin, UA Latest Ref Range: Negative  Negative   Urobilinogen, UA Latest Ref Range: 0.2 - 1.0 E.U./dL  1.0 E.U./dL   RBC, UA Latest Ref Range: None Seen, 0-2 /HPF 7-12 (A)   WBC, UA Latest Ref Range: None Seen, 0-2 /HPF 13-20 (A)   Bacteria, UA Latest Ref Range: None Seen, Trace /HPF 4+ (A)   Squamous Epithelial Cells, UA Latest Ref Range: None Seen, 0-2 /HPF 7-12 (A)     Microbiology Results Abnormal     Procedure Component Value - Date/Time    COVID PRE-OP / PRE-PROCEDURE SCREENING ORDER (NO ISOLATION) - Swab, Nasopharynx [[id number]]  (Normal) Collected: 01/04/21 1559    Lab Status: Final result Specimen: Swab from Nasopharynx Updated: 01/04/21 1940    Narrative:      The following orders were created for panel order COVID PRE-OP / PRE-PROCEDURE SCREENING ORDER (NO ISOLATION) - Swab, Nasopharynx.  Procedure                               Abnormality         Status                     ---------                               -----------         ------                     COVID-19 and FLU A/B PCR...[[id number]]  Normal              Final result                 Please view results for these tests on the individual orders.    COVID-19 and FLU A/B PCR - Swab, Nasopharynx [[id number]]  (Normal) Collected: 01/04/21 1559    Lab Status: Final result Specimen: Swab from Nasopharynx Updated: 01/04/21 1940     COVID19 Not Detected     Influenza A PCR Not Detected     Influenza B PCR Not Detected    Narrative:      Fact sheet for  providers: https://www.fda.gov/media/604317/download    Fact sheet for patients: https://www.fda.gov/media/969017/download    Test performed by PCR.    COVID PRE-OP / PRE-PROCEDURE SCREENING ORDER (NO ISOLATION) - Swab, Nasopharynx [[id number]] Collected: 12/29/20 0019    Lab Status: Final result Specimen: Swab from Nasopharynx Updated: 12/29/20 1150    Narrative:      The following orders were created for panel order COVID PRE-OP / PRE-PROCEDURE SCREENING ORDER (NO ISOLATION) - Swab, Nasopharynx.  Procedure                               Abnormality         Status                     ---------                               -----------         ------                     COVID-19 PCR, [facility]...[[id number]]                      Final result                 Please view results for these tests on the individual orders.    COVID-19 PCR, [facility], NP SWAB IN [facility] TRANSPORT MEDIA 24-30 HR TAT - Swab, Nasopharynx [[id number]] Collected: 12/29/20 0019    Lab Status: Final result Specimen: Swab from Nasopharynx Updated: 12/29/20 1150     SARS-CoV-2 ROMULO Not Detected    Urine Culture - Urine, Urine, Clean Catch [[id number]] Collected: 12/28/20 1335    Lab Status: Final result Specimen: Urine, Clean Catch Updated: 12/29/20 0913     Urine Culture <10,000 CFU/mL Mixed Margarita Isolated    Narrative:      Specimen contains mixed organisms of questionable pathogenicity which indicates contamination with commensal margarita.  Further identification is unlikely to provide clinically useful information.  Suggest recollection.        Imaging Results (All)     Procedure Component Value Units Date/Time    XR Chest 1 View [[id number]] Collected: 12/28/20 2138     Updated: 12/28/20 2140    Narrative:      PROCEDURE: CR Chest 1 Vw    COMPARISON:  December 28 11:55 AM     INDICATIONS: Rapid response evaluation Hypertensive encephalopathy; Hypertensive crisis, unspecified     TECHNIQUE: Single AP  view of the chest    FINDINGS:   Cardiomediastinal silhouette is within normal limits given projection. History calcifications persist and in the aortic knob with mild tortuosity of the aorta. No acute infiltrate or lobar collapse seen. No pleural effusions noted. Osseous  structures are intact.      Impression:      No acute disease.    Signer Name: Macarena Vo MD   Signed: 12/28/2020 9:38 PM   Workstation Name: [facility]    [facility]      MRI Brain Without Contrast [[id number]] Collected: 12/28/20 1454     Updated: 12/28/20 1502    Narrative:      EXAMINATION: MRI BRAIN WO CONTRAST-      INDICATION: confusion, hypertensive crisis; I67.4-Hypertensive  encephalopathy; I16.9-Hypertensive crisis, unspecified     TECHNIQUE: Multiplanar multisequence MRI of the brain performed without  contrast     COMPARISON: 10/31/2020     FINDINGS: No acute infarct noted on diffusion weighted sequences.  Midline structures are unremarkable and the craniocervical junction is  satisfactory in appearance. Redemonstrated advanced T2 hyperintense  periventricular white matter changes likely related to chronic small  vessel ischemia. Unchanged degree of diffuse volume loss, without lobar  predilection. There is associated prominence of the ventricles and sulci  which are unchanged. There is otherwise no evidence of intracranial  hemorrhage, mass or mass effect. The orbits are normal and the paranasal  sinuses are grossly clear.       Impression:      Stable appearance of the brain with fairly advanced volume  loss and periventricular white matter sequela chronic small vessel  ischemia. There is otherwise no evidence of infarct, hemorrhage, mass or  mass effect.    This report was finalized on 12/28/2020 2:58 PM by Chuy Jaime.       XR Chest 1 View [[id number]] Collected: 12/28/20 1220     Updated: 12/28/20 1224    Narrative:      EXAMINATION: XR CHEST 1 VW-      INDICATION: Weak/Dizzy/AMS triage protocol      COMPARISON: 11/12/2013      FINDINGS: No focal airspace opacity. No pleural effusion or  pneumothorax. Normal heart and mediastinal contours.       Impression:      No evidence of acute disease in the chest.      This report was finalized on 12/28/2020 12:21 PM by Chuy Jaime.        Results for orders placed during the hospital encounter of 10/30/20   Adult Transthoracic Echo Complete W/ Cont if Necessary Per Protocol    Narrative · Calculated left ventricular EF = 55%  · Left ventricular systolic function is normal.  · Left ventricular diastolic function is consistent with (grade Ia w/high   LAP) impaired relaxation.  · The left atrial cavity is mild to moderately dilated.  · No significant structural or functional valvular disease.        Discharge Details        Discharge Medications      New Medications      Instructions Start Date   acetaminophen 325 MG tablet  Commonly known as: TYLENOL  Replaces: acetaminophen 650 MG 8 hr tablet   650 mg, Oral, Every 4 Hours PRN      aluminum-magnesium hydroxide-simethicone 400-400-40 MG/5ML suspension  Commonly known as: MAALOX MAX   15 mL, Oral, Every 6 Hours PRN      cefdinir 300 MG capsule  Commonly known as: OMNICEF   300 mg, Oral, Every 12 Hours Scheduled, X 7 doses      docusate sodium 100 MG capsule   200 mg, Oral, Daily PRN      ondansetron 4 MG tablet  Commonly known as: ZOFRAN   4 mg, Oral, Every 6 Hours PRN         Changes to Medications      Instructions Start Date   enalapril 20 MG tablet  Commonly known as: VASOTEC  What changed: how much to take   40 mg, Oral, Daily      primidone 250 MG tablet  Commonly known as: MYSOLINE  What changed: Another medication with the same name was changed. Make sure you understand how and when to take each.   125 mg, Oral, Nightly      primidone 50 MG tablet  Commonly known as: MYSOLINE  What changed: when to take this   50 mg, Oral, Every Morning         Continue These Medications      Instructions Start Date   alendronate 70 MG tablet  Commonly  known as: FOSAMAX   TAKE 1 TABLET BY MOUTH  EVERY 7 DAYS      amLODIPine 10 MG tablet  Commonly known as: NORVASC   10 mg, Oral, Daily      atorvastatin 40 MG tablet  Commonly known as: LIPITOR   40 mg, Oral, Daily      DULoxetine 30 MG capsule  Commonly known as: CYMBALTA   30 mg, Oral, Daily      famotidine 20 MG tablet  Commonly known as: PEPCID   20 mg, Oral, 2 Times Daily Before Meals      levothyroxine 125 MCG tablet  Commonly known as: SYNTHROID, LEVOTHROID   125 mcg, Oral, Daily         Stop These Medications    acetaminophen 650 MG 8 hr tablet  Commonly known as: TYLENOL  Replaced by: acetaminophen 325 MG tablet     atenolol 50 MG tablet  Commonly known as: TENORMIN     terazosin 1 MG capsule  Commonly known as: HYTRIN          Allergies   Allergen Reactions   • Dilaudid [Hydromorphone Hcl] Hallucinations     TABS   • Beta Adrenergic Blockers Other (See Comments)     Hypotension / bradycadia   • Lactose Intolerance (Gi) Diarrhea     Discharge Disposition:  Skilled Nursing Facility ([place] - External)    Diet:  Diet Order   Procedures   • Diet Regular; Low Sodium; 2,000 mg Na     Activity:  Activity Instructions     Activity as Tolerated      Up WIth Assist          CODE STATUS:    Code Status and Medical Interventions:   Ordered at: 12/28/20 1502     Level Of Support Discussed With:    Patient     Code Status:    No CPR     Medical Interventions (Level of Support Prior to Arrest):    Full     Future Appointments   Date Time Provider Department Center   1/11/2021  2:15 PM Aleah Regan MD [facility]   1/25/2021 10:15 AM Angy Gallagher MD [facility]   6/23/2021 10:45 AM Aleah Regan MD [facility]     Lauren Stone PA-C  01/05/21    Time Spent on Discharge:  I spent 35 minutes on this discharge activity which included: face-to-face encounter with the patient, reviewing the data in the system, coordination of the care with the nursing staff as well as consultants, documentation,  and entering orders.               Electronically signed by Lauren Stone PA-C at 01/05/21 0818

## 2025-04-03 ENCOUNTER — TELEMEDICINE (OUTPATIENT)
Dept: FAMILY MEDICINE CLINIC | Age: 50
End: 2025-04-03

## 2025-04-03 DIAGNOSIS — M62.830 BACK MUSCLE SPASM: ICD-10-CM

## 2025-04-03 DIAGNOSIS — Z13.220 SCREENING FOR LIPID DISORDERS: ICD-10-CM

## 2025-04-03 DIAGNOSIS — J45.40 MODERATE PERSISTENT ASTHMA WITHOUT COMPLICATION: ICD-10-CM

## 2025-04-03 DIAGNOSIS — G89.29 CHRONIC BILATERAL LOW BACK PAIN WITH BILATERAL SCIATICA: ICD-10-CM

## 2025-04-03 DIAGNOSIS — Z12.11 SCREENING FOR COLON CANCER: ICD-10-CM

## 2025-04-03 DIAGNOSIS — M15.0 PRIMARY OSTEOARTHRITIS INVOLVING MULTIPLE JOINTS: Chronic | ICD-10-CM

## 2025-04-03 DIAGNOSIS — E03.9 HYPOTHYROIDISM, UNSPECIFIED TYPE: Chronic | ICD-10-CM

## 2025-04-03 DIAGNOSIS — J30.89 PERENNIAL ALLERGIC RHINITIS: ICD-10-CM

## 2025-04-03 DIAGNOSIS — Z13.0 SCREENING FOR DEFICIENCY ANEMIA: ICD-10-CM

## 2025-04-03 DIAGNOSIS — M54.41 CHRONIC BILATERAL LOW BACK PAIN WITH BILATERAL SCIATICA: ICD-10-CM

## 2025-04-03 DIAGNOSIS — Z13.1 SCREENING FOR DIABETES MELLITUS (DM): ICD-10-CM

## 2025-04-03 DIAGNOSIS — Z12.31 ENCOUNTER FOR SCREENING MAMMOGRAM FOR MALIGNANT NEOPLASM OF BREAST: ICD-10-CM

## 2025-04-03 DIAGNOSIS — K21.9 GASTROESOPHAGEAL REFLUX DISEASE, UNSPECIFIED WHETHER ESOPHAGITIS PRESENT: ICD-10-CM

## 2025-04-03 DIAGNOSIS — M54.42 CHRONIC BILATERAL LOW BACK PAIN WITH BILATERAL SCIATICA: ICD-10-CM

## 2025-04-03 DIAGNOSIS — E66.01 MORBID OBESITY DUE TO EXCESS CALORIES: Primary | ICD-10-CM

## 2025-04-03 DIAGNOSIS — F25.1 SCHIZOAFFECTIVE DISORDER, DEPRESSIVE TYPE (HCC): ICD-10-CM

## 2025-04-03 DIAGNOSIS — J45.40 MODERATE PERSISTENT ASTHMA WITHOUT COMPLICATION: Chronic | ICD-10-CM

## 2025-04-03 PROBLEM — F41.9 ANXIETY AND DEPRESSION: Status: RESOLVED | Noted: 2019-08-02 | Resolved: 2025-04-03

## 2025-04-03 PROBLEM — F32.A ANXIETY AND DEPRESSION: Status: RESOLVED | Noted: 2019-08-02 | Resolved: 2025-04-03

## 2025-04-03 PROBLEM — F40.10 SOCIAL PHOBIA: Status: RESOLVED | Noted: 2024-05-01 | Resolved: 2025-04-03

## 2025-04-03 PROBLEM — L21.0 DANDRUFF IN ADULT: Status: RESOLVED | Noted: 2024-08-06 | Resolved: 2025-04-03

## 2025-04-03 RX ORDER — OMEPRAZOLE 40 MG/1
40 CAPSULE, DELAYED RELEASE ORAL
Qty: 90 CAPSULE | Refills: 1 | Status: SHIPPED | OUTPATIENT
Start: 2025-04-03

## 2025-04-03 RX ORDER — MELOXICAM 15 MG/1
15 TABLET ORAL DAILY
Qty: 90 TABLET | Refills: 1 | Status: SHIPPED | OUTPATIENT
Start: 2025-04-03

## 2025-04-03 RX ORDER — PALIPERIDONE 3 MG/1
3 TABLET, EXTENDED RELEASE ORAL DAILY
COMMUNITY
Start: 2025-03-17

## 2025-04-03 RX ORDER — LEVOTHYROXINE SODIUM 88 UG/1
88 TABLET ORAL DAILY
Qty: 90 TABLET | Refills: 1 | Status: SHIPPED | OUTPATIENT
Start: 2025-04-03

## 2025-04-03 RX ORDER — ESZOPICLONE 3 MG/1
TABLET, FILM COATED ORAL
COMMUNITY
Start: 2025-03-05

## 2025-04-03 RX ORDER — ALBUTEROL SULFATE 90 UG/1
2 INHALANT RESPIRATORY (INHALATION) EVERY 6 HOURS PRN
Qty: 8.5 EACH | Refills: 2 | Status: SHIPPED | OUTPATIENT
Start: 2025-04-03

## 2025-04-03 RX ORDER — CYCLOBENZAPRINE HCL 10 MG
10 TABLET ORAL 3 TIMES DAILY PRN
Qty: 90 TABLET | Refills: 1 | Status: SHIPPED | OUTPATIENT
Start: 2025-04-03

## 2025-04-03 RX ORDER — ALBUTEROL SULFATE 0.83 MG/ML
2.5 SOLUTION RESPIRATORY (INHALATION) 4 TIMES DAILY PRN
Qty: 120 EACH | Refills: 1 | Status: SHIPPED | OUTPATIENT
Start: 2025-04-03

## 2025-04-03 RX ORDER — IPRATROPIUM BROMIDE 42 UG/1
2 SPRAY, METERED NASAL 3 TIMES DAILY
Qty: 1 EACH | Refills: 0 | Status: SHIPPED | OUTPATIENT
Start: 2025-04-03

## 2025-04-03 RX ORDER — CETIRIZINE HYDROCHLORIDE 10 MG/1
10 TABLET ORAL DAILY
Qty: 90 TABLET | Refills: 1 | Status: SHIPPED | OUTPATIENT
Start: 2025-04-03

## 2025-04-03 RX ORDER — MONTELUKAST SODIUM 10 MG/1
10 TABLET ORAL NIGHTLY
Qty: 90 TABLET | Refills: 1 | Status: SHIPPED | OUTPATIENT
Start: 2025-04-03

## 2025-04-03 SDOH — ECONOMIC STABILITY: FOOD INSECURITY: WITHIN THE PAST 12 MONTHS, THE FOOD YOU BOUGHT JUST DIDN'T LAST AND YOU DIDN'T HAVE MONEY TO GET MORE.: PATIENT DECLINED

## 2025-04-03 SDOH — ECONOMIC STABILITY: FOOD INSECURITY: WITHIN THE PAST 12 MONTHS, YOU WORRIED THAT YOUR FOOD WOULD RUN OUT BEFORE YOU GOT MONEY TO BUY MORE.: PATIENT DECLINED

## 2025-04-03 SDOH — ECONOMIC STABILITY: TRANSPORTATION INSECURITY
IN THE PAST 12 MONTHS, HAS LACK OF TRANSPORTATION KEPT YOU FROM MEETINGS, WORK, OR FROM GETTING THINGS NEEDED FOR DAILY LIVING?: PATIENT DECLINED

## 2025-04-03 SDOH — ECONOMIC STABILITY: INCOME INSECURITY: IN THE LAST 12 MONTHS, WAS THERE A TIME WHEN YOU WERE NOT ABLE TO PAY THE MORTGAGE OR RENT ON TIME?: PATIENT DECLINED

## 2025-04-03 SDOH — ECONOMIC STABILITY: TRANSPORTATION INSECURITY
IN THE PAST 12 MONTHS, HAS THE LACK OF TRANSPORTATION KEPT YOU FROM MEDICAL APPOINTMENTS OR FROM GETTING MEDICATIONS?: PATIENT DECLINED

## 2025-04-03 ASSESSMENT — ENCOUNTER SYMPTOMS
DIARRHEA: 0
COLOR CHANGE: 0
NAUSEA: 0
CONSTIPATION: 0
ABDOMINAL PAIN: 0
EYE PAIN: 0
TROUBLE SWALLOWING: 0
VOICE CHANGE: 0
SHORTNESS OF BREATH: 0
CHEST TIGHTNESS: 0
BLOOD IN STOOL: 0
BACK PAIN: 0

## 2025-04-03 ASSESSMENT — PATIENT HEALTH QUESTIONNAIRE - PHQ9
5. POOR APPETITE OR OVEREATING: NOT AT ALL
4. FEELING TIRED OR HAVING LITTLE ENERGY: NOT AT ALL
SUM OF ALL RESPONSES TO PHQ QUESTIONS 1-9: 0
SUM OF ALL RESPONSES TO PHQ QUESTIONS 1-9: 0
6. FEELING BAD ABOUT YOURSELF - OR THAT YOU ARE A FAILURE OR HAVE LET YOURSELF OR YOUR FAMILY DOWN: NOT AT ALL
3. TROUBLE FALLING OR STAYING ASLEEP: NOT AT ALL
9. THOUGHTS THAT YOU WOULD BE BETTER OFF DEAD, OR OF HURTING YOURSELF: NOT AT ALL
8. MOVING OR SPEAKING SO SLOWLY THAT OTHER PEOPLE COULD HAVE NOTICED. OR THE OPPOSITE, BEING SO FIGETY OR RESTLESS THAT YOU HAVE BEEN MOVING AROUND A LOT MORE THAN USUAL: NOT AT ALL
SUM OF ALL RESPONSES TO PHQ QUESTIONS 1-9: 0
2. FEELING DOWN, DEPRESSED OR HOPELESS: NOT AT ALL
SUM OF ALL RESPONSES TO PHQ QUESTIONS 1-9: 0
10. IF YOU CHECKED OFF ANY PROBLEMS, HOW DIFFICULT HAVE THESE PROBLEMS MADE IT FOR YOU TO DO YOUR WORK, TAKE CARE OF THINGS AT HOME, OR GET ALONG WITH OTHER PEOPLE: NOT DIFFICULT AT ALL
1. LITTLE INTEREST OR PLEASURE IN DOING THINGS: NOT AT ALL
7. TROUBLE CONCENTRATING ON THINGS, SUCH AS READING THE NEWSPAPER OR WATCHING TELEVISION: NOT AT ALL

## 2025-04-03 NOTE — PROGRESS NOTES
[facility]  [address]  [address]  [place] [address]  Dept: [phone]  Dept Fax: [phone]  Loc: [phone]     4/3/2025    Visit type: Follow up    The patient (or guardian, if applicable) and other individuals in attendance with the patient were advised that Artificial Intelligence will be utilized during this visit to record, process the conversation to generate a clinical note, and support improvement of the AI technology. The patient (or guardian, if applicable) and other individuals in attendance at the appointment consented to the use of AI, including the recording.      Reason for Visit: Medication Refill       ASSESSMENT/PLAN     Assessment & Plan  1. Health maintenance  - Due for blood work, mammogram, and stool testing for colon cancer.  - Thyroid levels were within normal range during the last evaluation.  - Requisition for a mammogram and Cologuard order placed in the chart.  - Comprehensive blood work order placed, to be completed on Monday while fasting. Results will be communicated before noon.     2. Routine checkup  - No current concerns reported by the patient.  - Encouraged to contact the office via phone or message if any concerns arise.  - Follow-up appointment scheduled in 3 months.    3.  Hypothyroidism stable refill her medications follow-up in 3 months    4.  Bipolar disorder stable follow-up with psychiatrist    5.  Persistent asthma stable refill her medications    6.  Back spasm arthritis refill of medications they are all stable     1. Morbid obesity due to excess calories  2. Gastroesophageal reflux disease, unspecified whether esophagitis present  -     omeprazole (PRILOSEC) 40 MG delayed release capsule; Take 1 capsule by mouth every morning (before breakfast), Disp-90 capsule, R-1Normal  3. Moderate persistent asthma without complication  Comments:  Stable refill the medications  Orders:  -     montelukast

## 2025-05-03 PROBLEM — Z12.11 SCREENING FOR COLON CANCER: Status: RESOLVED | Noted: 2025-04-03 | Resolved: 2025-05-03

## 2025-05-03 PROBLEM — Z13.1 SCREENING FOR DIABETES MELLITUS (DM): Status: RESOLVED | Noted: 2025-04-03 | Resolved: 2025-05-03

## 2025-05-03 PROBLEM — Z12.31 ENCOUNTER FOR SCREENING MAMMOGRAM FOR MALIGNANT NEOPLASM OF BREAST: Status: RESOLVED | Noted: 2025-04-03 | Resolved: 2025-05-03

## 2025-05-03 PROBLEM — Z13.0 SCREENING FOR DEFICIENCY ANEMIA: Status: RESOLVED | Noted: 2025-04-03 | Resolved: 2025-05-03

## 2025-05-03 PROBLEM — Z13.220 SCREENING FOR LIPID DISORDERS: Status: RESOLVED | Noted: 2025-04-03 | Resolved: 2025-05-03

## 2025-05-05 DIAGNOSIS — J30.89 PERENNIAL ALLERGIC RHINITIS: ICD-10-CM

## 2025-05-05 RX ORDER — IPRATROPIUM BROMIDE 42 UG/1
2 SPRAY, METERED NASAL 3 TIMES DAILY
Qty: 3 EACH | Refills: 0 | Status: SHIPPED | OUTPATIENT
Start: 2025-05-05

## 2025-05-05 NOTE — TELEPHONE ENCOUNTER
Comments:     Last Office Visit (last PCP visit):   4/3/2025    Next Visit Date:  No future appointments.    **If hasn't been seen in over a year OR hasn't followed up according to last diabetes/ADHD visit, make appointment for patient before sending refill to provider.    Rx requested:  Requested Prescriptions     Pending Prescriptions Disp Refills    ipratropium (ATROVENT) 0.06 % nasal spray [Pharmacy Med Name: IPRATROPIUM 0.06% SPRAY]       Si SPRAYS BY NASAL ROUTE 3 TIMES DAILY

## 2025-05-12 ENCOUNTER — COMMUNITY OUTREACH (OUTPATIENT)
Dept: FAMILY MEDICINE CLINIC | Age: 50
End: 2025-05-12

## 2025-07-02 RX ORDER — FLUTICASONE PROPIONATE 110 UG/1
AEROSOL, METERED RESPIRATORY (INHALATION)
Qty: 12 EACH | Refills: 2 | Status: SHIPPED | OUTPATIENT
Start: 2025-07-02

## 2025-07-02 NOTE — TELEPHONE ENCOUNTER
Comments:     Last Office Visit (last PCP visit):   Visit date not found    Next Visit Date:  No future appointments.    **If hasn't been seen in over a year OR hasn't followed up according to last diabetes/ADHD visit, make appointment for patient before sending refill to provider.    Rx requested:  Requested Prescriptions     Pending Prescriptions Disp Refills    fluticasone (FLOVENT HFA) 110 MCG/ACT inhaler [Pharmacy Med Name: FLUTICASONE PROP  MCG] 12 each 2     Sig: INHALE 2 PUFFS INTO THE LUNGS IN THE MORNING AND AT BEDTIME

## 2025-07-13 DIAGNOSIS — M62.830 BACK MUSCLE SPASM: ICD-10-CM

## 2025-07-14 RX ORDER — CYCLOBENZAPRINE HCL 10 MG
TABLET ORAL
Qty: 90 TABLET | Refills: 1 | Status: SHIPPED | OUTPATIENT
Start: 2025-07-14

## 2025-07-14 NOTE — TELEPHONE ENCOUNTER
Comments:     Last Office Visit (last PCP visit):   4/3/2025    Next Visit Date:  No future appointments.    **If hasn't been seen in over a year OR hasn't followed up according to last diabetes/ADHD visit, make appointment for patient before sending refill to provider.    Rx requested:  Requested Prescriptions     Pending Prescriptions Disp Refills    cyclobenzaprine (FLEXERIL) 10 MG tablet [Pharmacy Med Name: CYCLOBENZAPRINE 10 MG TABLET] 90 tablet 1     Sig: TAKE 1 TABLET BY MOUTH THREE TIMES A DAY AS NEEDED FOR MUSCLE SPASM

## 2025-08-02 DIAGNOSIS — J30.89 PERENNIAL ALLERGIC RHINITIS: ICD-10-CM

## 2025-08-04 RX ORDER — IPRATROPIUM BROMIDE 42 UG/1
2 SPRAY, METERED NASAL 3 TIMES DAILY
Qty: 1 EACH | Refills: 3 | Status: SHIPPED | OUTPATIENT
Start: 2025-08-04